# Patient Record
Sex: FEMALE | Race: BLACK OR AFRICAN AMERICAN | Employment: FULL TIME | URBAN - METROPOLITAN AREA
[De-identification: names, ages, dates, MRNs, and addresses within clinical notes are randomized per-mention and may not be internally consistent; named-entity substitution may affect disease eponyms.]

---

## 2017-02-08 ENCOUNTER — OFFICE VISIT (OUTPATIENT)
Dept: SURGERY | Age: 57
End: 2017-02-08

## 2017-02-08 VITALS
DIASTOLIC BLOOD PRESSURE: 89 MMHG | BODY MASS INDEX: 25.46 KG/M2 | HEIGHT: 68 IN | WEIGHT: 168 LBS | SYSTOLIC BLOOD PRESSURE: 138 MMHG | HEART RATE: 67 BPM

## 2017-02-08 DIAGNOSIS — Z90.13 S/P BILATERAL MASTECTOMY: ICD-10-CM

## 2017-02-08 DIAGNOSIS — C50.211 BREAST CANCER OF UPPER-INNER QUADRANT OF RIGHT FEMALE BREAST (HCC): Primary | ICD-10-CM

## 2017-02-08 NOTE — PROGRESS NOTES
HISTORY OF PRESENT ILLNESS  Romy Reed is a 49 y.o. female. HPI   ESTABLISHED patient here for follow up RIGHT breast cancer. Denies any breast problems at this time. patient here for follow-up visit s/p BILATERAL mastectomies and PAC removal for RIGHT breast cancer T2(originally 3.5 cm now 1.8 cm post alejandrina) N0 triple negative. Surgery was on 2/23/16. Tissue expanders/implants placed by Dr. Juanpablo Knight  History of breast cancer-  RIGHT breast IDC T2N0 triple negative  Completed neoadjuvant chemotherapy. Followed by Dr. Jay Jay Almodovar. 2/23/16- BILATERAL mastectomy with reconstruction, RIGHT SLNB and port removal  .   No radiation. 5/2016- BILATERAL implants placed by Dr. Juanpablo Knight. 12/2016- BILATERAL revision breast reconstruction.        Review of Systems   All other systems reviewed and are negative. Physical Exam   Pulmonary/Chest:       Bilateral breasts surgically absent. Implants intact. No masses or adenopathy   Nursing note and vitals reviewed. ASSESSMENT and PLAN    ICD-10-CM ICD-9-CM    1. Breast cancer of upper-inner quadrant of right female breast ([facility] [address] C50.211 174.2    2. S/P bilateral mastectomy Z90.13 V45.71    patient here for follow-up visit s/p BILATERAL mastectomies and PAC removal for RIGHT breast cancer T2(originally 3.5 cm now 1.8 cm post alejandrina) N0 triple negative. Surgery was on 2/23/16. Tissue expanders/implants placed by Dr. Juanpablo Knight  - no evidence local recurrence  - f/u in 6 months.

## 2017-02-08 NOTE — PATIENT INSTRUCTIONS
Breast Self-Exam: Care Instructions  Your Care Instructions  A breast self-exam is when you check your breasts for lumps or changes. This regular exam helps you learn how your breasts normally look and feel. Most breast problems or changes are not because of cancer. Breast self-exam is not a substitute for a mammogram. Having regular breast exams by your doctor and regular mammograms improve your chances of finding any problems with your breasts. Some women set a time each month to do a step-by-step breast self-exam. Other women like a less formal system. They might look at their breasts as they brush their teeth, or feel their breasts once in a while in the shower. If you notice a change in your breast, tell your doctor. Follow-up care is a key part of your treatment and safety. Be sure to make and go to all appointments, and call your doctor if you are having problems. Its also a good idea to know your test results and keep a list of the medicines you take. How do you do a breast self-exam?  · The best time to examine your breasts is usually one week after your menstrual period begins. Your breasts should not be tender then. If you do not have periods, you might do your exam on a day of the month that is easy to remember. · To examine your breasts:  ¨ Remove all your clothes above the waist and lie down. When you are lying down, your breast tissue spreads evenly over your chest wall, which makes it easier to feel all your breast tissue. ¨ Use the pads--not the fingertips--of the 3 middle fingers of your left hand to check your right breast. Move your fingers slowly in small coin-sized circles that overlap. ¨ Use three levels of pressure to feel of all your breast tissue. Use light pressure to feel the tissue close to the skin surface. Use medium pressure to feel a little deeper. Use firm pressure to feel your tissue close to your breastbone and ribs.  Use each pressure level to feel your breast tissue before moving on to the next spot. ¨ Check your entire breast, moving up and down as if following a strip from the collarbone to the bra line, and from the armpit to the ribs. Repeat until you have covered the entire breast.  ¨ Repeat this procedure for your left breast, using the pads of the 3 middle fingers of your right hand. · To examine your breasts while in the shower:  ¨ Place one arm over your head and lightly soap your breast on that side. ¨ Using the pads of your fingers, gently move your hand over your breast (in the strip pattern described above), feeling carefully for any lumps or changes. ¨ Repeat for the other breast.  · Have your doctor inspect anything you notice to see if you need further testing. Where can you learn more? Go to http://blanka-nilson.info/. Enter P148 in the search box to learn more about \"Breast Self-Exam: Care Instructions. \"  Current as of: July 26, 2016  Content Version: 11.1  © 5339-3046 [employer]. Care instructions adapted under license by [employer] (which disclaims liability or warranty for this information). If you have questions about a medical condition or this instruction, always ask your healthcare professional. [place] [address] any warranty or liability for your use of this information.

## 2017-05-24 ENCOUNTER — OFFICE VISIT (OUTPATIENT)
Dept: ONCOLOGY | Age: 57
End: 2017-05-24

## 2017-05-24 VITALS — BODY MASS INDEX: 25.46 KG/M2 | WEIGHT: 168 LBS | HEIGHT: 68 IN | RESPIRATION RATE: 18 BRPM

## 2017-05-24 DIAGNOSIS — C50.911 MALIGNANT NEOPLASM OF RIGHT FEMALE BREAST, UNSPECIFIED SITE OF BREAST: Primary | ICD-10-CM

## 2017-05-24 NOTE — PROGRESS NOTES
Jluis Rojas is a 56 y.o. female had concerns including Follow-up and Breast Cancer. HIPAA verified by two patient identifiers. C/o moderate hot flashes,minimal itching  To abdomen,mild numbness /tingling to fingers and toes. Ms. Martha Platt has a reminder for a \"due or due soon\" health maintenance. I have asked that she contact her primary care provider for follow-up on this health maintenance.

## 2017-05-24 NOTE — PROGRESS NOTES
Follow up Note        Patient: Dayton Umana MRN: [id number]  SSN: [id number]    YOB: 1960  Age: 56 y.o. Sex: female        Diagnosis:     1. Right breast carcinoma:  T2 N0 Mx (Stage IIA) infiltrating ductal carcinoma, Tumor size 3.5 cm, LN -ve, grade 3, ER -ve, TX -ve, Her 2 -ve      Treatment:     1. Neoadjuvant chemotherapy   S/p Taxol, Carboplatin - completed 9 weeks on     Therapy stopped due to significant cytopenias (11/25/16)   Adriamycin, Cytoxan -  Cycle 4 completed 1/27/16    2. Bilateral mastectomy with reconstruction by tissue expanders 2/23/16            Subjective:      Dayton Umana is a 56 y.o. female with right sided breast cancer. She felt a lump in her breast, underwent a mammogram, was noted to have a 3.5 cm mass in the medial upper part of the breast. A biopsy of the mass confirmed a nuclear grade III, ER -ve, TX -ve, Her 2 -ve. [place] LN biopsy shows no evidence of disease in the LNs. She completed neoadjuvant chemotherapy and then underwent bilateral mastectomies. She had reconstructive surgery by Dr. Samia Deleon 12/19/2017. Ms. Gomez Fitzpatrick returns today feeling well except for intermittent vertigo.  She said she has a history of this and saw an ENT in the past. She also has intermittent neuropathy in the fingers and hot flashes      Review of Systems:    Constitutional: hot flashes  Eyes: negative  Ears, Nose, Mouth, Throat, and Face: negative  Respiratory: negative  Cardiovascular: negative  Gastrointestinal: nausea  Hematologic/Lymphatic: negative  Skin: s/p mastectomies and reconstruction  Musculoskeletal:negative  Neurological: intermittent neuropathy of the fingers      Past Medical History:   Diagnosis Date    ARF (acute renal failure) ([facility] [address] 3/12/11    as of 8/11/15 pt states has resolved and is not currently being folllowed by specialist    Autoimmune disease ([facility] [address] 2016    SJOURN SYNDROM NEW DX & RA    Cancer ([facility] [address] 7/2015    triple (-) breast ca    Chest pain Per Pt on Rt side and arm pit only, resolves with Tylenol and states she believes it is related to her tumor    Colon polyp     Dizziness     pt states it is vertigo    Echocardiogram normal 2/19/16    Family hx of colon cancer     History of chemotherapy     AUG 2015- JAN 2016    Ill-defined condition     HAD CHEMO LAST JAN 2016    Ill-defined condition     rhematoid arthritis,sjogren syndrome    Nephrotic syndrome 3/12/2011    biopsy inconclusive but steroid responsive    TMJ (temporomandibular joint disorder)     Toothache     being treated for tooth ache x 1 week.  pain better with treatment     Past Surgical History:   Procedure Laterality Date    BREAST SURGERY PROCEDURE UNLISTED Right 8/13/15    RIGHT axillary SLNB and port a cath insertion    ENDOSCOPY, COLON, DIAGNOSTIC  12/12/11    Dr. Sharan Dickerson HX BREAST BIOPSY Right 7/2015    HX BREAST RECONSTRUCTION Bilateral 2/23/2016    BREAST RECONSTRUCTION performed by Mehran Pandya MD at [address] HX BREAST RECONSTRUCTION Bilateral 5/23/2016    REMOVAL TISSUE EXPANDERS BILATERAL BREAST / RECONSTRUCTION BILATERAL BREASTS / GEL IMPLANTS  performed by Mehran Pandya MD at [address] [place] [address] HX BREAST RECONSTRUCTION Bilateral 12/19/2016    REVISION BILATERAL BREAST RECONSTRUCTION WITH EXCISION OF EXCESS TISSUE / LATERAL BREAST BILATERAL NIPPLE RECONSTRUCTION WITH FTSG  performed by Mehran Pandya MD at [facility] HX GI      COLONOSCOPY    HX GI      ENDOSCOPY    HX GYN      uterine ablation    HX MASTECTOMY Bilateral 2/23/2016    BILATERAL BREAST MASTECTOMY, BILATERAL BREAST RECONSTRUCTION WITH TISSUE EXPANDERS AFTER MASTECTOMIES REMOVE PORT LEFT CHEST performed by Jonas Ray MD at [place] [address]    HX TONSILLECTOMY      HX TUBAL LIGATION      HX VASCULAR ACCESS      PORT-A-CATH    HX VASCULAR ACCESS      PORT-A-CATH REMOVAL      Family History   Problem Relation Age of Onset    Cancer Mother colon cancer    Hypertension Mother     Kidney Disease Mother      dialysis    Alcohol abuse Father     Stroke Sister     Cancer Brother      bone cancer    Anemia Daughter     Diabetes Brother     Heart Surgery Sister      CABG 3 WAY    Heart Attack Sister     No Known Problems Brother     Anesth Problems Son     Delayed Awakening Son      Social History   Substance Use Topics    Smoking status: Never Smoker    Smokeless tobacco: Never Used    Alcohol use No      Prior to Admission medications    Medication Sig Start Date End Date Taking? Authorizing Provider   omega-3 fatty acids-vitamin e (FISH OIL) 1,000 mg cap Take 1 Cap by mouth daily. Historical Provider   Ferrous Fumarate 325 mg (106 mg iron) tab Take  by mouth. Historical Provider   MULTIVITAMINS (MULTIVITAMIN PO) Take 1 Tab by mouth daily. 3/30/10   Historical Provider          Allergies   Allergen Reactions    Lisinopril Rash and Angioedema    Pcn [Penicillins] Other (comments)     Hallucinations, confusion    Septra [Sulfamethoprim Ds] Rash         Objective:     Vitals:    05/24/17 1458   Resp: 18   Weight: 168 lb (76.2 kg)   Height: 5' 8\" (1.727 m)          Physical Exam:    GENERAL: alert, cooperative  EYE: negative  LYMPHATIC: Cervical, supraclavicular, and axillary nodes normal.   THROAT & NECK: normal and no erythema or exudates noted. LUNG: clear to auscultation bilaterally  HEART: regular rate and rhythm  ABDOMEN: soft, non-tender  EXTREMITIES: no cyanosis or edema  SKIN: healing bilateral mastectomy incisions with tissue expanders  NEUROLOGIC: negative          Assessment:     1.  Right breast carcinoma:  T2 N0 Mx (Stage IIA) infiltrating ductal carcinoma, Tumor size 3.5 cm, LN -ve, grade 3, ER -ve, IN -ve, Her 2 -ve    ypT1c N0    ECOG PS 0  Intent of therapy - curative    LVEF 60-65% - repeated on 2/19/16 after completion of chemotherapy    Adriamycin / cytoxan - s/p 4 cycles  Taxol/Carboplatin, completed 9 weeks    Therapy stopped because of recurrent cytopenia     S/p bilateral mastectomy - (2/23/16) 1.8 cm of residual tumor   Asymptomatic  In remission  Met with Survivorship NP        Plan:       1. Follow up in 6 months        Signed by: Luz Collado MD                     June 13, 2017        CC.  MD Kathy Lovelace MD

## 2017-05-26 ENCOUNTER — TELEPHONE (OUTPATIENT)
Dept: INTERNAL MEDICINE CLINIC | Age: 57
End: 2017-05-26

## 2017-06-05 ENCOUNTER — OFFICE VISIT (OUTPATIENT)
Dept: INTERNAL MEDICINE CLINIC | Age: 57
End: 2017-06-05

## 2017-06-05 VITALS
WEIGHT: 171 LBS | OXYGEN SATURATION: 98 % | HEART RATE: 62 BPM | BODY MASS INDEX: 25.91 KG/M2 | TEMPERATURE: 98.1 F | HEIGHT: 68 IN | DIASTOLIC BLOOD PRESSURE: 85 MMHG | SYSTOLIC BLOOD PRESSURE: 136 MMHG | RESPIRATION RATE: 18 BRPM

## 2017-06-05 DIAGNOSIS — Z23 ENCOUNTER FOR IMMUNIZATION: ICD-10-CM

## 2017-06-05 DIAGNOSIS — H81.90 VESTIBULAR DIZZINESS, UNSPECIFIED LATERALITY: ICD-10-CM

## 2017-06-05 DIAGNOSIS — Z13.220 SCREENING FOR HYPERLIPIDEMIA: ICD-10-CM

## 2017-06-05 DIAGNOSIS — R53.83 FATIGUE, UNSPECIFIED TYPE: Primary | ICD-10-CM

## 2017-06-05 RX ORDER — MECLIZINE HYDROCHLORIDE 25 MG/1
TABLET ORAL
Qty: 30 TAB | Refills: 0 | Status: SHIPPED | OUTPATIENT
Start: 2017-06-05 | End: 2017-08-07

## 2017-06-05 NOTE — PROGRESS NOTES
Maye Rosenthal is a 56 y.o. female who presents with concerns of a rash on right forearm for the past 5 days. It is better today. No new medications or supplements. No new topicals. No fever. Reports dizziness that is constant. She has a history of vertigo. At times, feels like may pass out. She reports malaise, fatigued and nauseated. Eating more frequent small meals. Increased protein in diet. Followed by Dr. Karsten Ace and Dr. Chelo Griffith for prior BRCA. No sign of recurrence. Saw rheumatologist, Dr. Alesha Dumont. Was told that vitamin D was high, stopped all supplements and seen for followup in march, vitamin D and calcium were normal.        Past Medical History:   Diagnosis Date    ARF (acute renal failure) ([facility] [address] 3/12/11    as of 8/11/15 pt states has resolved and is not currently being folllowed by specialist    Autoimmune disease ([facility] [address] 2016    SJOURN SYNDROM NEW DX & RA    Cancer ([facility] [address] 7/2015    triple (-) breast ca    Chest pain     Per Pt on Rt side and arm pit only, resolves with Tylenol and states she believes it is related to her tumor    Colon polyp     Dizziness     pt states it is vertigo    Echocardiogram normal 2/19/16    Family hx of colon cancer     History of chemotherapy     AUG 2015- JAN 2016    Ill-defined condition     HAD CHEMO LAST JAN 2016    Ill-defined condition     rhematoid arthritis,sjogren syndrome    Nephrotic syndrome 3/12/2011    biopsy inconclusive but steroid responsive    TMJ (temporomandibular joint disorder)     Toothache     being treated for tooth ache x 1 week.  pain better with treatment       Family History   Problem Relation Age of Onset    Cancer Mother      colon cancer    Hypertension Mother     Kidney Disease Mother      dialysis    Alcohol abuse Father     Stroke Sister     Cancer Brother      bone cancer    Anemia Daughter     Diabetes Brother     Heart Surgery Sister      CABG 3 WAY    Heart Attack Sister     No Known Problems Brother     Anesth Problems Son     Delayed Awakening Son        Social History     Social History    Marital status:      Spouse name: N/A    Number of children: N/A    Years of education: N/A     Occupational History    Not on file. Social History Main Topics    Smoking status: Never Smoker    Smokeless tobacco: Never Used    Alcohol use No    Drug use: No    Sexual activity: Yes     Partners: Male     Birth control/ protection: None     Other Topics Concern    Not on file     Social History Narrative       No current outpatient prescriptions on file prior to visit. No current facility-administered medications on file prior to visit. Review of Systems  Pertinent items are noted in HPI. Objective:     Visit Vitals    /85 (BP 1 Location: Left arm, BP Patient Position: Sitting)    Pulse 62    Temp 98.1 °F (36.7 °C) (Oral)    Resp 18    Ht 5' 8\" (1.727 m)    Wt 171 lb (77.6 kg)    LMP 04/01/2015    SpO2 98%    BMI 26 kg/m2     Gen: well appearing female  Resp:  No wheezing, no rhonchi, no rales. CV:  RRR, normal S1S2  Skin: punctate lesion on right forearm. Neuro- alert, normal gait, 5/5 motor in lower extremities, normal sensory, normal coordination. Assessment/Plan:     1. Fatigue, unspecified type- given information on fatigue. Checking baseline labs. Recommend regular exercise and adequate rest    - THYROID PANEL W/TSH; Future  - CBC W/O DIFF; Future    2. Screening for hyperlipidemia    - LIPID PANEL; Future    3. Vestibular dizziness, unspecified laterality- given vertigo exercises. Antihistamines prn. Follow-up Disposition:  Return for followup pending labs.   Pilar Michel MD

## 2017-06-05 NOTE — MR AVS SNAPSHOT
Visit Information Date & Time Provider Department Dept. Phone Encounter #  
 6/5/2017 11:45 AM Viviana Dasilva, [address]  [id number] Follow-up Instructions Return for followup pending labs. .  
  
Your Appointments 8/7/2017  9:00 AM  
Follow Up with BINTA Salas [address] ([address]) Appt Note: 6 month follow up / cp $40 ct 2-8-17  
 [address] [facility] [address]  
  
    
 11/27/2017  8:30 AM  
Any with Henry Cleveland MD  
[address] [facility]) Appt Note: onc 6 mth f/u  
 [address] [address]  
[address] [facility] [address] Upcoming Health Maintenance Date Due Pneumococcal 19-64 Highest Risk (1 of 3 - PCV13) 3/3/1981 DTaP/Tdap/Td series (1 - Tdap) 3/3/1983 PAP AKA CERVICAL CYTOLOGY 9/1/2014 BREAST CANCER SCRN MAMMOGRAM 7/17/2017 INFLUENZA AGE 9 TO ADULT 8/1/2017 COLONOSCOPY 11/7/2021 Allergies as of 6/5/2017  Review Complete On: 6/5/2017 By: Zbigniew Chu LPN Severity Noted Reaction Type Reactions Lisinopril  06/11/2012   Side Effect Rash, Angioedema Pcn [Penicillins]  03/30/2010    Other (comments) Hallucinations, confusion Septra [Sulfamethoprim Ds]  06/29/2014    Rash Current Immunizations  Reviewed on 5/24/2017 Name Date Influenza Vaccine (Quad) PF 11/3/2016 Influenza Vaccine PF 11/21/2014 Not reviewed this visit You Were Diagnosed With   
  
 Codes Comments Fatigue, unspecified type    -  Primary ICD-10-CM: R53.83 ICD-9-CM: 780.79 Screening for hyperlipidemia     ICD-10-CM: Z13.220 ICD-9-CM: V77.91   
 Vestibular dizziness, unspecified laterality     ICD-10-CM: H81.90 ICD-9-CM: 386. 9 Vitals BP Pulse Temp Resp Height(growth percentile) Weight(growth percentile) 136/85 (BP 1 Location: Left arm, BP Patient Position: Sitting) 62 98.1 °F (36.7 °C) (Oral) 18 5' 8\" (1.727 m) 171 lb (77.6 kg) LMP SpO2 BMI OB Status Smoking Status 04/01/2015 98% 26 kg/m2 Postmenopausal Never Smoker Vitals History BMI and BSA Data Body Mass Index Body Surface Area  
 26 kg/m 2 1.93 m 2 Preferred Pharmacy [facility] Phone [facility] [address] [place] [address] [phone] Your Updated Medication List  
  
   
This list is accurate as of: 6/5/17 12:40 PM.  Always use your most recent med list.  
  
  
  
  
 meclizine 25 mg tablet Commonly known as:  ANTIVERT Take 1/2-1 tablet three times a day as needed for vertigo. Prescriptions Sent to Pharmacy Refills  
 meclizine (ANTIVERT) 25 mg tablet 0 Sig: Take 1/2-1 tablet three times a day as needed for vertigo. Class: Normal  
 Pharmacy: [id number] [address] [place] [address] #: [phone] Follow-up Instructions Return for followup pending labs. Dajuan Pozo To-Do List   
 06/05/2017 Lab:  CBC W/O DIFF   
  
 06/05/2017 Lab:  LIPID PANEL   
  
 06/05/2017 Lab:  THYROID PANEL W/TSH Patient Instructions [place] Exercises for Vertigo: Care Instructions Your Care Instructions Simple exercises can help you regain your balance when you have vertigo. If you have Ménière's disease, benign paroxysmal positional vertigo (BPPV), or another inner ear problem, you may have vertigo off and on. Do these exercises first thing in the morning and before you go to bed. You might get dizzy when you first start them.  If this happens, try to do them for at least 5 minutes. Do a group of exercises at a time, starting at the top of the list. It may take several weeks before you can do all the exercises without feeling dizzy. Follow-up care is a key part of your treatment and safety. Be sure to make and go to all appointments, and call your doctor if you are having problems. It's also a good idea to know your test results and keep a list of the medicines you take. How can you care for yourself at home? Exercise 1 While sitting on the side of the bed and holding your head still: 
· Look up as far as you can. · Look down as far as you can. · Look from side to side as far as you can. · Stretch your arm straight out in front of you. Focus on your index finger. Continue to focus on your finger while you bring it to your nose. Exercise 2 While sitting on the side of the bed: · Bring your head as far back as you can. · Bring your head forward to touch your chin to your chest. 
· Turn your head from side to side. · Do these exercises first with your eyes open. Then try with your eyes closed. Exercise 3 While sitting on the side of the bed: · Shrug your shoulders straight upward, then relax them. · Bend over and try to touch the ground with your fingers. Then go back to a sitting position. · Toss a small ball from one hand to the other. Throw the ball higher than your eyes so you have to look up. Exercise 4 While standing (with someone close by if you feel uncomfortable): 
· Repeat Exercise 1. 
· Repeat Exercise 2. 
· Pass a ball between your legs and above your head. · Sit down and then stand up. Repeat. Turn around in a [place] a different way each time you stand. · With someone close by to help you, try the above exercises with your eyes closed. Exercise 5 In a room that is cleared of obstacles: 
· Walk to a corner of the room, turn to your right, and walk back to the starting point. Now, repeat and turn left. · Walk up and down a slope. Now try stairs. · While holding on to someone's arm, try these exercises with your eyes closed. When should you call for help? Watch closely for changes in your health, and be sure to contact your doctor if: 
· Your vertigo gets worse. · You want more information about vertigo. · You want more information about exercises for vertigo. Where can you learn more? Go to http://blanka-nilson.info/. Enter K625 in the search box to learn more about \"[place] Exercises for Vertigo: Care Instructions. \" Current as of: July 29, 2016 Content Version: 11.2 © 8678-1634 [employer]. Care instructions adapted under license by [employer] (which disclaims liability or warranty for this information). If you have questions about a medical condition or this instruction, always ask your healthcare professional. [address] any warranty or liability for your use of this information. Introducing [facility]! Dear Chris Mensah: Thank you for requesting a MPSTOR account. Our records indicate that you already have an active MPSTOR account. You can access your account anytime at [email or web address] Did you know that you can access your hospital and ER discharge instructions at any time in MPSTOR? You can also review all of your test results from your hospital stay or ER visit. Additional Information If you have questions, please visit the Frequently Asked Questions section of the MPSTOR website at [email or web address] Remember, MPSTOR is NOT to be used for urgent needs. For medical emergencies, dial 911. Now available from your iPhone and Android! Please provide this summary of care documentation to your next provider. Your primary care clinician is listed as Reyes Bracken. If you have any questions after today's visit, please call [phone].

## 2017-06-05 NOTE — PATIENT INSTRUCTIONS
[place]orne Exercises for Vertigo: Care Instructions  Your Care Instructions  Simple exercises can help you regain your balance when you have vertigo. If you have Ménière's disease, benign paroxysmal positional vertigo (BPPV), or another inner ear problem, you may have vertigo off and on. Do these exercises first thing in the morning and before you go to bed. You might get dizzy when you first start them. If this happens, try to do them for at least 5 minutes. Do a group of exercises at a time, starting at the top of the list. It may take several weeks before you can do all the exercises without feeling dizzy. Follow-up care is a key part of your treatment and safety. Be sure to make and go to all appointments, and call your doctor if you are having problems. It's also a good idea to know your test results and keep a list of the medicines you take. How can you care for yourself at home? Exercise 1  While sitting on the side of the bed and holding your head still:  · Look up as far as you can. · Look down as far as you can. · Look from side to side as far as you can. · Stretch your arm straight out in front of you. Focus on your index finger. Continue to focus on your finger while you bring it to your nose. Exercise 2  While sitting on the side of the bed:  · Bring your head as far back as you can. · Bring your head forward to touch your chin to your chest.  · Turn your head from side to side. · Do these exercises first with your eyes open. Then try with your eyes closed. Exercise 3  While sitting on the side of the bed:  · Shrug your shoulders straight upward, then relax them. · Bend over and try to touch the ground with your fingers. Then go back to a sitting position. · Toss a small ball from one hand to the other. Throw the ball higher than your eyes so you have to look up.   Exercise 4  While standing (with someone close by if you feel uncomfortable):  · Repeat Exercise 1.  · Repeat Exercise 2.  · Pass a ball between your legs and above your head. · Sit down and then stand up. Repeat. Turn around in a [place] a different way each time you stand. · With someone close by to help you, try the above exercises with your eyes closed. Exercise 5  In a room that is cleared of obstacles:  · Walk to a corner of the room, turn to your right, and walk back to the starting point. Now, repeat and turn left. · Walk up and down a slope. Now try stairs. · While holding on to someone's arm, try these exercises with your eyes closed. When should you call for help? Watch closely for changes in your health, and be sure to contact your doctor if:  · Your vertigo gets worse. · You want more information about vertigo. · You want more information about exercises for vertigo. Where can you learn more? Go to http://blanka-nilson.info/. Enter B227 in the search box to learn more about \"Cawthorne Exercises for Vertigo: Care Instructions. \"  Current as of: July 29, 2016  Content Version: 11.2  © 6502-9726 Healthwise, Incorporated. Care instructions adapted under license by [employer] (which disclaims liability or warranty for this information). If you have questions about a medical condition or this instruction, always ask your healthcare professional. [place] [address] any warranty or liability for your use of this information.

## 2017-06-08 ENCOUNTER — LAB ONLY (OUTPATIENT)
Dept: INTERNAL MEDICINE CLINIC | Age: 57
End: 2017-06-08

## 2017-06-08 DIAGNOSIS — Z13.220 SCREENING FOR HYPERLIPIDEMIA: ICD-10-CM

## 2017-06-08 DIAGNOSIS — R53.83 FATIGUE, UNSPECIFIED TYPE: ICD-10-CM

## 2017-06-08 NOTE — LETTER
6/23/2017 3:33 PM 
 
Ms. Fahad Adkins [address] [facility] [address] [address] Dear Fahad Adkins: 
 
Please find your most recent results below. Resulted Orders THYROID PANEL W/TSH Result Value Ref Range TSH 1.680 0.450 - 4.500 uIU/mL T4, Total 7.8 4.5 - 12.0 ug/dL  
 T3 Uptake 27 24 - 39 % Free Thyroxine Index 2.1 1.2 - 4.9 Narrative Performed at:  [address]  [id number] : Paul Parker MD, Phone:  [phone] CBC W/O DIFF Result Value Ref Range WBC 3.8 3.4 - 10.8 x10E3/uL  
 RBC 4.16 3.77 - 5.28 x10E6/uL HGB 12.9 11.1 - 15.9 g/dL HCT 38.5 34.0 - 46.6 % MCV 93 79 - 97 fL  
 MCH 31.0 26.6 - 33.0 pg  
 MCHC 33.5 31.5 - 35.7 g/dL  
 RDW 14.7 12.3 - 15.4 % PLATELET 090 667 - 607 x10E3/uL Narrative Performed at:  [address]  [id number] : Paul Parker MD, Phone:  [phone] LIPID PANEL Result Value Ref Range Cholesterol, total 193 100 - 199 mg/dL Triglyceride 87 0 - 149 mg/dL HDL Cholesterol 58 >39 mg/dL VLDL, calculated 17 5 - 40 mg/dL LDL, calculated 118 (H) 0 - 99 mg/dL Narrative Performed at:  [address]  [id number] : Paul Parker MD, Phone:  [phone] RECOMMENDATIONS: 
Labs are normal including thyroid and blood counts.  Mildly elevated LDL cholesterol. Nothing in lab to explain fatigue. Please call me if you have any questions: [phone] Sincerely, [facility]

## 2017-06-09 LAB
CHOLEST SERPL-MCNC: 193 MG/DL (ref 100–199)
ERYTHROCYTE [DISTWIDTH] IN BLOOD BY AUTOMATED COUNT: 14.7 % (ref 12.3–15.4)
FT4I SERPL CALC-MCNC: 2.1 (ref 1.2–4.9)
HCT VFR BLD AUTO: 38.5 % (ref 34–46.6)
HDLC SERPL-MCNC: 58 MG/DL
HGB BLD-MCNC: 12.9 G/DL (ref 11.1–15.9)
LDLC SERPL CALC-MCNC: 118 MG/DL (ref 0–99)
MCH RBC QN AUTO: 31 PG (ref 26.6–33)
MCHC RBC AUTO-ENTMCNC: 33.5 G/DL (ref 31.5–35.7)
MCV RBC AUTO: 93 FL (ref 79–97)
PLATELET # BLD AUTO: 211 X10E3/UL (ref 150–379)
RBC # BLD AUTO: 4.16 X10E6/UL (ref 3.77–5.28)
T3RU NFR SERPL: 27 % (ref 24–39)
T4 SERPL-MCNC: 7.8 UG/DL (ref 4.5–12)
TRIGL SERPL-MCNC: 87 MG/DL (ref 0–149)
TSH SERPL DL<=0.005 MIU/L-ACNC: 1.68 UIU/ML (ref 0.45–4.5)
VLDLC SERPL CALC-MCNC: 17 MG/DL (ref 5–40)
WBC # BLD AUTO: 3.8 X10E3/UL (ref 3.4–10.8)

## 2017-06-21 NOTE — PROGRESS NOTES
Notify patient labs normal including thyroid and blood counts. Mildly elevated LDL cholesterol. Nothing in lab to explain fatigue.

## 2017-06-30 ENCOUNTER — TELEPHONE (OUTPATIENT)
Dept: INTERNAL MEDICINE CLINIC | Age: 57
End: 2017-06-30

## 2017-08-07 ENCOUNTER — OFFICE VISIT (OUTPATIENT)
Dept: SURGERY | Age: 57
End: 2017-08-07

## 2017-08-07 VITALS
DIASTOLIC BLOOD PRESSURE: 91 MMHG | BODY MASS INDEX: 25.91 KG/M2 | SYSTOLIC BLOOD PRESSURE: 135 MMHG | HEART RATE: 67 BPM | HEIGHT: 68 IN | WEIGHT: 171 LBS

## 2017-08-07 DIAGNOSIS — C50.911 MALIGNANT NEOPLASM OF RIGHT FEMALE BREAST, UNSPECIFIED SITE OF BREAST: Primary | ICD-10-CM

## 2017-08-07 DIAGNOSIS — Z90.13 S/P BILATERAL MASTECTOMY: ICD-10-CM

## 2017-08-07 NOTE — PROGRESS NOTES
HISTORY OF PRESENT ILLNESS  Adrienne Jackson is a 56 y.o. female. HPI  ESTABLISHED patient here today for follow up RIGHT breast cancer. Has occasional bilateral breast discomfort. Denies any other breast problems at this time. History of breast cancer-  RIGHT breast IDC T2N0 triple negative  Completed neoadjuvant chemotherapy. Followed by Dr. Leonor Parr (originally 3.5 cm, 1.8 cm post neoadjuvant chemo)  2/23/16- BILATERAL mastectomy with reconstruction, RIGHT SLNB and port removal  .   No radiation. 5/2016- BILATERAL implants placed by Dr. Bradley Arzola. 12/2016- BILATERAL revision breast reconstruction. OB History     Obstetric Comments    Menarche:  8. LMP: 4/8/15. # of Children:  3. Age at Delivery of First Child:  14.   Hysterectomy/oophorectomy:  NO/NO. Breast Bx:  yes. Hx of Breast Feeding:  no. BCP:  yes.  Hormone therapy:  no.               Past Surgical History:   Procedure Laterality Date    BREAST SURGERY PROCEDURE UNLISTED Right 8/13/15    RIGHT axillary SLNB and port a cath insertion    ENDOSCOPY, COLON, DIAGNOSTIC  12/12/11    Dr. Yusef Ordonez HX BREAST BIOPSY Right 7/2015    HX BREAST RECONSTRUCTION Bilateral 2/23/2016    BREAST RECONSTRUCTION performed by Fany Stephen MD at [address] [place] [address] Bilateral 5/23/2016    REMOVAL TISSUE EXPANDERS BILATERAL BREAST / RECONSTRUCTION BILATERAL BREASTS / GEL IMPLANTS  performed by Fany Stephen MD at [address] HX BREAST RECONSTRUCTION Bilateral 12/19/2016    REVISION BILATERAL BREAST RECONSTRUCTION WITH EXCISION OF EXCESS TISSUE / LATERAL BREAST BILATERAL NIPPLE RECONSTRUCTION WITH FTSG  performed by Fany Stephen MD at [address] HX GI      COLONOSCOPY    HX GI      ENDOSCOPY    HX GYN      uterine ablation    HX MASTECTOMY Bilateral 2/23/2016    BILATERAL BREAST MASTECTOMY, BILATERAL BREAST RECONSTRUCTION WITH TISSUE EXPANDERS AFTER MASTECTOMIES REMOVE PORT LEFT CHEST performed by Mai Ann MD at [facility]    HX TONSILLECTOMY      HX TUBAL LIGATION      HX VASCULAR ACCESS      PORT-A-CATH    HX VASCULAR ACCESS      PORT-A-CATH REMOVAL     No FH of breast or ovarian cancer. ROS    Physical Exam   Constitutional: She appears well-developed and well-nourished. Pulmonary/Chest: Right breast exhibits no mass, no skin change and no tenderness. Left breast exhibits no mass, no skin change and no tenderness. Musculoskeletal: Normal range of motion. UE x 2   Lymphadenopathy:     She has no cervical adenopathy. She has no axillary adenopathy. Right: No supraclavicular adenopathy present. Left: No supraclavicular adenopathy present. Skin: Skin is warm, dry and intact. Chest and breasts examined   Psychiatric: She has a normal mood and affect. Her speech is normal and behavior is normal.     Visit Vitals    BP (!) 135/91    Pulse 67    Ht 5' 8\" (1.727 m)    Wt 171 lb (77.6 kg)    LMP 04/01/2015    BMI 26 kg/m2     ASSESSMENT and PLAN  Encounter Diagnoses   Name Primary?  Malignant neoplasm of right female breast, unspecified site of breast ([facility] [address] Yes    S/P bilateral mastectomy      Normal exam with no evidence of local recurrence. We discussed s/sx of local and distant recurrence. Patient has follow-up with Dr. Isiah Delarosa in 11/2017 and will rTC here in 6 months. She is comfortable with this plan. All questions answered and she stated understanding.

## 2017-08-07 NOTE — PATIENT INSTRUCTIONS
Breast Self-Exam: Care Instructions  Your Care Instructions  A breast self-exam is when you check your breasts for lumps or changes. This regular exam helps you learn how your breasts normally look and feel. Most breast problems or changes are not because of cancer. Breast self-exam is not a substitute for a mammogram. Having regular breast exams by your doctor and regular mammograms improve your chances of finding any problems with your breasts. Some women set a time each month to do a step-by-step breast self-exam. Other women like a less formal system. They might look at their breasts as they brush their teeth, or feel their breasts once in a while in the shower. If you notice a change in your breast, tell your doctor. Follow-up care is a key part of your treatment and safety. Be sure to make and go to all appointments, and call your doctor if you are having problems. Its also a good idea to know your test results and keep a list of the medicines you take. How do you do a breast self-exam?  · The best time to examine your breasts is usually one week after your menstrual period begins. Your breasts should not be tender then. If you do not have periods, you might do your exam on a day of the month that is easy to remember. · To examine your breasts:  ¨ Remove all your clothes above the waist and lie down. When you are lying down, your breast tissue spreads evenly over your chest wall, which makes it easier to feel all your breast tissue. ¨ Use the pads--not the fingertips--of the 3 middle fingers of your left hand to check your right breast. Move your fingers slowly in small coin-sized circles that overlap. ¨ Use three levels of pressure to feel of all your breast tissue. Use light pressure to feel the tissue close to the skin surface. Use medium pressure to feel a little deeper. Use firm pressure to feel your tissue close to your breastbone and ribs.  Use each pressure level to feel your breast tissue before moving on to the next spot. ¨ Check your entire breast, moving up and down as if following a strip from the collarbone to the bra line, and from the armpit to the ribs. Repeat until you have covered the entire breast.  ¨ Repeat this procedure for your left breast, using the pads of the 3 middle fingers of your right hand. · To examine your breasts while in the shower:  ¨ Place one arm over your head and lightly soap your breast on that side. ¨ Using the pads of your fingers, gently move your hand over your breast (in the strip pattern described above), feeling carefully for any lumps or changes. ¨ Repeat for the other breast.  · Have your doctor inspect anything you notice to see if you need further testing. Where can you learn more? Go to http://blanka-nilson.info/. Enter P148 in the search box to learn more about \"Breast Self-Exam: Care Instructions. \"  Current as of: July 26, 2016  Content Version: 11.3  © 4001-5318 [employer] Incorporated. Care instructions adapted under license by [employer] (which disclaims liability or warranty for this information). If you have questions about a medical condition or this instruction, always ask your healthcare professional. [place] [address] any warranty or liability for your use of this information.

## 2017-08-07 NOTE — PROGRESS NOTES
HISTORY OF PRESENT ILLNESS  Chitra Underwood is a 56 y.o. female.   HPI       ROS    Physical Exam    ASSESSMENT and PLAN  {ASSESSMENT/PLAN:84276}

## 2017-11-27 ENCOUNTER — OFFICE VISIT (OUTPATIENT)
Dept: ONCOLOGY | Age: 57
End: 2017-11-27

## 2017-11-27 VITALS
OXYGEN SATURATION: 99 % | BODY MASS INDEX: 25.4 KG/M2 | DIASTOLIC BLOOD PRESSURE: 88 MMHG | TEMPERATURE: 97.6 F | WEIGHT: 167.6 LBS | SYSTOLIC BLOOD PRESSURE: 139 MMHG | HEIGHT: 68 IN | RESPIRATION RATE: 16 BRPM | HEART RATE: 54 BPM

## 2017-11-27 DIAGNOSIS — C50.211 MALIGNANT NEOPLASM OF UPPER-INNER QUADRANT OF RIGHT BREAST IN FEMALE, ESTROGEN RECEPTOR NEGATIVE (HCC): Primary | ICD-10-CM

## 2017-11-27 DIAGNOSIS — Z17.1 MALIGNANT NEOPLASM OF UPPER-INNER QUADRANT OF RIGHT BREAST IN FEMALE, ESTROGEN RECEPTOR NEGATIVE (HCC): Primary | ICD-10-CM

## 2017-11-27 RX ORDER — VALACYCLOVIR HYDROCHLORIDE 500 MG/1
TABLET, FILM COATED ORAL
COMMUNITY
Start: 2017-09-18 | End: 2017-12-29

## 2017-11-27 RX ORDER — PENCICLOVIR 10 MG/G
CREAM TOPICAL
COMMUNITY
Start: 2017-10-30 | End: 2021-10-29

## 2017-11-27 RX ORDER — METRONIDAZOLE 500 MG/1
TABLET ORAL
COMMUNITY
Start: 2017-09-08 | End: 2017-11-27 | Stop reason: ALTCHOICE

## 2017-11-27 RX ORDER — TERCONAZOLE 4 MG/G
CREAM VAGINAL
COMMUNITY
Start: 2017-09-08 | End: 2017-12-29

## 2017-11-27 NOTE — PROGRESS NOTES
Tylenol and states she believes it is related to her tumor    Colon polyp     Dizziness     pt states it is vertigo    Echocardiogram normal 2/19/16    Family hx of colon cancer     History of chemotherapy     AUG 2015- JAN 2016    Ill-defined condition     HAD CHEMO LAST JAN 2016    Ill-defined condition     rhematoid arthritis,sjogren syndrome    Nephrotic syndrome 3/12/2011    biopsy inconclusive but steroid responsive    TMJ (temporomandibular joint disorder)     Toothache     being treated for tooth ache x 1 week.  pain better with treatment     Past Surgical History:   Procedure Laterality Date    BREAST SURGERY PROCEDURE UNLISTED Right 8/13/15    RIGHT axillary SLNB and port a cath insertion    ENDOSCOPY, COLON, DIAGNOSTIC  12/12/11    Dr. Ryan Simmons    HX BREAST BIOPSY Right 7/2015    HX BREAST RECONSTRUCTION Bilateral 2/23/2016    BREAST RECONSTRUCTION performed by Fredis Zaman MD at [address] HX BREAST RECONSTRUCTION Bilateral 5/23/2016    REMOVAL TISSUE EXPANDERS BILATERAL BREAST / RECONSTRUCTION BILATERAL BREASTS / GEL IMPLANTS  performed by Fredis Zaman MD at [facility] [address] HX BREAST RECONSTRUCTION Bilateral 12/19/2016    REVISION BILATERAL BREAST RECONSTRUCTION WITH EXCISION OF EXCESS TISSUE / LATERAL BREAST BILATERAL NIPPLE RECONSTRUCTION WITH FTSG  performed by Fredis Zaman MD at [address] HX GI      COLONOSCOPY    HX GI      ENDOSCOPY    HX GYN      uterine ablation    HX MASTECTOMY Bilateral 2/23/2016    BILATERAL BREAST MASTECTOMY, BILATERAL BREAST RECONSTRUCTION WITH TISSUE EXPANDERS AFTER MASTECTOMIES REMOVE PORT LEFT CHEST performed by Mag Deshpande MD at [place] [address]    HX TONSILLECTOMY      HX TUBAL LIGATION      HX VASCULAR ACCESS      PORT-A-CATH    HX VASCULAR ACCESS      PORT-A-CATH REMOVAL      Family History   Problem Relation Age of Onset    Cancer Mother      colon cancer    Hypertension Mother     Kidney Disease Mother      dialysis    Alcohol abuse Father     Stroke Sister     Cancer Brother      bone cancer    Anemia Daughter     Diabetes Brother     Heart Surgery Sister      CABG [address] WAY    Heart Attack Sister     No Known Problems Brother     Anesth Problems Son     Delayed Awakening Son      Social History   Substance Use Topics    Smoking status: Never Smoker    Smokeless tobacco: Never Used    Alcohol use No      Prior to Admission medications    Medication Sig Start Date End Date Taking? Authorizing Provider   DENAVIR 1 % topical cream  10/30/17   Historical Provider   valACYclovir (VALTREX) 500 mg tablet  9/18/17   Historical Provider   terconazole (TERAZOL 7) 0.4 % vaginal cream  9/8/17   Historical Provider          Allergies   Allergen Reactions    Lisinopril Rash and Angioedema    Pcn [Penicillins] Other (comments)     Hallucinations, confusion    Septra [Sulfamethoprim Ds] Rash         Objective:     Vitals:    11/27/17 0857   BP: 139/88   Pulse: (!) 54   Resp: 16   Temp: 97.6 °F (36.4 °C)   TempSrc: Oral   SpO2: 99%   Weight: 167 lb 9.6 oz (76 kg)   Height: 5' 8\" (1.727 m)          Physical Exam:    GENERAL: alert, cooperative  EYE: negative  LYMPHATIC: Cervical, supraclavicular, and axillary nodes normal.   THROAT & NECK: normal and no erythema or exudates noted. LUNG: clear to auscultation bilaterally  HEART: regular rate and rhythm  ABDOMEN: soft, non-tender  EXTREMITIES: no cyanosis or edema  SKIN: negative  NEUROLOGIC: negative        Assessment:     1.  Right breast carcinoma:  T2 N0 Mx (Stage IIA) infiltrating ductal carcinoma, Tumor size 3.5 cm, LN -ve, grade 3, ER -ve, TN -ve, Her 2 -ve    ypT1c N0    ECOG PS 0  Intent of therapy - curative    LVEF 60-65% - repeated on 2/19/16 after completion of chemotherapy    Adriamycin / cytoxan - s/p 4 cycles  Taxol/Carboplatin, completed 9 weeks    Therapy stopped because of recurrent cytopenia     S/p bilateral mastectomy - (2/23/16) 1.8 cm of residual tumor   Asymptomatic  In remission  Survivorship visit complete        Plan:       > Follow up in 6 months      Signed by: Marie Mann MD                     November 27, 2017        CC.  MD Casey Mccullough MD

## 2017-11-27 NOTE — PROGRESS NOTES
Dong Lee is a 56 y.o. female here today for right breast cancer; triple negative f/u. S/P AK + 9 cycles of Carbo/Taxol. Bilat mastectomy. Low pulse noted. Patient denies pain at this time. Patient states sometimes she has arthritic pain; pt states she takes Tylenol or Aleve to resolve pain.

## 2017-12-27 ENCOUNTER — TELEPHONE (OUTPATIENT)
Dept: INTERNAL MEDICINE CLINIC | Age: 57
End: 2017-12-27

## 2017-12-27 NOTE — TELEPHONE ENCOUNTER
Pt is calling for a sooner appt for pain in her neck on left side. The best contact is [phone].        Message received & copied from [facility]

## 2017-12-27 NOTE — TELEPHONE ENCOUNTER
Called patient. Two patient identifiers verified. Complaint of left sided neck pain. Taking tylenol and aleve. Some relief but pain returns once medication wears off. Recommended applying warm compresses and ice, stretching, and continuing NSAIDs. Appointment scheduled on 12/29/17 at 1330 with Dr. Devan Adams.

## 2017-12-29 ENCOUNTER — OFFICE VISIT (OUTPATIENT)
Dept: INTERNAL MEDICINE CLINIC | Age: 57
End: 2017-12-29

## 2017-12-29 VITALS
HEART RATE: 65 BPM | WEIGHT: 166.8 LBS | BODY MASS INDEX: 25.28 KG/M2 | HEIGHT: 68 IN | OXYGEN SATURATION: 100 % | TEMPERATURE: 98.1 F | DIASTOLIC BLOOD PRESSURE: 78 MMHG | RESPIRATION RATE: 18 BRPM | SYSTOLIC BLOOD PRESSURE: 127 MMHG

## 2017-12-29 DIAGNOSIS — M54.2 NECK PAIN: Primary | ICD-10-CM

## 2017-12-29 DIAGNOSIS — M54.2 CERVICALGIA: Primary | ICD-10-CM

## 2017-12-29 DIAGNOSIS — Z23 ENCOUNTER FOR IMMUNIZATION: ICD-10-CM

## 2017-12-29 NOTE — MR AVS SNAPSHOT
Visit Information Date & Time Provider Department Dept. Phone Encounter #  
 12/29/2017  1:30 PM Teddy Marinelli, [address] [place] [address],[address] [phone] [id number] Follow-up Instructions Return if symptoms worsen or fail to improve. Your Appointments 1/26/2018  1:30 PM  
New Patient with Deborah Franks MD  
[facility] [address] [facility]) Appt Note: np; chestpains, sob, mld forms $40 cp cc -lj [address] [facility]  
[phone] [address] [facility]  
  
    
 2/8/2018  9:00 AM  
Follow Up with BINTA Castro [address] ([address]) Appt Note: 6 month follow up / cp $40 ct 8-7-17  
 [facility] [address] [place] [address] [place] [facility]  
  
    
 6/1/2018  8:30 AM  
Any with Og Barker MD  
[address] [place] [address] Oncology at [facility]) Appt Note: onc 6 mth f/u  
 [address] [facility] [address] [phone]  
[address] [facility] [address] [facility] [address] Dates Dr Kal Kat Upcoming Health Maintenance Date Due DTaP/Tdap/Td series (1 - Tdap) 3/3/1983 PAP AKA CERVICAL CYTOLOGY 9/1/2014 Influenza Age 5 to Adult 8/1/2017 Pneumococcal 19-64 Highest Risk (2 of 3 - PPSV23) 8/23/2017 COLONOSCOPY 11/7/2021 Allergies as of 12/29/2017  Review Complete On: 12/29/2017 By: Teddy Marinelli MD  
  
 Severity Noted Reaction Type Reactions Lisinopril  06/11/2012   Side Effect Rash, Angioedema Pcn [Penicillins]  03/30/2010    Other (comments) Hallucinations, confusion Septra [Sulfamethoprim Ds]  06/29/2014    Rash Current Immunizations  Reviewed on 6/30/2017 Name Date Influenza Vaccine (Quad) PF  Incomplete, 11/3/2016 Influenza Vaccine PF 11/21/2014 Pneumococcal Conjugate (PCV-13) 6/28/2017 Not reviewed this visit You Were Diagnosed With   
  
 Codes Comments Neck pain    -  Primary ICD-10-CM: M54.2 ICD-9-CM: 723.1 Encounter for immunization     ICD-10-CM: Z72 ICD-9-CM: V03.89 Vitals BP Pulse Temp Resp Height(growth percentile) Weight(growth percentile) 127/78 (BP 1 Location: Left arm, BP Patient Position: Sitting) 65 98.1 °F (36.7 °C) (Oral) 18 5' 8\" (1.727 m) 166 lb 12.8 oz (75.7 kg) LMP SpO2 BMI OB Status Smoking Status 04/01/2015 100% 25.36 kg/m2 Postmenopausal Never Smoker Vitals History BMI and BSA Data Body Mass Index Body Surface Area  
 25.36 kg/m 2 1.91 m 2 Preferred Pharmacy Pharmacy Name Phone [id number] [address] [place], [place] [phone] Your Updated Medication List  
  
   
This list is accurate as of: 12/29/17  2:29 PM.  Always use your most recent med list.  
  
  
  
  
 Rosia Areas 1 % topical cream  
Generic drug:  penciclovir We Performed the Following INFLUENZA VIRUS VAC QUAD,SPLIT,PRESV FREE SYRINGE IM [id number] CPT(R)] Follow-up Instructions Return if symptoms worsen or fail to improve. To-Do List   
 12/29/2017 Imaging:  XR SPINE CERV 4 OR 5 V Patient Instructions Use heat, aleve 1-2 twice a day as needed. Neck Spasm: Exercises Your Care Instructions Here are some examples of typical rehabilitation exercises for your condition. Start each exercise slowly. Ease off the exercise if you start to have pain. Your doctor or physical therapist will tell you when you can start these exercises and which ones will work best for you. How to do the exercises Levator scapula stretch 1. Sit in a firm chair, or stand up straight. 2. Gently tilt your head toward your left shoulder.  
3. Turn your head to look down into your armpit, bending your head slightly forward. Let the weight of your head stretch your neck muscles. 4. Hold for 15 to 30 seconds. 5. Return to your starting position. 6. Follow the same instructions above, but tilt your head toward your right shoulder. 7. Repeat 2 to 4 times toward each shoulder. Upper trapezius stretch 1. Sit in a firm chair, or stand up straight. 2. This stretch works best if you keep your shoulder down as you lean away from it. To help you remember to do this, start by relaxing your shoulders and lightly holding on to your thighs or your chair. 3. Tilt your head toward your shoulder and hold for 15 to 30 seconds. Let the weight of your head stretch your muscles. 4. If you would like a little added stretch, place your arm behind your back. Use the arm opposite of the direction you are tilting your head. For example, if you are tilting your head to the left, place your right arm behind your back. 5. Repeat 2 to 4 times toward each shoulder. Neck rotation 1. Sit in a firm chair, or stand up straight. 2. Keeping your chin level, turn your head to the right, and hold for 15 to 30 seconds. 3. Turn your head to the left, and hold for 15 to 30 seconds. 4. Repeat 2 to 4 times to each side. Chin tuck 1. Lie on the floor with a rolled-up towel under your neck. Your head should be touching the floor. 2. Slowly bring your chin toward the front of your neck. 3. Hold for a count of 6, and then relax for up to 10 seconds. 4. Repeat 8 to 12 times. Forward neck flexion 1. Sit in a firm chair, or stand up straight. 2. Bend your head forward. 3. Hold for 15 to 30 seconds, then return to your starting position. 4. Repeat 2 to 4 times. Follow-up care is a key part of your treatment and safety. Be sure to make and go to all appointments, and call your doctor if you are having problems. It's also a good idea to know your test results and keep a list of the medicines you take. Where can you learn more? Go to http://blanka-nilson.info/. Enter P962 in the search box to learn more about \"Neck Spasm: Exercises. \" Current as of: March 21, 2017 Content Version: 11.4 © 0803-0830 [employer]. Care instructions adapted under license by [employer] (which disclaims liability or warranty for this information). If you have questions about a medical condition or this instruction, always ask your healthcare professional. [address] any warranty or liability for your use of this information. Introducing [facility]! Dear Diamante Gray: Thank you for requesting a Mustbin account. Our records indicate that you already have an active Mustbin account. You can access your account anytime at [email or web address] Did you know that you can access your hospital and ER discharge instructions at any time in Mustbin? You can also review all of your test results from your hospital stay or ER visit. Additional Information If you have questions, please visit the Frequently Asked Questions section of the Mustbin website at [email or web address] Remember, Mustbin is NOT to be used for urgent needs. For medical emergencies, dial 911. Now available from your iPhone and Android! Please provide this summary of care documentation to your next provider. Your primary care clinician is listed as Eladio Fernandez. If you have any questions after today's visit, please call [phone].

## 2017-12-29 NOTE — PATIENT INSTRUCTIONS
Use heat, aleve 1-2 twice a day as needed. Neck Spasm: Exercises  Your Care Instructions  Here are some examples of typical rehabilitation exercises for your condition. Start each exercise slowly. Ease off the exercise if you start to have pain. Your doctor or physical therapist will tell you when you can start these exercises and which ones will work best for you. How to do the exercises  Levator scapula stretch    1. Sit in a firm chair, or stand up straight. 2. Gently tilt your head toward your left shoulder. 3. Turn your head to look down into your armpit, bending your head slightly forward. Let the weight of your head stretch your neck muscles. 4. Hold for 15 to 30 seconds. 5. Return to your starting position. 6. Follow the same instructions above, but tilt your head toward your right shoulder. 7. Repeat 2 to 4 times toward each shoulder. Upper trapezius stretch    1. Sit in a firm chair, or stand up straight. 2. This stretch works best if you keep your shoulder down as you lean away from it. To help you remember to do this, start by relaxing your shoulders and lightly holding on to your thighs or your chair. 3. Tilt your head toward your shoulder and hold for 15 to 30 seconds. Let the weight of your head stretch your muscles. 4. If you would like a little added stretch, place your arm behind your back. Use the arm opposite of the direction you are tilting your head. For example, if you are tilting your head to the left, place your right arm behind your back. 5. Repeat 2 to 4 times toward each shoulder. Neck rotation    1. Sit in a firm chair, or stand up straight. 2. Keeping your chin level, turn your head to the right, and hold for 15 to 30 seconds. 3. Turn your head to the left, and hold for 15 to 30 seconds. 4. Repeat 2 to 4 times to each side. Chin tuck    1. Lie on the floor with a rolled-up towel under your neck. Your head should be touching the floor.   2. Slowly bring your chin toward the front of your neck. 3. Hold for a count of 6, and then relax for up to 10 seconds. 4. Repeat 8 to 12 times. Forward neck flexion    1. Sit in a firm chair, or stand up straight. 2. Bend your head forward. 3. Hold for 15 to 30 seconds, then return to your starting position. 4. Repeat 2 to 4 times. Follow-up care is a key part of your treatment and safety. Be sure to make and go to all appointments, and call your doctor if you are having problems. It's also a good idea to know your test results and keep a list of the medicines you take. Where can you learn more? Go to http://blanka-nilson.info/. Enter P962 in the search box to learn more about \"Neck Spasm: Exercises. \"  Current as of: March 21, 2017  Content Version: 11.4  © 6805-5688 Healthwise, Incorporated. Care instructions adapted under license by [employer] (which disclaims liability or warranty for this information). If you have questions about a medical condition or this instruction, always ask your healthcare professional. [address] any warranty or liability for your use of this information.

## 2017-12-29 NOTE — PROGRESS NOTES
Chief Complaint   Patient presents with    Neck Pain     states achy pain x 3 weeks     Visit Vitals    /78 (BP 1 Location: Left arm, BP Patient Position: Sitting)    Pulse 65    Temp 98.1 °F (36.7 °C) (Oral)    Resp 18    Ht 5' 8\" (1.727 m)    Wt 166 lb 12.8 oz (75.7 kg)    LMP 04/01/2015    SpO2 100%    BMI 25.36 kg/m2     Reviewed record In preparation for visit and have obtained necessary documentation    1. Have you been to the ER, urgent care clinic since your last visit? Hospitalized since your last visit? NO    2. Have you seen or consulted any other health care providers outside of the [address] [place] [address] since your last visit? Include any pap smears or colon screening. NO    Patient does not have advance directive on file.

## 2017-12-29 NOTE — PROGRESS NOTES
Edgar Loza is a 56 y.o. female who presents with complaints of neck pain. Onset 3 weeks. Worse at night. Pain on left side of neck, worse with movement. Some left upper arm and shoulder pain, that was present before. Has had numbness in both hands and feet, on and off, since chemo. No radicular sx. Has take aleve one tablet with some relief. Some tylenol, with a little relief. Past Medical History:   Diagnosis Date    ARF (acute renal failure) ([facility] [address] 3/12/11    as of 8/11/15 pt states has resolved and is not currently being folllowed by specialist    Autoimmune disease ([facility] [address] 2016    SJOURN SYNDROM NEW DX & RA    Cancer ([facility] [address] 7/2015    triple (-) breast ca    Chest pain     Per Pt on Rt side and arm pit only, resolves with Tylenol and states she believes it is related to her tumor    Colon polyp     Dizziness     pt states it is vertigo    Echocardiogram normal 2/19/16    Family hx of colon cancer     History of chemotherapy     AUG 2015- JAN 2016    Ill-defined condition     HAD CHEMO LAST JAN 2016    Ill-defined condition     rhematoid arthritis,sjogren syndrome    Nephrotic syndrome 3/12/2011    biopsy inconclusive but steroid responsive    TMJ (temporomandibular joint disorder)     Toothache     being treated for tooth ache x 1 week. pain better with treatment       Family History   Problem Relation Age of Onset   [facility] [address] Mother      colon cancer    Hypertension Mother     Kidney Disease Mother      dialysis    Alcohol abuse Father     Stroke Sister     Cancer Brother      bone cancer    Anemia Daughter     Diabetes Brother     Heart Surgery Sister      CABG 3 WAY    Heart Attack Sister     No Known Problems Brother     Anesth Problems Son     Delayed Awakening Son        Social History     Social History    Marital status:      Spouse name: N/A    Number of children: N/A    Years of education: N/A     Occupational History    Not on file.      Social History Main Topics    Smoking status: Never Smoker    Smokeless tobacco: Never Used    Alcohol use No    Drug use: No    Sexual activity: Yes     Partners: Male     Birth control/ protection: None     Other Topics Concern    Not on file     Social History Narrative       Current Outpatient Prescriptions on File Prior to Visit   Medication Sig Dispense Refill    DENAVIR 1 % topical cream        No current facility-administered medications on file prior to visit. Review of Systems  Pertinent items are noted in HPI. Objective:     Visit Vitals    /78 (BP 1 Location: Left arm, BP Patient Position: Sitting)    Pulse 65    Temp 98.1 °F (36.7 °C) (Oral)    Resp 18    Ht 5' 8\" (1.727 m)    Wt 166 lb 12.8 oz (75.7 kg)    LMP 04/01/2015    SpO2 100%    BMI 25.36 kg/m2     Gen: well appearing female  HEENT:   PERRL,normal conjunctiva. External ear and canals normal, TMs no opacification or erythema,  OP no erythema, no exudates, MMM  Neck:   No masses or LAD, tender left neck musculature. Resp:  No wheezing, no rhonchi, no rales. CV:  RRR, normal S1S2, no murmur. Assessment/Plan:       ICD-10-CM ICD-9-CM    1. Neck pain M54.2 723.1 XR SPINE CERV 4 OR 5 V   2. Encounter for immunization Z23 V03.89 INFLUENZA VIRUS VAC QUAD,SPLIT,PRESV FREE SYRINGE IM       Follow-up Disposition:  Return if symptoms worsen or fail to improve.     Britta Kumar MD

## 2018-01-03 ENCOUNTER — HOSPITAL ENCOUNTER (EMERGENCY)
Age: 58
Discharge: HOME | End: 2018-01-03
Attending: FAMILY MEDICINE

## 2018-01-03 VITALS
TEMPERATURE: 98.5 F | SYSTOLIC BLOOD PRESSURE: 145 MMHG | WEIGHT: 165 LBS | OXYGEN SATURATION: 97 % | BODY MASS INDEX: 25.01 KG/M2 | HEART RATE: 58 BPM | HEIGHT: 68 IN | DIASTOLIC BLOOD PRESSURE: 80 MMHG | RESPIRATION RATE: 18 BRPM

## 2018-01-03 DIAGNOSIS — H10.33 ACUTE CONJUNCTIVITIS OF BOTH EYES, UNSPECIFIED ACUTE CONJUNCTIVITIS TYPE: Primary | ICD-10-CM

## 2018-01-03 RX ORDER — CIPROFLOXACIN HYDROCHLORIDE 3.5 MG/ML
1 SOLUTION/ DROPS TOPICAL 4 TIMES DAILY
Qty: 2.5 ML | Refills: 0 | Status: SHIPPED | OUTPATIENT
Start: 2018-01-03 | End: 2018-01-10

## 2018-01-04 ENCOUNTER — TELEPHONE (OUTPATIENT)
Dept: INTERNAL MEDICINE CLINIC | Age: 58
End: 2018-01-04

## 2018-01-04 NOTE — DISCHARGE INSTRUCTIONS
Pinkeye: Care Instructions  Your Care Instructions    Pinkeye is redness and swelling of the eye surface and the conjunctiva (the lining of the eyelid and the covering of the white part of the eye). Pinkeye is also called conjunctivitis. Pinkeye is often caused by infection with bacteria or a virus. Dry air, allergies, smoke, and chemicals are other common causes. Pinkeye often clears on its own in 7 to 10 days. Antibiotics only help if the pinkeye is caused by bacteria. Pinkeye caused by infection spreads easily. If an allergy or chemical is causing pinkeye, it will not go away unless you can avoid whatever is causing it. Follow-up care is a key part of your treatment and safety. Be sure to make and go to all appointments, and call your doctor if you are having problems. It's also a good idea to know your test results and keep a list of the medicines you take. How can you care for yourself at home? · Wash your hands often. Always wash them before and after you treat pinkeye or touch your eyes or face. · Use moist cotton or a clean, wet cloth to remove crust. Wipe from the inside corner of the eye to the outside. Use a clean part of the cloth for each wipe. · Put cold or warm wet cloths on your eye a few times a day if the eye hurts. · Do not wear contact lenses or eye makeup until the pinkeye is gone. Throw away any eye makeup you were using when you got pinkeye. Clean your contacts and storage case. If you wear disposable contacts, use a new pair when your eye has cleared and it is safe to wear contacts again. · If the doctor gave you antibiotic ointment or eyedrops, use them as directed. Use the medicine for as long as instructed, even if your eye starts looking better soon. Keep the bottle tip clean, and do not let it touch the eye area. · To put in eyedrops or ointment:  ¨ Tilt your head back, and pull your lower eyelid down with one finger.   ¨ Drop or squirt the medicine inside the lower lid.  ¨ Close your eye for 30 to 60 seconds to let the drops or ointment move around. ¨ Do not touch the ointment or dropper tip to your eyelashes or any other surface. · Do not share towels, pillows, or washcloths while you have pinkeye. When should you call for help? Call your doctor now or seek immediate medical care if:  ? · You have pain in your eye, not just irritation on the surface. ? · You have a change in vision or loss of vision. ? · You have an increase in discharge from the eye.   ? · Your eye has not started to improve or begins to get worse within 48 hours after you start using antibiotics. ? · Pinkeye lasts longer than 7 days. ? Watch closely for changes in your health, and be sure to contact your doctor if you have any problems. Where can you learn more? Go to http://blanka-nilson.info/. Enter Y392 in the search box to learn more about \"Pinkeye: Care Instructions. \"  Current as of: March 20, 2017  Content Version: 11.4  © 4017-8620 Healthwise, Incorporated. Care instructions adapted under license by [employer] (which disclaims liability or warranty for this information). If you have questions about a medical condition or this instruction, always ask your healthcare professional. [address] any warranty or liability for your use of this information.

## 2018-01-04 NOTE — TELEPHONE ENCOUNTER
----- Message from Daniel Hill sent at 1/3/2018  4:49 PM EST -----  Regarding: Dr. Gavi Santos: [phone]  Pt. Requesting to speak with Dr. Patricia Hartman in regards to status of prescription for pink eye.        Message copied/pasted from [facility]

## 2018-01-04 NOTE — UC PROVIDER NOTE
HPI Comments:   Here for possible pink eye. Woke up today with irritated itchy eyes with some discharge and crusting. Discharge has not continued. Hx of Sjogren's has been using natural tears drops without resolution. Ovearll not improved. Denies eye pain or vision change. No headaches or sinus pain. No other URI symptoms. Patient is a 56 y.o. female presenting with conjunctivitis. Pink Eye    Pertinent negatives include no weakness and no fever. Past Medical History:   Diagnosis Date    ARF (acute renal failure) ([facility] [address] 3/12/11    as of 8/11/15 pt states has resolved and is not currently being folllowed by specialist    Autoimmune disease ([facility] [address] 2016    SJOURN SYNDROM NEW DX & RA    Cancer ([facility] [address] 7/2015    triple (-) breast ca    Chest pain     Per Pt on Rt side and arm pit only, resolves with Tylenol and states she believes it is related to her tumor    Colon polyp     Dizziness     pt states it is vertigo    Echocardiogram normal 2/19/16    Family hx of colon cancer     History of chemotherapy     AUG 2015- JAN 2016    Ill-defined condition     HAD CHEMO LAST JAN 2016    Ill-defined condition     rhematoid arthritis,sjogren syndrome    Nephrotic syndrome 3/12/2011    biopsy inconclusive but steroid responsive    TMJ (temporomandibular joint disorder)     Toothache     being treated for tooth ache x 1 week.  pain better with treatment        Past Surgical History:   Procedure Laterality Date    BREAST SURGERY PROCEDURE UNLISTED Right 8/13/15    RIGHT axillary SLNB and port a cath insertion    ENDOSCOPY, COLON, DIAGNOSTIC  12/12/11    Dr. Kirk Wolf Right 7/2015    HX BREAST RECONSTRUCTION Bilateral 2/23/2016    BREAST RECONSTRUCTION performed by Andrews Ashby MD at [address] [place] [address] Bilateral 5/23/2016    REMOVAL TISSUE EXPANDERS BILATERAL BREAST / RECONSTRUCTION BILATERAL BREASTS / GEL IMPLANTS  performed by Andrews Ashby MD at [facility] AMBULATORY OR    HX BREAST RECONSTRUCTION Bilateral 12/19/2016    REVISION BILATERAL BREAST RECONSTRUCTION WITH EXCISION OF EXCESS TISSUE / LATERAL BREAST BILATERAL NIPPLE RECONSTRUCTION WITH FTSG  performed by Juan Day MD at [address] [facility]X GI      COLONOSCOPY    HX GI      ENDOSCOPY    HX GYN      uterine ablation    HX MASTECTOMY Bilateral 2/23/2016    BILATERAL BREAST MASTECTOMY, BILATERAL BREAST RECONSTRUCTION WITH TISSUE EXPANDERS AFTER MASTECTOMIES REMOVE PORT LEFT CHEST performed by Magy Lester MD at [address] HX TONSILLECTOMY      HX TUBAL LIGATION      HX VASCULAR ACCESS      PORT-A-CATH    HX VASCULAR ACCESS      PORT-A-CATH REMOVAL         Family History   Problem Relation Age of Onset    Cancer Mother      colon cancer    Hypertension Mother     Kidney Disease Mother      dialysis    Alcohol abuse Father     Stroke Sister     Cancer Brother      bone cancer    Anemia Daughter     Diabetes Brother     Heart Surgery Sister      CABG [address]    Heart Attack Sister     No Known Problems Brother     Anesth Problems Son     Delayed Awakening Son         Social History     Social History    Marital status:      Spouse name: N/A    Number of children: N/A    Years of education: N/A     Occupational History    Not on file. Social History Main Topics    Smoking status: Never Smoker    Smokeless tobacco: Never Used    Alcohol use No    Drug use: No    Sexual activity: Yes     Partners: Male     Birth control/ protection: None     Other Topics Concern    Not on file     Social History Narrative                ALLERGIES: Lisinopril; Pcn [penicillins]; and Septra [sulfamethoprim ds]    Review of Systems   Constitutional: Negative for fatigue and fever. Neurological: Negative for weakness. All other systems reviewed and are negative.       Vitals:    01/03/18 1909   BP: 145/80   Pulse: (!) 58   Resp: 18   Temp: 98.5 °F (36.9 °C) SpO2: 97%   Weight: 74.8 kg (165 lb)   Height: 5' 8\" (1.727 m)       Physical Exam   Constitutional: She is oriented to person, place, and time. No distress. HENT:   Nose: Nose normal.   Mouth/Throat: Oropharynx is clear and moist. No oropharyngeal exudate. Eyes: Conjunctivae and EOM are normal. Pupils are equal, round, and reactive to light. Right eye exhibits no discharge. Left eye exhibits no discharge. No scleral icterus. Neck: Normal range of motion. Neck supple. Cardiovascular: Normal rate, regular rhythm and normal heart sounds. Pulmonary/Chest: Effort normal and breath sounds normal.   Lymphadenopathy:     She has no cervical adenopathy. Neurological: She is alert and oriented to person, place, and time. Skin: Skin is warm and dry. No rash noted. She is not diaphoretic. Psychiatric: She has a normal mood and affect. Her behavior is normal. Thought content normal.       MDM     Differential Diagnosis; Clinical Impression; Plan:       CLINICAL IMPRESSION:  (H10.33) Acute conjunctivitis of both eyes, unspecified acute conjunctivitis type  (primary encounter diagnosis)    Orders Placed This Encounter      ciprofloxacin HCl (CILOXAN) 0.3 % ophthalmic solution    Watch and wait for 1-2 days. DDX dry eye 2/2 Sjogren's or viral    Plan:  1. Moist compresses  2. See above orders. Continue dry eye drops. 3. Follow up with PCP without some improvement in next 4-5 days.     Risk of Significant Complications, Morbidity, and/or Mortality:   Presenting problems:  Low  Diagnostic procedures:  Low  Management options:  Low  Progress:   Patient progress:  Stable      Procedures

## 2018-01-04 NOTE — TELEPHONE ENCOUNTER
Regarding: RE: Non-Urgent Medical Question  Contact: [phone]  ----- Message from Zechariah Jones LPN sent at 3/4/8123  4:09 PM EST -----       ----- Message from Josh Coelho to Alfie Waller MD sent at 1/3/2018  1:31 PM -----   Hi,  Thank you for responding. Yes I do have some drainage. I had more drainage this morning when I woke up.  ----- Message -----  From: Zechariah Jones LPN  Sent: 7/8/7764 74:29 AM EST  To: Joby Saldana  Subject: RE: Non-Urgent Medical Question    Good morning Ms. Saldana,     Do you have any drainage coming from your eyes? I am also forwarding your request to Dr. Shweta Light. Thanks,     Zechariah Jones, [address] [place]   Please note- My Chart is for non-urgent health concerns. You can expect a reply from our office within 2 business days. You should not email your provider about emergent health issues. If you have an emergency, please call 911. If you have an urgent concern, please call our office at [phone].      ----- Message -----     From: Joby Saldana     Sent: 1/3/2018  9:03 AM EST       To: Alfie Waller MD  Subject: Non-Urgent Medical Question    Good morning,  My grandson has conjunctivitis and is often at my house. This morning when I woke up both of my eyes were extremely red and itchy. The left eye is more irritated, but both eyes are red. Is it possible for me to get a prescription or do I need to see a doctor? Please let me know how to proceed. Thank you for your help and have a wonderful day!

## 2018-01-26 ENCOUNTER — OFFICE VISIT (OUTPATIENT)
Dept: CARDIOLOGY CLINIC | Age: 58
End: 2018-01-26

## 2018-01-26 VITALS
OXYGEN SATURATION: 98 % | HEART RATE: 63 BPM | SYSTOLIC BLOOD PRESSURE: 124 MMHG | HEIGHT: 68 IN | WEIGHT: 164.5 LBS | DIASTOLIC BLOOD PRESSURE: 92 MMHG | BODY MASS INDEX: 24.93 KG/M2

## 2018-01-26 DIAGNOSIS — R07.89 OTHER CHEST PAIN: ICD-10-CM

## 2018-01-26 DIAGNOSIS — Z82.49 FAM HX-ISCHEM HEART DISEASE: ICD-10-CM

## 2018-01-26 DIAGNOSIS — C50.911 MALIGNANT NEOPLASM OF RIGHT FEMALE BREAST, UNSPECIFIED ESTROGEN RECEPTOR STATUS, UNSPECIFIED SITE OF BREAST (HCC): ICD-10-CM

## 2018-01-26 DIAGNOSIS — R06.02 SOB (SHORTNESS OF BREATH): Primary | ICD-10-CM

## 2018-01-26 DIAGNOSIS — Z90.13 S/P BILATERAL MASTECTOMY: ICD-10-CM

## 2018-01-26 NOTE — PROGRESS NOTES
Chief Complaint   Patient presents with    Chest Pain    Shortness of Breath     1. Have you been to the ER, urgent care clinic since your last visit? Hospitalized since your last visit? NO    2. Have you seen or consulted any other health care providers outside of the [address] [place] [address] since your last visit? Include any pap smears or colon screening. NO

## 2018-01-26 NOTE — PROGRESS NOTES
ROCIO Garcia        NAME:  Jaime Salmeron   :   1960   MRN:   [id number]   PCP:  Kamilah Villa MD           Subjective: The patient is a 56y.o. year old female who complains of chest pain. The chest pain began few mo ago and the pattern has been recurrent. The chest pain is described as a mild pain. The chest pain is described as being located in the sternal area. The chest pain radiates to the left sternal border. There are no precipitating factors. The symptoms have no aggravating factors. The symptoms have no relieving factors. No associated symptoms. Each episode lasts  +/- 10 min. In addition, she reports HARRIS with stairs. Fam hx include sister with MI at 50, paternal uncle MI, paternal cousin with MI.      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    Fibroid uterus D25.9    Breast cancer of upper-inner quadrant of right female breast ([address] C50.211    Cancer of right breast ([address] C50.911    S/P bilateral mastectomy Z90.13    Rheumatoid factor positive R76.8    Positive ALEXIS (antinuclear antibody) R76.8    Sjogren's syndrome ([address] M35.00       Past Medical History:   Diagnosis Date    ARF (acute renal failure) ([address] 3/12/11    as of 8/11/15 pt states has resolved and is not currently being folllowed by specialist    Autoimmune disease ([address] 2016    SJOURN SYNDROM NEW DX & RA    Cancer ([address] 2015    triple (-) breast ca    Chest pain     Per Pt on Rt side and arm pit only, resolves with Tylenol and states she believes it is related to her tumor    Colon polyp     Dizziness     pt states it is vertigo    Echocardiogram normal 16    Family hx of colon cancer     History of chemotherapy     AUG 2015- 2016    Ill-defined condition     HAD CHEMO LAST 2016    Ill-defined condition     rhematoid arthritis,sjogren syndrome    Nephrotic syndrome 3/12/2011    biopsy inconclusive but steroid responsive    TMJ (temporomandibular joint disorder)     Toothache     being treated for tooth ache x 1 week. pain better with treatment     Social History     Social History    Marital status:      Spouse name: N/A    Number of children: N/A    Years of education: N/A     Occupational History    Not on file. Social History Main Topics    Smoking status: Never Smoker    Smokeless tobacco: Never Used    Alcohol use No    Drug use: No    Sexual activity: Yes     Partners: Male     Birth control/ protection: None     Other Topics Concern    Not on file     Social History Narrative     Family History   Problem Relation Age of Onset   [facility] Cancer Mother      colon cancer    Hypertension Mother     Kidney Disease Mother      dialysis    Alcohol abuse Father     Stroke Sister     Cancer Brother      bone cancer    Anemia Daughter     Diabetes Brother     Heart Surgery Sister      CABG 3 WAY    Heart Attack Sister     No Known Problems Brother     Anesth Problems Son     Delayed Awakening Son        Review of Systems  General: Pt denies excessive weight gain or loss. Pt is able to conduct ADL's  HEENT: Denies blurred vision, headaches, epistaxis and difficulty swallowing. Respiratory: Denies shortness of breath, HARRIS, wheezing or stridor. Cardiovascular: Reports precordial pain,Denies palpitations, edema or PND  Gastrointestinal: Denies poor appetite, indigestion, abdominal pain or blood in stool  Musculoskeletal: Denies pain or swelling from muscles or joints  Neurologic: Denies tremor, paresthesias, or sensory motor disturbance  Skin: Denies rash, itching or texture change.       Objective:       Vitals:    01/26/18 1327   BP: (!) 124/92   Pulse: 63   SpO2: 98%   Weight: 164 lb 8 oz (74.6 kg)   Height: 5' 8\" (1.727 m)    Body mass index is 25.01 kg/(m^2). General   Mental Status - Alert. General Appearance - Not in acute distress. Chest and Lung Exam   Inspection: Accessory muscles - No use of accessory muscles in breathing. Auscultation:   Breath sounds: - Normal.    Cardiovascular   Inspection: Jugular vein - Bilateral - Inspection Normal.  Palpation/Percussion:   Apical Impulse: - Normal.  Auscultation: Rhythm - Regular. Heart Sounds - S1 WNL and S2 WNL. No S3 or S4. Murmurs & Other Heart Sounds: Auscultation of the heart reveals - No Murmurs. Peripheral Vascular   Upper Extremity: Inspection - Bilateral - No Cyanotic nailbeds or Digital clubbing. Lower Extremity:   Palpation: Edema - Bilateral - No edema. Data Review:     EKG -Sinus  Rhythm   -Right bundle branch block. LABS-   Lab Results   Component Value Date/Time    Cholesterol, total 193 06/08/2017 08:10 AM    HDL Cholesterol 58 06/08/2017 08:10 AM    LDL, calculated 118 06/08/2017 08:10 AM    VLDL, calculated 17 06/08/2017 08:10 AM    Triglyceride 87 06/08/2017 08:10 AM           Medications reviewed  Current Outpatient Prescriptions   Medication Sig    NAPROXEN SODIUM (ALEVE PO) Take  by mouth as needed.  ACETAMINOPHEN (TYLENOL PO) Take  by mouth as needed.  DENAVIR 1 % topical cream      No current facility-administered medications for this visit. 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 C50.211    Cancer of right breast ([facility] C50.911    S/P bilateral mastectomy Z90.13    Rheumatoid factor positive R76.8    Positive ALEXIS (antinuclear antibody) R76.8    Sjogren's syndrome ([facility] M35.00         ICD-10-CM ICD-9-CM    1.  SOB (shortness of breath) R06.02 786.05 AMB POC EKG ROUTINE W/ 12 LEADS, INTER & REP      2D ECHO COMPLETE ADULT (TTE) W OR WO CONTR      STRESS TEST LEXISCAN/CARDIOLITE   2. S/P bilateral mastectomy Z90.13 V45.71 2D ECHO COMPLETE ADULT (TTE) W OR WO CONTR      STRESS TEST LEXISCAN/CARDIOLITE   3. Malignant neoplasm of right female breast, unspecified estrogen receptor status, unspecified site of breast ([facility] C50.911 174.9 2D ECHO COMPLETE ADULT (TTE) W OR WO CONTR      STRESS TEST LEXISCAN/CARDIOLITE   4. Fam hx-ischem heart disease Z82.49 V17.3 2D ECHO COMPLETE ADULT (TTE) W OR WO CONTR      STRESS TEST LEXISCAN/CARDIOLITE   5. Other chest pain R07.89 786.59 2D ECHO COMPLETE ADULT (TTE) W OR WO CONTR      STRESS TEST LEXISCAN/CARDIOLITE       Plan:     Patient presents with complaints of chest pain. Risk factors for coronary artery disease include: post menopausal, chemo hx, RA, and fam hx. Will evaluate for ischemia with nuclear stress test and structural heart disease with echo. On daily ASA. If her stress test is normal, coronary calcium scoring would be reassuring if totally normal and threshold for further testing/treatment would be decreased if high- the patient wishes to proceed (she will call [address] [place] [address] to schedule)  and will call for my input on results after testing is completed. Follow up in 6 months if testing normal and no progressive symptoms after gradual increase exercise and improved diet.       Saadia Martin MD

## 2018-01-26 NOTE — MR AVS SNAPSHOT
[address] [place] 
[phone] Patient: Richard Franco MRN:  [id number] Visit Information Date & Time Provider Department Dept. Phone Encounter #  
 1/26/2018  1:30 PM Cristian Arreola, [address] [facility] [phone] Follow-up Instructions Routing History Your Appointments 2/8/2018  9:00 AM  
Follow Up with BINTA Root 22 ([facility]) Appt Note: 6 month follow up / cp $40 ct 8-7-17  
 [facility] [address] [address]  
  
    
 2/14/2018 10:00 AM  
NUCLEAR MEDICINE with NUCLEAR, [facility]) Appt Note: echo: 2D ECHO COMPLETE ADULT (TTE) W OR WO CONTR [[id number]] (Order [id number])  cardiolite STRESS TEST LEXISCAN/CARDIOLITE [[id number]] (Order [id number]) 5'8\" 164 lbs [address] [facility]  
[phone] [address] [facility]  
  
    
 6/1/2018  8:30 AM  
Any with Werner Diaz MD  
[address] Oncology at [facility]) Appt Note: onc 6 mth f/u  
 [address] [place] [address] [address] [address]  
327 [facility] 600 [facility] [address] Kal Kat Upcoming Health Maintenance Date Due DTaP/Tdap/Td series (1 - Tdap) 3/3/1983 PAP AKA CERVICAL CYTOLOGY 9/1/2014 BREAST CANCER SCRN MAMMOGRAM 7/17/2017 Pneumococcal 19-64 Highest Risk (2 of 3 - PPSV23) 8/23/2017 COLONOSCOPY 11/7/2021 Allergies as of 1/26/2018  Review Complete On: 1/26/2018 By: Cristian Arreola MD  
  
 Severity Noted Reaction Type Reactions Lisinopril  06/11/2012   Side Effect Rash, Angioedema Pcn [Penicillins]  03/30/2010    Other (comments) Hallucinations, confusion Septra [Sulfamethoprim Ds]  06/29/2014    Rash Current Immunizations  Reviewed on 12/29/2017 Name Date Influenza Vaccine (Quad) PF 12/29/2017, 11/3/2016 Influenza Vaccine PF 11/21/2014 Pneumococcal Conjugate (PCV-13) 6/28/2017 Not reviewed this visit You Were Diagnosed With   
  
 Codes Comments SOB (shortness of breath)    -  Primary ICD-10-CM: R06.02 
ICD-9-CM: 786.05 S/P bilateral mastectomy     ICD-10-CM: Z90.13 ICD-9-CM: V45.71 Malignant neoplasm of right female breast, unspecified estrogen receptor status, unspecified site of breast [facility]     ICD-10-CM: C50.911 ICD-9-CM: 174.9 Fam hx-ischem heart disease     ICD-10-CM: Z82.49 
ICD-9-CM: V17.3 Other chest pain     ICD-10-CM: R07.89 ICD-9-CM: 786.59 Vitals BP Pulse Height(growth percentile) Weight(growth percentile) LMP SpO2  
 (!) 124/92 63 5' 8\" (1.727 m) 164 lb 8 oz (74.6 kg) 04/01/2015 98% BMI OB Status Smoking Status 25.01 kg/m2 Postmenopausal Never Smoker Vitals History BMI and BSA Data Body Mass Index Body Surface Area 25.01 kg/m 2 1.89 m 2 Preferred Pharmacy Pharmacy Name Phone [id number] [address] [phone] Your Updated Medication List  
  
   
This list is accurate as of: 1/26/18  2:31 PM.  Always use your most recent med list.  
  
  
  
  
 Chinyere Minion Take  by mouth as needed. DENAVIR 1 % topical cream  
Generic drug:  penciclovir TYLENOL PO Take  by mouth as needed. We Performed the Following AMB POC EKG ROUTINE W/ 12 LEADS, INTER & REP [77706 CPT(R)] To-Do List   
 Around 01/26/2018 ECHO:  2D ECHO COMPLETE ADULT (TTE) W OR WO CONTR Around 01/26/2018 ECG:  STRESS TEST LEXISCAN/CARDIOLITE Introducing \[facility]\"" & HEALTH SERVICES! Dear Debora Bunchr: Thank you for requesting a [employer] account. Our records indicate that you already have an active [employer] account. You can access your account anytime at [email or web address] Did you know that you can access your hospital and ER discharge instructions at any time in [employer]? You can also review all of your test results from your hospital stay or ER visit. Additional Information If you have questions, please visit the Frequently Asked Questions section of the [employer] website at [email or web address] Remember, [employer] is NOT to be used for urgent needs. For medical emergencies, dial 911. Now available from your iPhone and Android! Please provide this summary of care documentation to your next provider. Your primary care clinician is listed as Harlene Fothergill. If you have any questions after today's visit, please call [phone].

## 2018-02-08 ENCOUNTER — OFFICE VISIT (OUTPATIENT)
Dept: SURGERY | Age: 58
End: 2018-02-08

## 2018-02-08 VITALS
DIASTOLIC BLOOD PRESSURE: 82 MMHG | BODY MASS INDEX: 24.55 KG/M2 | SYSTOLIC BLOOD PRESSURE: 144 MMHG | HEIGHT: 68 IN | WEIGHT: 162 LBS | HEART RATE: 78 BPM

## 2018-02-08 DIAGNOSIS — Z90.13 S/P BILATERAL MASTECTOMY: ICD-10-CM

## 2018-02-08 DIAGNOSIS — C50.211 MALIGNANT NEOPLASM OF UPPER-INNER QUADRANT OF RIGHT BREAST IN FEMALE, ESTROGEN RECEPTOR NEGATIVE (HCC): Primary | ICD-10-CM

## 2018-02-08 DIAGNOSIS — Z17.1 MALIGNANT NEOPLASM OF UPPER-INNER QUADRANT OF RIGHT BREAST IN FEMALE, ESTROGEN RECEPTOR NEGATIVE (HCC): Primary | ICD-10-CM

## 2018-02-08 RX ORDER — ASPIRIN 81 MG/1
TABLET ORAL DAILY
COMMUNITY
End: 2018-06-18 | Stop reason: ALTCHOICE

## 2018-02-08 NOTE — PROGRESS NOTES
HISTORY OF PRESENT ILLNESS  Debbie Gaviria is a 56 y.o. female. Breast Cancer     ESTABLISHED patient here today for follow up RIGHT breast cancer. Has occasional bilateral breast discomfort. Had itching to LEFT breast recently, but resolved on its own after 24 hours. Denies any other breast problems at this time.      History of breast cancer-  RIGHT breast IDC T2N0 triple negative  Completed neoadjuvant chemotherapy. Followed by Dr. Theron Toure (originally 3.5 cm, 1.8 cm post neoadjuvant chemo)  2/23/16- BILATERAL mastectomy with reconstruction, RIGHT SLNB and port removal  .   No radiation. 5/2016- BILATERAL implants placed by Dr. Mel Ye. 12/2016- BILATERAL revision breast reconstruction. OB History     Obstetric Comments    Menarche:  8. LMP: 4/8/15. # of Children:  3. Age at Delivery of First Child:  14.   Hysterectomy/oophorectomy:  NO/NO. Breast Bx:  yes. Hx of Breast Feeding:  no. BCP:  yes.  Hormone therapy:  no.               Past Surgical History:   Procedure Laterality Date    BREAST SURGERY PROCEDURE UNLISTED Right 8/13/15    RIGHT axillary SLNB and port a cath insertion    ENDOSCOPY, COLON, DIAGNOSTIC  12/12/11    Dr. Zohaib Dent HX BREAST BIOPSY Right 7/2015    HX BREAST RECONSTRUCTION Bilateral 2/23/2016    BREAST RECONSTRUCTION performed by Mann Cullen MD at [address] [place] [address] Bilateral 5/23/2016    REMOVAL TISSUE EXPANDERS BILATERAL BREAST / RECONSTRUCTION BILATERAL BREASTS / GEL IMPLANTS  performed by Mann Cullen MD at [place] [address] HX BREAST RECONSTRUCTION Bilateral 12/19/2016    REVISION BILATERAL BREAST RECONSTRUCTION WITH EXCISION OF EXCESS TISSUE / LATERAL BREAST BILATERAL NIPPLE RECONSTRUCTION WITH FTSG  performed by Mann Cullen MD at [place] [address] HX GI      COLONOSCOPY    HX GI      ENDOSCOPY    HX GYN      uterine ablation    HX MASTECTOMY Bilateral 2/23/2016    BILATERAL BREAST MASTECTOMY, BILATERAL BREAST RECONSTRUCTION WITH TISSUE EXPANDERS AFTER MASTECTOMIES REMOVE PORT LEFT CHEST performed by Socorro Snyder MD at [address] HX TONSILLECTOMY      HX TUBAL LIGATION      HX VASCULAR ACCESS      PORT-A-CATH    HX VASCULAR ACCESS      PORT-A-CATH REMOVAL     No FH of breast or ovarian cancer. ROS    Physical Exam   Constitutional: She appears well-developed and well-nourished. Pulmonary/Chest: Right breast exhibits no mass, no skin change and no tenderness. Left breast exhibits no mass, no skin change and no tenderness. Musculoskeletal: Normal range of motion. UE x 2   Lymphadenopathy:     She has no cervical adenopathy. She has no axillary adenopathy. Right: No supraclavicular adenopathy present. Left: No supraclavicular adenopathy present. Skin: Skin is warm, dry and intact. Chest and breasts examined  Well healed surgical scars to breasts and chest   Psychiatric: She has a normal mood and affect. Her speech is normal and behavior is normal.     Visit Vitals    /82    Pulse 78    Ht 5' 8\" (1.727 m)    Wt 162 lb (73.5 kg)    LMP 04/01/2015    BMI 24.63 kg/m2     ASSESSMENT and PLAN  Encounter Diagnoses   Name Primary?  Malignant neoplasm of upper-inner quadrant of right breast in female, estrogen receptor negative ([facility] Yes    S/P bilateral mastectomy      Normal exam with no evidence of local recurrence. Discussed decreased risk of recurrence as she becomes farther from the date of her diagnosis. Discussed 6 months folloe-up vs 1 year and she would like to RTC here in 6 months. She will see Dr. Emilia Ortiz in the interim. She is comfortable with this plan. All questions answered and she stated understanding.

## 2018-02-08 NOTE — MR AVS SNAPSHOT
[address] [facility] 
[phone] Patient: Netty Schaumann MRN:  [id number] Visit Information Date & Time Provider Department Dept. Phone Encounter #  
 2/8/2018  9:00 AM Chad Farah  [address] [id number] Your Appointments 2/14/2018  9:00 AM  
ECHO CARDIOGRAMS 2D with [address] [facility] [address] [place] [address]) Appt Note: echo: 2D ECHO COMPLETE ADULT (TTE) W OR WO CONTR [[id number]] (Order [id number])  cardiolite STRESS TEST LEXISCAN/CARDIOLITE [[id number]] (Order [id number]) 5'8\" 164 lbs [address]  
[phone] 9[address] [address]  
  
    
 2/14/2018 10:00 AM  
NUCLEAR MEDICINE with NUCLEAR, [facility] [address]) Appt Note: echo: 2D ECHO COMPLETE ADULT (TTE) W OR WO CONTR [[id number]] (Order [id number])  cardiolite STRESS TEST LEXISCAN/CARDIOLITE [[id number]] (Order [id number]) 5'8\" 164 lbs [address]  
[phone] [address]  
  
    
 6/1/2018  8:30 AM  
Any with Hillard Oppenheim, MD  
[address] [facility] at [facility]) Appt Note: onc 6 mth f/u  
 [address] [facility]  
[phone]  
  
   
 [address]  
  
    
 8/9/2018  8:30 AM  
Follow Up with BINTA Garber 22 ([address]) Appt Note: 6 month follow up cp$40 2/8/18 TT  
 [address] [address]  
[address] [facility] Upcoming Health Maintenance Date Due DTaP/Tdap/Td series (1 - Tdap) 3/3/1983 PAP AKA CERVICAL CYTOLOGY 9/1/2014 BREAST CANCER SCRN MAMMOGRAM 7/17/2017 Pneumococcal 19-64 Highest Risk (2 of 3 - PPSV23) 8/23/2017 COLONOSCOPY 11/7/2021 Allergies as of 2/8/2018  Review Complete On: 2/8/2018 By: Yue Thomas NP Severity Noted Reaction Type Reactions Lisinopril  06/11/2012   Side Effect Rash, Angioedema Pcn [Penicillins]  03/30/2010    Other (comments) Hallucinations, confusion Septra [Sulfamethoprim Ds]  06/29/2014    Rash Current Immunizations  Reviewed on 12/29/2017 Name Date Influenza Vaccine (Quad) PF 12/29/2017, 11/3/2016 Influenza Vaccine PF 11/21/2014 Pneumococcal Conjugate (PCV-13) 6/28/2017 Not reviewed this visit You Were Diagnosed With   
  
 Codes Comments Malignant neoplasm of upper-inner quadrant of right breast in female, estrogen receptor negative ([facility]    -  Primary ICD-10-CM: C50.211, Z17.1 ICD-9-CM: 174.2, V86.1 S/P bilateral mastectomy     ICD-10-CM: Z90.13 ICD-9-CM: V45.71 Vitals BP Pulse Height(growth percentile) Weight(growth percentile) LMP BMI  
 144/82 78 5' 8\" (1.727 m) 162 lb (73.5 kg) 04/01/2015 24.63 kg/m2 OB Status Smoking Status Postmenopausal Never Smoker BMI and BSA Data Body Mass Index Body Surface Area  
 24.63 kg/m 2 1.88 m 2 Preferred Pharmacy Pharmacy Name Phone [facility] [address] [phone] Your Updated Medication List  
  
   
This list is accurate as of: 2/8/18  9:52 AM.  Always use your most recent med list.  
  
  
  
  
 ALEVE PO Take  by mouth as needed. aspirin delayed-release 81 mg tablet Take  by mouth daily. DENAVIR 1 % topical cream  
Generic drug:  penciclovir TYLENOL PO Take  by mouth as needed. Patient Instructions Breast Self-Exam: Care Instructions Your Care Instructions A breast self-exam is when you check your breasts for lumps or changes. This regular exam helps you learn how your breasts normally look and feel. Most breast problems or changes are not because of cancer. Breast self-exam is not a substitute for a mammogram. Having regular breast exams by your doctor and regular mammograms improve your chances of finding any problems with your breasts. Some women set a time each month to do a step-by-step breast self-exam. Other women like a less formal system. They might look at their breasts as they brush their teeth, or feel their breasts once in a while in the shower. If you notice a change in your breast, tell your doctor. Follow-up care is a key part of your treatment and safety. Be sure to make and go to all appointments, and call your doctor if you are having problems. It's also a good idea to know your test results and keep a list of the medicines you take. How do you do a breast self-exam? 
· The best time to examine your breasts is usually one week after your menstrual period begins. Your breasts should not be tender then. If you do not have periods, you might do your exam on a day of the month that is easy to remember. · To examine your breasts: ¨ Remove all your clothes above the waist and lie down. When you are lying down, your breast tissue spreads evenly over your chest wall, which makes it easier to feel all your breast tissue. ¨ Use the pads-not the fingertips-of the 3 middle fingers of your left hand to check your right breast. Move your fingers slowly in small coin-sized circles that overlap. ¨ Use three levels of pressure to feel of all your breast tissue. Use light pressure to feel the tissue close to the skin surface. Use medium pressure to feel a little deeper. Use firm pressure to feel your tissue close to your breastbone and ribs. Use each pressure level to feel your breast tissue before moving on to the next spot. ¨ Check your entire breast, moving up and down as if following a strip from the collarbone to the bra line, and from the armpit to the ribs. Repeat until you have covered the entire breast. 
¨ Repeat this procedure for your left breast, using the pads of the 3 middle fingers of your right hand. · To examine your breasts while in the shower: 
¨ Place one arm over your head and lightly soap your breast on that side. ¨ Using the pads of your fingers, gently move your hand over your breast (in the strip pattern described above), feeling carefully for any lumps or changes. ¨ Repeat for the other breast. 
· Have your doctor inspect anything you notice to see if you need further testing. Where can you learn more? Go to http://blanka-nilson.info/. Enter P148 in the search box to learn more about \"Breast Self-Exam: Care Instructions. \" Current as of: May 12, 2017 Content Version: 11.4 © 2982-2370 [employer]. Care instructions adapted under license by [employer] (which disclaims liability or warranty for this information). If you have questions about a medical condition or this instruction, always ask your healthcare professional. [place] [address] any warranty or liability for your use of this information. Introducing [facility] & HEALTH SERVICES! Dear Pedro Whipple: Thank you for requesting a [employer] account. Our records indicate that you already have an active [employer] account. You can access your account anytime at [email or web address] Did you know that you can access your hospital and ER discharge instructions at any time in [employer]? You can also review all of your test results from your hospital stay or ER visit. Additional Information If you have questions, please visit the Frequently Asked Questions section of the [employer] website at [email or web address] Remember, MyChart is NOT to be used for urgent needs. For medical emergencies, dial 911. Now available from your iPhone and Android! Please provide this summary of care documentation to your next provider. Your primary care clinician is listed as Jackeline Calvert. If you have any questions after today's visit, please call [phone].

## 2018-02-08 NOTE — PROGRESS NOTES
HISTORY OF PRESENT ILLNESS  Kelvin Kearns is a 56 y.o. female. HPI   ESTABLISHED patient here today for follow up RIGHT breast cancer. Has occasional bilateral breast discomfort. Had itching to LEFT breast recently. Denies any other breast problems at this time.      History of breast cancer-  RIGHT breast IDC T2N0 triple negative  Completed neoadjuvant chemotherapy. Followed by Dr. Gwen Orlando (originally 3.5 cm, 1.8 cm post neoadjuvant chemo)  2/23/16- BILATERAL mastectomy with reconstruction, RIGHT SLNB and port removal  .   No radiation. 5/2016- BILATERAL implants placed by Dr. Yvette Nash. 12/2016- BILATERAL revision breast reconstruction. No FH of breast or ovarian cancer.      ROS    Physical Exam    ASSESSMENT and PLAN  {ASSESSMENT/PLAN:[id number]}

## 2018-02-08 NOTE — PATIENT INSTRUCTIONS
Breast Self-Exam: Care Instructions  Your Care Instructions    A breast self-exam is when you check your breasts for lumps or changes. This regular exam helps you learn how your breasts normally look and feel. Most breast problems or changes are not because of cancer. Breast self-exam is not a substitute for a mammogram. Having regular breast exams by your doctor and regular mammograms improve your chances of finding any problems with your breasts. Some women set a time each month to do a step-by-step breast self-exam. Other women like a less formal system. They might look at their breasts as they brush their teeth, or feel their breasts once in a while in the shower. If you notice a change in your breast, tell your doctor. Follow-up care is a key part of your treatment and safety. Be sure to make and go to all appointments, and call your doctor if you are having problems. It's also a good idea to know your test results and keep a list of the medicines you take. How do you do a breast self-exam?  · The best time to examine your breasts is usually one week after your menstrual period begins. Your breasts should not be tender then. If you do not have periods, you might do your exam on a day of the month that is easy to remember. · To examine your breasts:  ¨ Remove all your clothes above the waist and lie down. When you are lying down, your breast tissue spreads evenly over your chest wall, which makes it easier to feel all your breast tissue. ¨ Use the pads-not the fingertips-of the 3 middle fingers of your left hand to check your right breast. Move your fingers slowly in small coin-sized circles that overlap. ¨ Use three levels of pressure to feel of all your breast tissue. Use light pressure to feel the tissue close to the skin surface. Use medium pressure to feel a little deeper. Use firm pressure to feel your tissue close to your breastbone and ribs.  Use each pressure level to feel your breast tissue before moving on to the next spot. ¨ Check your entire breast, moving up and down as if following a strip from the collarbone to the bra line, and from the armpit to the ribs. Repeat until you have covered the entire breast.  ¨ Repeat this procedure for your left breast, using the pads of the 3 middle fingers of your right hand. · To examine your breasts while in the shower:  ¨ Place one arm over your head and lightly soap your breast on that side. ¨ Using the pads of your fingers, gently move your hand over your breast (in the strip pattern described above), feeling carefully for any lumps or changes. ¨ Repeat for the other breast.  · Have your doctor inspect anything you notice to see if you need further testing. Where can you learn more? Go to http://blanka-nilson.info/. Enter P148 in the search box to learn more about \"Breast Self-Exam: Care Instructions. \"  Current as of: May 12, 2017  Content Version: 11.4  © 1502-3423 Healthwise, Incorporated. Care instructions adapted under license by [employer] (which disclaims liability or warranty for this information). If you have questions about a medical condition or this instruction, always ask your healthcare professional. [place] [address] any warranty or liability for your use of this information.

## 2018-02-14 ENCOUNTER — CLINICAL SUPPORT (OUTPATIENT)
Dept: CARDIOLOGY CLINIC | Age: 58
End: 2018-02-14

## 2018-02-14 DIAGNOSIS — R07.89 OTHER CHEST PAIN: ICD-10-CM

## 2018-02-14 DIAGNOSIS — R06.02 SOB (SHORTNESS OF BREATH): ICD-10-CM

## 2018-02-14 DIAGNOSIS — Z82.49 FAM HX-ISCHEM HEART DISEASE: ICD-10-CM

## 2018-02-14 DIAGNOSIS — C50.911 MALIGNANT NEOPLASM OF RIGHT FEMALE BREAST, UNSPECIFIED ESTROGEN RECEPTOR STATUS, UNSPECIFIED SITE OF BREAST (HCC): ICD-10-CM

## 2018-02-14 DIAGNOSIS — Z90.13 S/P BILATERAL MASTECTOMY: ICD-10-CM

## 2018-02-16 ENCOUNTER — TELEPHONE (OUTPATIENT)
Dept: CARDIOLOGY CLINIC | Age: 58
End: 2018-02-16

## 2018-02-16 NOTE — TELEPHONE ENCOUNTER
----- Message from ROCIO Fox sent at 2/16/2018 11:05 AM EST -----  Nuclear stress test is NORMAL. Thanks.

## 2018-06-01 ENCOUNTER — OFFICE VISIT (OUTPATIENT)
Dept: ONCOLOGY | Age: 58
End: 2018-06-01

## 2018-06-01 VITALS
DIASTOLIC BLOOD PRESSURE: 84 MMHG | HEART RATE: 62 BPM | SYSTOLIC BLOOD PRESSURE: 122 MMHG | RESPIRATION RATE: 16 BRPM | WEIGHT: 162.2 LBS | HEIGHT: 68 IN | OXYGEN SATURATION: 96 % | BODY MASS INDEX: 24.58 KG/M2 | TEMPERATURE: 98.4 F

## 2018-06-01 DIAGNOSIS — C50.211 MALIGNANT NEOPLASM OF UPPER-INNER QUADRANT OF RIGHT BREAST IN FEMALE, ESTROGEN RECEPTOR NEGATIVE (HCC): Primary | ICD-10-CM

## 2018-06-01 DIAGNOSIS — Z17.1 MALIGNANT NEOPLASM OF UPPER-INNER QUADRANT OF RIGHT BREAST IN FEMALE, ESTROGEN RECEPTOR NEGATIVE (HCC): Primary | ICD-10-CM

## 2018-06-01 RX ORDER — ESOMEPRAZOLE MAGNESIUM 40 MG/1
CAPSULE, DELAYED RELEASE ORAL
Refills: 0 | COMMUNITY
Start: 2018-05-09 | End: 2018-06-18 | Stop reason: ALTCHOICE

## 2018-06-01 NOTE — PROGRESS NOTES
Follow up Note        Patient: Tristen Bell MRN: [id number]  SSN: [id number]    YOB: 1960  Age: 66 y.o. Sex: female        Diagnosis:     1. Right breast carcinoma:  T2 N0 Mx (Stage IIA) infiltrating ductal carcinoma, Tumor size 3.5 cm, LN -ve, grade 3, ER -ve, KS -ve, Her 2 -ve      Treatment:     1. Neoadjuvant chemotherapy   S/p Taxol, Carboplatin - completed 9 weeks on     Therapy stopped due to significant cytopenias (11/25/16)   Adriamycin, Cytoxan -  Cycle 4 completed 1/27/16    2. Bilateral mastectomy with reconstruction by tissue expanders 2/23/16            Subjective:      Tristen Bell is a 66 y.o. female with right sided breast cancer. She felt a lump in her breast, underwent a mammogram, was noted to have a 3.5 cm mass in the medial upper part of the breast. A biopsy of the mass confirmed a nuclear grade III, ER -ve, KS -ve, Her 2 -ve. [place] LN biopsy shows no evidence of disease in the LNs. She completed neoadjuvant chemotherapy and then underwent bilateral mastectomies. She had reconstructive surgery by Dr. Renuka Ames 12/19/2017. Ms. Kyleigh Beltran is here today for follow-up. She feels well. She denies headaches, new bone pains, or weight loss. She continues to follow with rheumatology for her Sjogrens syndrome.       Review of Systems:    Constitutional: negative  Eyes: negative  Ears, Nose, Mouth, Throat, and Face: negative  Respiratory: negative  Cardiovascular: negative  Gastrointestinal: nausea  Hematologic/Lymphatic: negative  Skin: negative  Musculoskeletal: arthralgias  Neurological: negative      Past Medical History:   Diagnosis Date    ARF (acute renal failure) ([facility] 3/12/11    as of 8/11/15 pt states has resolved and is not currently being folllowed by specialist    Autoimmune disease ([facility] [address] 2016    SJOURN SYNDROM NEW DX & RA    Cancer ([facility] [address] 7/2015    triple (-) breast ca    Chest pain     Per Pt on Rt side and arm pit only, resolves with Tylenol and states she believes it is related to her tumor    Colon polyp     Dizziness     pt states it is vertigo    Echocardiogram normal 2/19/16    Family hx of colon cancer     History of chemotherapy     AUG 2015- JAN 2016    Ill-defined condition     HAD CHEMO LAST JAN 2016    Ill-defined condition     rhematoid arthritis,sjogren syndrome    Nephrotic syndrome 3/12/2011    biopsy inconclusive but steroid responsive    TMJ (temporomandibular joint disorder)     Toothache     being treated for tooth ache x 1 week.  pain better with treatment     Past Surgical History:   Procedure Laterality Date    BREAST SURGERY PROCEDURE UNLISTED Right 8/13/15    RIGHT axillary SLNB and port a cath insertion    ENDOSCOPY, COLON, DIAGNOSTIC  12/12/11    Dr. Lisa Rangel    HX BREAST BIOPSY Right 7/2015    HX BREAST RECONSTRUCTION Bilateral 2/23/2016    BREAST RECONSTRUCTION performed by Austin Gutierrez MD at [address] HX BREAST RECONSTRUCTION Bilateral 5/23/2016    REMOVAL TISSUE EXPANDERS BILATERAL BREAST / RECONSTRUCTION BILATERAL BREASTS / GEL IMPLANTS  performed by Austin Gutierrez MD at [address] HX BREAST RECONSTRUCTION Bilateral 12/19/2016    REVISION BILATERAL BREAST RECONSTRUCTION WITH EXCISION OF EXCESS TISSUE / LATERAL BREAST BILATERAL NIPPLE RECONSTRUCTION WITH FTSG  performed by Austin Gutierrez MD at [facility] OR    HX GI      COLONOSCOPY    HX GI      ENDOSCOPY    HX GYN      uterine ablation    HX MASTECTOMY Bilateral 2/23/2016    BILATERAL BREAST MASTECTOMY, BILATERAL BREAST RECONSTRUCTION WITH TISSUE EXPANDERS AFTER MASTECTOMIES REMOVE PORT LEFT CHEST performed by Miryam Fernandez MD at [place] [address]    HX TONSILLECTOMY      HX TUBAL LIGATION      HX VASCULAR ACCESS      PORT-A-CATH    HX VASCULAR ACCESS      PORT-A-CATH REMOVAL      Family History   Problem Relation Age of Onset    Cancer Mother      colon cancer    Hypertension Mother     Kidney Disease Mother dialysis    Alcohol abuse Father     Stroke Sister     Cancer Brother      bone cancer    Anemia Daughter     Diabetes Brother     Heart Surgery Sister      CABG 3 WAY    Heart Attack Sister     No Known Problems Brother     Anesth Problems Son     Delayed Awakening Son      Social History   Substance Use Topics    Smoking status: Never Smoker    Smokeless tobacco: Never Used    Alcohol use No      Prior to Admission medications    Medication Sig Start Date End Date Taking? Authorizing Provider   esomeprazole (NEXIUM) 40 mg capsule  5/9/18  Yes Historical Provider   ACETAMINOPHEN (TYLENOL PO) Take  by mouth as needed. Yes Historical Provider   DENAVIR 1 % topical cream  10/30/17  Yes Historical Provider   aspirin delayed-release 81 mg tablet Take  by mouth daily. Historical Provider   NAPROXEN SODIUM (ALEVE PO) Take  by mouth as needed. Historical Provider          Allergies   Allergen Reactions    Lisinopril Rash and Angioedema    Pcn [Penicillins] Other (comments)     Hallucinations, confusion    Septra [Sulfamethoprim Ds] Rash         Objective:     Vitals:    06/01/18 0855   BP: 122/84   Pulse: 62   Resp: 16   Temp: 98.4 °F (36.9 °C)   TempSrc: Oral   SpO2: 96%   Weight: 162 lb 3.2 oz (73.6 kg)   Height: 5' 8\" (1.727 m)          Physical Exam:    GENERAL: alert, cooperative  EYE: negative  LYMPHATIC: Cervical, supraclavicular, and axillary nodes normal.   THROAT & NECK: normal and no erythema or exudates noted. LUNG: clear to auscultation bilaterally  HEART: regular rate and rhythm  ABDOMEN: soft, non-tender  EXTREMITIES: no cyanosis or edema  SKIN: negative  NEUROLOGIC: negative        Assessment:     1.  Right breast carcinoma:  T2 N0 Mx (Stage IIA) infiltrating ductal carcinoma, Tumor size 3.5 cm, LN -ve, grade 3, ER -ve, MA -ve, Her 2 -ve    ypT1c N0    ECOG PS 0  Intent of therapy - curative    LVEF 60-65% - repeated on 2/19/16 after completion of chemotherapy    Adriamycin / cytoxan - s/p 4 cycles  Taxol/Carboplatin, completed 9 weeks    Therapy stopped because of recurrent cytopenia     S/p bilateral mastectomy - (2/23/16) 1.8 cm of residual tumor   Asymptomatic  In remission  Survivorship visit complete        Plan:       > Observation  > Obtain Vit D level results from Rheum  > Follow up in 1 year      Signed by: Kinza Benton NP                     June 1, 2018        CCMD Cherry Scott MD

## 2018-06-01 NOTE — PROGRESS NOTES
Sharyn Carmona is a 66 y.o. female here today for right sided breast cancer f/u. Triple negative breast cancer. S/p chemo + mastectomies; 2016. VS stable. Patient denies pain. Good appetite. Patient denies N/V/D and constipation. Patient denies numbness and tingling. Patient denies mouth ulcers. Patient denies cough. Patient denies SOB. Patient denies falls. Patient state she has itching all over her body when she sweat and get in the shower. Visit Vitals    /84    Pulse 62    Temp 98.4 °F (36.9 °C) (Oral)    Resp 16    Ht 5' 8\" (1.727 m)    Wt 162 lb 3.2 oz (73.6 kg)    LMP 04/01/2015    SpO2 96%    BMI 24.66 kg/m2     Health Maintenance Review: Patient reminded of \"due or due soon\" health maintenance. I have asked the patient to contact his/her primary care provider (PCP) for follow-up on his/her health maintenance.

## 2018-06-18 ENCOUNTER — OFFICE VISIT (OUTPATIENT)
Dept: SURGERY | Age: 58
End: 2018-06-18

## 2018-06-18 ENCOUNTER — TELEPHONE (OUTPATIENT)
Dept: INTERNAL MEDICINE CLINIC | Age: 58
End: 2018-06-18

## 2018-06-18 VITALS
DIASTOLIC BLOOD PRESSURE: 87 MMHG | HEART RATE: 64 BPM | BODY MASS INDEX: 24.25 KG/M2 | SYSTOLIC BLOOD PRESSURE: 144 MMHG | HEIGHT: 68 IN | WEIGHT: 160 LBS

## 2018-06-18 DIAGNOSIS — Z90.13 S/P BILATERAL MASTECTOMY: ICD-10-CM

## 2018-06-18 DIAGNOSIS — Z17.1 MALIGNANT NEOPLASM OF UPPER-INNER QUADRANT OF RIGHT BREAST IN FEMALE, ESTROGEN RECEPTOR NEGATIVE (HCC): Primary | ICD-10-CM

## 2018-06-18 DIAGNOSIS — C50.211 MALIGNANT NEOPLASM OF UPPER-INNER QUADRANT OF RIGHT BREAST IN FEMALE, ESTROGEN RECEPTOR NEGATIVE (HCC): Primary | ICD-10-CM

## 2018-06-18 RX ORDER — RANITIDINE 150 MG/1
TABLET, FILM COATED ORAL
Refills: 0 | COMMUNITY
Start: 2018-04-02 | End: 2018-08-09 | Stop reason: ALTCHOICE

## 2018-06-18 NOTE — TELEPHONE ENCOUNTER
Spoke with patient. Two pt identifiers confirmed. Patient states that she would like to schedule an appointment with Dr. Delores Cardenas for right hip and low back pain. Patient states that she would like to come in on 06/29/18. Patient offered an appointment on 06/29/18 @ 10:30a. Patient accepted. Patient advised to call the office if anything changes or if she is unable to keep this appointment. Pt verbalized understanding of information discussed w/ no further questions at this time.

## 2018-06-18 NOTE — TELEPHONE ENCOUNTER
Patient states she needs a call back to get an kalpana t next week for Right Hip & Back pain. Also to be seen for stumping her toe that she is concerned is broken. Please call to discuss.  Thank you

## 2018-06-18 NOTE — PATIENT INSTRUCTIONS
Breast Self-Exam: Care Instructions  Your Care Instructions    A breast self-exam is when you check your breasts for lumps or changes. This regular exam helps you learn how your breasts normally look and feel. Most breast problems or changes are not because of cancer. Breast self-exam is not a substitute for a mammogram. Having regular breast exams by your doctor and regular mammograms improve your chances of finding any problems with your breasts. Some women set a time each month to do a step-by-step breast self-exam. Other women like a less formal system. They might look at their breasts as they brush their teeth, or feel their breasts once in a while in the shower. If you notice a change in your breast, tell your doctor. Follow-up care is a key part of your treatment and safety. Be sure to make and go to all appointments, and call your doctor if you are having problems. It's also a good idea to know your test results and keep a list of the medicines you take. How do you do a breast self-exam?  · The best time to examine your breasts is usually one week after your menstrual period begins. Your breasts should not be tender then. If you do not have periods, you might do your exam on a day of the month that is easy to remember. · To examine your breasts:  ¨ Remove all your clothes above the waist and lie down. When you are lying down, your breast tissue spreads evenly over your chest wall, which makes it easier to feel all your breast tissue. ¨ Use the pads-not the fingertips-of the 3 middle fingers of your left hand to check your right breast. Move your fingers slowly in small coin-sized circles that overlap. ¨ Use three levels of pressure to feel of all your breast tissue. Use light pressure to feel the tissue close to the skin surface. Use medium pressure to feel a little deeper. Use firm pressure to feel your tissue close to your breastbone and ribs.  Use each pressure level to feel your breast tissue before moving on to the next spot. ¨ Check your entire breast, moving up and down as if following a strip from the collarbone to the bra line, and from the armpit to the ribs. Repeat until you have covered the entire breast.  ¨ Repeat this procedure for your left breast, using the pads of the 3 middle fingers of your right hand. · To examine your breasts while in the shower:  ¨ Place one arm over your head and lightly soap your breast on that side. ¨ Using the pads of your fingers, gently move your hand over your breast (in the strip pattern described above), feeling carefully for any lumps or changes. ¨ Repeat for the other breast.  · Have your doctor inspect anything you notice to see if you need further testing. Where can you learn more? Go to http://blanka-nilson.info/. Enter P148 in the search box to learn more about \"Breast Self-Exam: Care Instructions. \"  Current as of: May 12, 2017  Content Version: 11.4  © 5506-4663 Healthwise, Incorporated. Care instructions adapted under license by [employer] (which disclaims liability or warranty for this information). If you have questions about a medical condition or this instruction, always ask your healthcare professional. [place] [address] any warranty or liability for your use of this information.

## 2018-06-29 ENCOUNTER — TELEPHONE (OUTPATIENT)
Dept: INTERNAL MEDICINE CLINIC | Age: 58
End: 2018-06-29

## 2018-06-29 ENCOUNTER — OFFICE VISIT (OUTPATIENT)
Dept: INTERNAL MEDICINE CLINIC | Age: 58
End: 2018-06-29

## 2018-06-29 VITALS
BODY MASS INDEX: 23.98 KG/M2 | DIASTOLIC BLOOD PRESSURE: 69 MMHG | HEART RATE: 62 BPM | OXYGEN SATURATION: 98 % | WEIGHT: 158.2 LBS | HEIGHT: 68 IN | SYSTOLIC BLOOD PRESSURE: 103 MMHG | RESPIRATION RATE: 18 BRPM | TEMPERATURE: 97.9 F

## 2018-06-29 DIAGNOSIS — M54.31 RIGHT SIDED SCIATICA: Primary | ICD-10-CM

## 2018-06-29 DIAGNOSIS — M54.41 ACUTE BILATERAL LOW BACK PAIN WITH RIGHT-SIDED SCIATICA: ICD-10-CM

## 2018-06-29 DIAGNOSIS — M54.31 BILATERAL SCIATICA: Primary | ICD-10-CM

## 2018-06-29 DIAGNOSIS — S90.122A CONTUSION OF FOURTH TOE OF LEFT FOOT, INITIAL ENCOUNTER: ICD-10-CM

## 2018-06-29 DIAGNOSIS — Z13.220 SCREENING FOR HYPERLIPIDEMIA: ICD-10-CM

## 2018-06-29 DIAGNOSIS — M54.32 BILATERAL SCIATICA: Primary | ICD-10-CM

## 2018-06-29 RX ORDER — DICLOFENAC SODIUM 75 MG/1
75 TABLET, DELAYED RELEASE ORAL 2 TIMES DAILY
Qty: 30 TAB | Refills: 1 | Status: SHIPPED | OUTPATIENT
Start: 2018-06-29 | End: 2019-04-25 | Stop reason: ALTCHOICE

## 2018-06-29 RX ORDER — SODIUM FLUORIDE/POTASSIUM NIT 1.1 %-5 %
PASTE (ML) DENTAL
Refills: 0 | COMMUNITY
Start: 2018-04-27 | End: 2021-10-29

## 2018-06-29 NOTE — PATIENT INSTRUCTIONS
Sacroiliac Pain: Exercises  Your Care Instructions  Here are some examples of typical rehabilitation exercises for your condition. Start each exercise slowly. Ease off the exercise if you start to have pain. Your doctor or physical therapist will tell you when you can start these exercises and which ones will work best for you. How to do the exercises  Knee-to-chest stretch    1. Do not do the knee-to-chest exercise if it causes or increases back or leg pain. 2. Lie on your back with your knees bent and your feet flat on the floor. You can put a small pillow under your head and neck if it is more comfortable. 3. Grasp your hands under one knee and bring the knee to your chest, keeping the other foot flat on the floor. 4. Keep your lower back pressed to the floor. Hold for at least 15 to 30 seconds. 5. Relax and lower the knee to the starting position. Repeat with the other leg. 6. Repeat 2 to 4 times with each leg. 7. To get more stretch, keep your other leg flat on the floor while pulling your knee to your chest.  Bridging    1. Lie on your back with both knees bent. Your knees should be bent about 90 degrees. 2. Tighten your belly muscles by pulling in your belly button toward your spine. Then push your feet into the floor, squeeze your buttocks, and lift your hips off the floor until your shoulders, hips, and knees are all in a straight line. 3. Hold for about 6 seconds as you continue to breathe normally, and then slowly lower your hips back down to the floor and rest for up to 10 seconds. 4. Repeat 8 to 12 times. Hip extension    1. Get down on your hands and knees on the floor. 2. Keeping your back and neck straight, lift one leg straight out behind you. When you lift your leg, keep your hips level. Don't let your back twist, and don't let your hip drop toward the floor. 3. Hold for 6 seconds. Repeat 8 to 12 times with each leg.   4. If you feel steady and strong when you do this exercise, you can make it more difficult. To do this, when you lift your leg, also lift the opposite arm straight out in front of you. For example, lift the left leg and the right arm at the same time. (This is sometimes called the \"bird dog exercise. \") Hold for 6 seconds, and repeat 8 to 12 times on each side. Clamshell    1. Lie on your side with a pillow under your head. Keep your feet and knees together and your knees bent. 2. Raise your top knee, but keep your feet together. Do not let your hips roll back. Your legs should open up like a clamshell. 3. Hold for 6 seconds. 4. Slowly lower your knee back down. Rest for 10 seconds. 5. Repeat 8 to 12 times. 6. Switch to your other side and repeat steps 1 through 5. Hamstring wall stretch    1. Lie on your back in a doorway, with one leg through the open door. 2. Slide your affected leg up the wall to straighten your knee. You should feel a gentle stretch down the back of your leg. 1. Do not arch your back. 2. Do not bend either knee. 3. Keep one heel touching the floor and the other heel touching the wall. Do not point your toes. 3. Hold the stretch for at least 1 minute to begin. Then try to lengthen the time you hold the stretch to as long as 6 minutes. 4. Switch legs, and repeat steps 1 through 3.  5. Repeat 2 to 4 times. 6. If you do not have a place to do this exercise in a doorway, there is another way to do it:  7. Lie on your back, and bend one knee. 8. Loop a towel under the ball and toes of that foot, and hold the ends of the towel in your hands. 9. Straighten your knee, and slowly pull back on the towel. You should feel a gentle stretch down the back of your leg. 10. Switch legs, and repeat steps 1 through 3.  11. Repeat 2 to 4 times. Lower abdominal strengthening    1. Lie on your back with your knees bent and your feet flat on the floor. 2. Tighten your belly muscles by pulling your belly button in toward your spine.   3. Lift one foot off the floor and bring your knee toward your chest, so that your knee is straight above your hip and your leg is bent like the letter \"L. \"  4. Lift the other knee up to the same position. 5. Lower one leg at a time to the starting position. 6. Keep alternating legs until you have lifted each leg 8 to 12 times. 7. Be sure to keep your belly muscles tight and your back still as you are moving your legs. Be sure to breathe normally. Piriformis stretch    1. Lie on your back with your legs straight. 2. Lift your affected leg, and bend your knee. With your opposite hand, reach across your body, and then gently pull your knee toward your opposite shoulder. 3. Hold the stretch for 15 to 30 seconds. 4. Switch legs and repeat steps 1 through 3.  5. Repeat 2 to 4 times. Follow-up care is a key part of your treatment and safety. Be sure to make and go to all appointments, and call your doctor if you are having problems. It's also a good idea to know your test results and keep a list of the medicines you take. Where can you learn more? Go to http://blanka-nilson.info/. Enter V776 in the search box to learn more about \"Sacroiliac Pain: Exercises. \"  Current as of: March 21, 2017  Content Version: 11.4  © 0388-6370 Healthwise, Incorporated. Care instructions adapted under license by [employer] (which disclaims liability or warranty for this information). If you have questions about a medical condition or this instruction, always ask your healthcare professional. [address] any warranty or liability for your use of this information.

## 2018-06-29 NOTE — MR AVS SNAPSHOT
[address] [facility] 
[phone] Patient: Ronda Vincent MRN:  [id number] Visit Information Date & Time Provider Department Dept. Phone Encounter #  
 6/29/2018 10:30 AM Joy Hanna, [address] [facility] [id number] Follow-up Instructions Return if symptoms worsen or fail to improve. Your Appointments 8/9/2018  8:30 AM  
Follow Up with BINTA Rubin 22 ([address]) Appt Note: 6 month follow up cp$40 2/8/18 TT  
 [address] [facility] [address] [place] [address] [place]  
  
    
 6/7/2019  9:00 AM  
ESTABLISHED PATIENT with Mimi Joyner MD  
2002 [facility] at [facility]) Appt Note: onc 1 yr f/u  
 [address] [facility] [address] [place] [address]  
[address] [place] [address] 600 [facility] [address] Kal Kat Upcoming Health Maintenance Date Due DTaP/Tdap/Td series (1 - Tdap) 3/3/1983 PAP AKA CERVICAL CYTOLOGY 9/1/2014 BREAST CANCER SCRN MAMMOGRAM 7/17/2017 Pneumococcal 19-64 Highest Risk (2 of 3 - PPSV23) 8/23/2017 Influenza Age 5 to Adult 8/1/2018 COLONOSCOPY 11/7/2021 Allergies as of 6/29/2018  Review Complete On: 6/29/2018 By: Joy Hanna MD  
  
 Severity Noted Reaction Type Reactions Lisinopril  06/11/2012   Side Effect Rash, Angioedema Pcn [Penicillins]  03/30/2010    Other (comments) Hallucinations, confusion Septra [Sulfamethoprim Ds]  06/29/2014    Rash Current Immunizations  Reviewed on 12/29/2017 Name Date Influenza Vaccine (Quad) PF 12/29/2017, 11/3/2016 Influenza Vaccine PF 11/21/2014 Pneumococcal Conjugate (PCV-13) 6/28/2017 Not reviewed this visit You Were Diagnosed With   
  
 Codes Comments Right sided sciatica    -  Primary ICD-10-CM: M54.31 
ICD-9-CM: 724.3 Contusion of fourth toe of left foot, initial encounter     ICD-10-CM: I68.406Y ICD-9-CM: 924.3 Acute bilateral low back pain with right-sided sciatica     ICD-10-CM: M54.41 
ICD-9-CM: 724.2, 724.3, 338.19 Screening for hyperlipidemia     ICD-10-CM: Z13.220 ICD-9-CM: V77.91 Vitals BP Pulse Temp Resp Height(growth percentile) Weight(growth percentile) 103/69 (BP 1 Location: Left arm, BP Patient Position: Sitting) 62 97.9 °F (36.6 °C) (Oral) 18 5' 8\" (1.727 m) 158 lb 3.2 oz (71.8 kg) LMP SpO2 BMI OB Status Smoking Status 04/01/2015 98% 24.05 kg/m2 Postmenopausal Never Smoker BMI and BSA Data Body Mass Index Body Surface Area 24.05 kg/m 2 1.86 m 2 Preferred Pharmacy [facility] Name Phone [id number] [address] [phone] Your Updated Medication List  
  
   
This list is accurate as of 6/29/18 11:15 AM.  Always use your most recent med list.  
  
  
  
  
 Flavio Blankenship Take  by mouth as needed. DENAVIR 1 % topical cream  
Generic drug:  penciclovir  
  
 diclofenac EC 75 mg EC tablet Commonly known as:  VOLTAREN Take 1 Tab by mouth two (2) times a day. As needed for pain PREVIDENT 5000 SENSITIVE 1.1-5 % Pste Generic drug:  sodium fluoride-pot nitrate  
  
 raNITIdine 150 mg tablet Commonly known as:  ZANTAC  
take 1 tablet by mouth twice a day TYLENOL PO Take  by mouth as needed. Prescriptions Sent to Pharmacy Refills  
 diclofenac EC (VOLTAREN) 75 mg EC tablet 1 Sig: Take 1 Tab by mouth two (2) times a day. As needed for pain  
 Class: Normal  
 Pharm[id number] [address] #: [phone] Route: Oral  
  
We Performed the Following LIPID PANEL [57345 CPT(R)] Follow-up Instructions Return if symptoms worsen or fail to improve. To-Do List   
 06/29/2018 Imaging:  XR 4TH TOE LT MIN 2 V   
  
 06/29/2018 Imaging:  XR SPINE LUMB 2 OR 3 V Patient Instructions Sacroiliac Pain: Exercises Your Care Instructions Here are some examples of typical rehabilitation exercises for your condition. Start each exercise slowly. Ease off the exercise if you start to have pain. Your doctor or physical therapist will tell you when you can start these exercises and which ones will work best for you. How to do the exercises Knee-to-chest stretch 1. Do not do the knee-to-chest exercise if it causes or increases back or leg pain. 2. Lie on your back with your knees bent and your feet flat on the floor. You can put a small pillow under your head and neck if it is more comfortable. 3. Grasp your hands under one knee and bring the knee to your chest, keeping the other foot flat on the floor. 4. Keep your lower back pressed to the floor. Hold for at least 15 to 30 seconds. 5. Relax and lower the knee to the starting position. Repeat with the other leg. 6. Repeat 2 to 4 times with each leg. 7. To get more stretch, keep your other leg flat on the floor while pulling your knee to your chest. 
Bridging 1. Lie on your back with both knees bent. Your knees should be bent about 90 degrees. 2. Tighten your belly muscles by pulling in your belly button toward your spine. Then push your feet into the floor, squeeze your buttocks, and lift your hips off the floor until your shoulders, hips, and knees are all in a straight line. 3. Hold for about 6 seconds as you continue to breathe normally, and then slowly lower your hips back down to the floor and rest for up to 10 seconds. 4. Repeat 8 to 12 times. Hip extension 1. Get down on your hands and knees on the floor.  
2. Keeping your back and neck straight, lift one leg straight out behind you. When you lift your leg, keep your hips level. Don't let your back twist, and don't let your hip drop toward the floor. 3. Hold for 6 seconds. Repeat 8 to 12 times with each leg. 4. If you feel steady and strong when you do this exercise, you can make it more difficult. To do this, when you lift your leg, also lift the opposite arm straight out in front of you. For example, lift the left leg and the right arm at the same time. (This is sometimes called the \"bird dog exercise. \") Hold for 6 seconds, and repeat 8 to 12 times on each side. Clamshell 1. Lie on your side with a pillow under your head. Keep your feet and knees together and your knees bent. 2. Raise your top knee, but keep your feet together. Do not let your hips roll back. Your legs should open up like a clamshell. 3. Hold for 6 seconds. 4. Slowly lower your knee back down. Rest for 10 seconds. 5. Repeat 8 to 12 times. 6. Switch to your other side and repeat steps 1 through 5. Hamstring wall stretch 1. Lie on your back in a doorway, with one leg through the open door. 2. Slide your affected leg up the wall to straighten your knee. You should feel a gentle stretch down the back of your leg. 1. Do not arch your back. 2. Do not bend either knee. 3. Keep one heel touching the floor and the other heel touching the wall. Do not point your toes. 3. Hold the stretch for at least 1 minute to begin. Then try to lengthen the time you hold the stretch to as long as 6 minutes. 4. Switch legs, and repeat steps 1 through 3. 
5. Repeat 2 to 4 times. 6. If you do not have a place to do this exercise in a doorway, there is another way to do it: 
7. Lie on your back, and bend one knee. 8. Loop a towel under the ball and toes of that foot, and hold the ends of the towel in your hands. 9. Straighten your knee, and slowly pull back on the towel. You should feel a gentle stretch down the back of your leg. 10. Switch legs, and repeat steps 1 through 3. 
11. Repeat 2 to 4 times. Lower abdominal strengthening 1. Lie on your back with your knees bent and your feet flat on the floor. 2. Tighten your belly muscles by pulling your belly button in toward your spine. 3. Lift one foot off the floor and bring your knee toward your chest, so that your knee is straight above your hip and your leg is bent like the letter \"L. \" 
4. Lift the other knee up to the same position. 5. Lower one leg at a time to the starting position. 6. Keep alternating legs until you have lifted each leg 8 to 12 times. 7. Be sure to keep your belly muscles tight and your back still as you are moving your legs. Be sure to breathe normally. Piriformis stretch 1. Lie on your back with your legs straight. 2. Lift your affected leg, and bend your knee. With your opposite hand, reach across your body, and then gently pull your knee toward your opposite shoulder. 3. Hold the stretch for 15 to 30 seconds. 4. Switch legs and repeat steps 1 through 3. 
5. Repeat 2 to 4 times. Follow-up care is a key part of your treatment and safety. Be sure to make and go to all appointments, and call your doctor if you are having problems. It's also a good idea to know your test results and keep a list of the medicines you take. Where can you learn more? Go to http://blanka-nilson.info/. Enter G020 in the search box to learn more about \"Sacroiliac Pain: Exercises. \" Current as of: March 21, 2017 Content Version: 11.4 © 1625-5380 Healthwise, Incorporated. Care instructions adapted under license by [employer] (which disclaims liability or warranty for this information). If you have questions about a medical condition or this instruction, always ask your healthcare professional. [address] any warranty or liability for your use of this information. Introducing [facility]! Dear Michaela Smoke: Thank you for requesting a imgix account. Our records indicate that you already have an active imgix account. You can access your account anytime at [email or web address] Did you know that you can access your hospital and ER discharge instructions at any time in imgix? You can also review all of your test results from your hospital stay or ER visit. Additional Information If you have questions, please visit the Frequently Asked Questions section of the imgix website at [email or web address] Remember, imgix is NOT to be used for urgent needs. For medical emergencies, dial 911. Now available from your iPhone and Android! Please provide this summary of care documentation to your next provider. Your primary care clinician is listed as Jimmie Powers. If you have any questions after today's visit, please call [phone].

## 2018-06-29 NOTE — PROGRESS NOTES
Reviewed record in preparation for visit and have obtained necessary documentation. Identified pt with two pt identifiers(name and ). Chief Complaint   Patient presents with    Toe Pain     left foot ring toe x 2 weeks    Numbness     left foot ring toe x 2 weeks    Buttocks pain     states right side 5/10 achy pain x 1 month       Health Maintenance Due   Topic Date Due    DTaP/Tdap/Td  (1 - Tdap) 1983    Cervical Cancer Screening  2014    Breast Cancer Screening  2017    Pneumococcal Vaccine (2 of 3 - PPSV23) 2017       Ms. Yosi Foreman has a reminder for a \"due or due soon\" health maintenance. I have asked that she discuss this further with her primary care provider for follow-up on this health maintenance. Coordination of Care Questionnaire:  :     1) Have you been to an emergency room, urgent care clinic since your last visit? no   Hospitalized since your last visit? no             2) Have you seen or consulted any other health care providers outside of [address] [place] [address] since your last visit? no  (Include any pap smears or colon screenings in this section.)    3) In the event something were to happen to you and you were unable to speak on your behalf, do you have an Advance Directive/ Living Will in place stating your wishes? NO    Do you have an Advance Directive on file? no    4) Are you interested in receiving information on Advance Directives? YES    Patient is accompanied by self I have received verbal consent from Mika Barker to discuss any/all medical information while they are present in the room.

## 2018-06-29 NOTE — TELEPHONE ENCOUNTER
Spoke with patient. Left 4th toe displaced fracture. Spine normal.     We will call on Monday, referral to Dr. Gilberto Fontenot.

## 2018-06-29 NOTE — PROGRESS NOTES
Coby Kinsey is a 66 y.o. female who presents with pain in the right buttocks \"aching pain\". Worse with sitting, better when up walking. The pain will travel to the lateral right thigh, \"numbness\". History of LBP in the past.  No recent back pain. More active in yard, around house, walking more. Onset 1 month. Taking aleve one as needed. Left 4th toe pain and numbness. Hit toe on dresser in the middle of the night, onset 2 weeks ago. Past Medical History:   Diagnosis Date    ARF (acute renal failure) ([facility] 3/12/11    as of 8/11/15 pt states has resolved and is not currently being folllowed by specialist    Autoimmune disease ([facility] 2016    SJOURN SYNDROM NEW DX & RA    Cancer ([facility] 7/2015    triple (-) breast ca    Chest pain     Per Pt on Rt side and arm pit only, resolves with Tylenol and states she believes it is related to her tumor    Colon polyp     Dizziness     pt states it is vertigo    Echocardiogram normal 2/19/16    Family hx of colon cancer     History of chemotherapy     AUG 2015- JAN 2016    Ill-defined condition     HAD CHEMO LAST JAN 2016    Ill-defined condition     rhematoid arthritis,sjogren syndrome    Nephrotic syndrome 3/12/2011    biopsy inconclusive but steroid responsive    TMJ (temporomandibular joint disorder)     Toothache     being treated for tooth ache x 1 week.  pain better with treatment       Family History   Problem Relation Age of Onset   [facility] Mother      colon cancer    Hypertension Mother     Kidney Disease Mother      dialysis    Alcohol abuse Father     Stroke Sister     Cancer Brother      bone cancer    Anemia Daughter     Diabetes Brother     Heart Surgery Sister      CABG 3 WAY    Heart Attack Sister     No Known Problems Brother     Anesth Problems Son     Delayed Awakening Son        Social History     Social History    Marital status:      Spouse name: N/A    Number of children: N/A    Years of education: N/A Occupational History    Not on file. Social History Main Topics    Smoking status: Never Smoker    Smokeless tobacco: Never Used    Alcohol use No    Drug use: No    Sexual activity: Yes     Partners: Male     Birth control/ protection: None     Other Topics Concern    Not on file     Social History Narrative       Current Outpatient Prescriptions on File Prior to Visit   Medication Sig Dispense Refill    raNITIdine (ZANTAC) 150 mg tablet take 1 tablet by mouth twice a day  0    NAPROXEN SODIUM (ALEVE PO) Take  by mouth as needed.  ACETAMINOPHEN (TYLENOL PO) Take  by mouth as needed.  DENAVIR 1 % topical cream        No current facility-administered medications on file prior to visit. Review of Systems  Pertinent items are noted in HPI. Objective:     Visit Vitals    /69 (BP 1 Location: Left arm, BP Patient Position: Sitting)    Pulse 62    Temp 97.9 °F (36.6 °C) (Oral)    Resp 18    Ht 5' 8\" (1.727 m)    Wt 158 lb 3.2 oz (71.8 kg)    LMP 04/01/2015    SpO2 98%    BMI 24.05 kg/m2     Gen: well appearing female  Resp:  No wheezing, no rhonchi, no rales. CV:  RRR, normal S1S2, no murmur. GI: soft, nontender, without masses. No hepatosplenomegaly. Extrem:  +2 pulses, no edema, warm distally, swelling and pain left 4th toe. Back- paraspinous muscle pain on palpation, no vertebral pain, able to move on exam table without difficulty  Neuro- alert, normal gait, negative SLR, 5/5 motor in lower extremities, normal sensory      Assessment/Plan:       ICD-10-CM ICD-9-CM    1. Right sided sciatica M54.31 724.3 XR SPINE LUMB 2 OR 3 V      diclofenac EC (VOLTAREN) 75 mg EC tablet   2. Contusion of fourth toe of left foot, initial encounter S90.122A 924.3 XR 4TH TOE LT MIN 2 V      diclofenac EC (VOLTAREN) 75 mg EC tablet   3.  Acute bilateral low back pain with right-sided sciatica M54.41 724.2 XR SPINE LUMB 2 OR 3 V     724.3 diclofenac EC (VOLTAREN) 75 mg EC tablet 338. 19    4. Screening for hyperlipidemia Z13.220 V77.91 LIPID PANEL   given stretches. Follow-up Disposition:  Return if symptoms worsen or fail to improve.     Chad Raygoza MD

## 2018-06-30 LAB
CHOLEST SERPL-MCNC: 176 MG/DL (ref 100–199)
HDLC SERPL-MCNC: 43 MG/DL
LDLC SERPL CALC-MCNC: 120 MG/DL (ref 0–99)
TRIGL SERPL-MCNC: 63 MG/DL (ref 0–149)
VLDLC SERPL CALC-MCNC: 13 MG/DL (ref 5–40)

## 2018-07-02 NOTE — TELEPHONE ENCOUNTER
Please contact Dr. Nisha Leyva office to see if they can get her in soon. The patient has a fractured 4th toe. The fracture is displaced. Patient is aware. Thanks.

## 2018-07-02 NOTE — TELEPHONE ENCOUNTER
Patient scheduled to see Dr. Casey Dickinson 07/03/18 @ 10:50am. Patient to arrive @ 10:30am.  Patient notified of her appointment date and time. Pt verbalized understanding of information discussed w/ no further questions at this time.

## 2018-07-04 NOTE — PROGRESS NOTES
Notify patient her cholesterol is very good. , perfect is less than 100. Follow low fat diet and exercise. Annual recheck.

## 2018-08-09 ENCOUNTER — OFFICE VISIT (OUTPATIENT)
Dept: SURGERY | Age: 58
End: 2018-08-09

## 2018-08-09 VITALS
HEART RATE: 67 BPM | WEIGHT: 160 LBS | DIASTOLIC BLOOD PRESSURE: 67 MMHG | SYSTOLIC BLOOD PRESSURE: 107 MMHG | BODY MASS INDEX: 24.25 KG/M2 | HEIGHT: 68 IN

## 2018-08-09 DIAGNOSIS — C50.211 MALIGNANT NEOPLASM OF UPPER-INNER QUADRANT OF RIGHT BREAST IN FEMALE, ESTROGEN RECEPTOR NEGATIVE (HCC): Primary | ICD-10-CM

## 2018-08-09 DIAGNOSIS — Z17.1 MALIGNANT NEOPLASM OF UPPER-INNER QUADRANT OF RIGHT BREAST IN FEMALE, ESTROGEN RECEPTOR NEGATIVE (HCC): Primary | ICD-10-CM

## 2018-08-09 DIAGNOSIS — Z90.13 S/P BILATERAL MASTECTOMY: ICD-10-CM

## 2018-08-09 RX ORDER — ESOMEPRAZOLE MAGNESIUM 40 MG/1
CAPSULE, DELAYED RELEASE ORAL
Refills: 1 | COMMUNITY
Start: 2018-07-09 | End: 2019-10-24 | Stop reason: ALTCHOICE

## 2018-08-09 NOTE — PATIENT INSTRUCTIONS
Breast Self-Exam: Care Instructions  Your Care Instructions    A breast self-exam is when you check your breasts for lumps or changes. This regular exam helps you learn how your breasts normally look and feel. Most breast problems or changes are not because of cancer. Breast self-exam is not a substitute for a mammogram. Having regular breast exams by your doctor and regular mammograms improve your chances of finding any problems with your breasts. Some women set a time each month to do a step-by-step breast self-exam. Other women like a less formal system. They might look at their breasts as they brush their teeth, or feel their breasts once in a while in the shower. If you notice a change in your breast, tell your doctor. Follow-up care is a key part of your treatment and safety. Be sure to make and go to all appointments, and call your doctor if you are having problems. It's also a good idea to know your test results and keep a list of the medicines you take. How do you do a breast self-exam?  · The best time to examine your breasts is usually one week after your menstrual period begins. Your breasts should not be tender then. If you do not have periods, you might do your exam on a day of the month that is easy to remember. · To examine your breasts:  ¨ Remove all your clothes above the waist and lie down. When you are lying down, your breast tissue spreads evenly over your chest wall, which makes it easier to feel all your breast tissue. ¨ Use the pads-not the fingertips-of the 3 middle fingers of your left hand to check your right breast. Move your fingers slowly in small coin-sized circles that overlap. ¨ Use three levels of pressure to feel of all your breast tissue. Use light pressure to feel the tissue close to the skin surface. Use medium pressure to feel a little deeper. Use firm pressure to feel your tissue close to your breastbone and ribs.  Use each pressure level to feel your breast tissue before moving on to the next spot. ¨ Check your entire breast, moving up and down as if following a strip from the collarbone to the bra line, and from the armpit to the ribs. Repeat until you have covered the entire breast.  ¨ Repeat this procedure for your left breast, using the pads of the 3 middle fingers of your right hand. · To examine your breasts while in the shower:  ¨ Place one arm over your head and lightly soap your breast on that side. ¨ Using the pads of your fingers, gently move your hand over your breast (in the strip pattern described above), feeling carefully for any lumps or changes. ¨ Repeat for the other breast.  · Have your doctor inspect anything you notice to see if you need further testing. Where can you learn more? Go to http://blanka-nilson.info/. Enter P148 in the search box to learn more about \"Breast Self-Exam: Care Instructions. \"  Current as of: May 12, 2017  Content Version: 11.7  © 0139-3521 [employer]. Care instructions adapted under license by [employer] (which disclaims liability or warranty for this information). If you have questions about a medical condition or this instruction, always ask your healthcare professional. [place] [address] any warranty or liability for your use of this information.

## 2018-08-09 NOTE — PROGRESS NOTES
HISTORY OF PRESENT ILLNESS  Susan Bowden is a 66 y.o. female. HPI ESTABLISHED patient here for  Follow up RIGHT breast cancer. Not having any problems at this time. She saw Dr. Catalino Herrera in June for LEFT breast pain which has resolved.      History of breast cancer-  RIGHT breast IDC T2N0 triple negative  Completed neoadjuvant chemotherapy. Followed by Dr. Sendy Sher (originally 3.5 cm, 1.8 cm post neoadjuvant chemo)  2/23/16- BILATERAL mastectomy with reconstruction, RIGHT SLNB and port removal  .   No radiation. 5/2016- BILATERAL implants placed by Dr. Javon Hollingsworth. 12/2016- BILATERAL revision breast reconstruction.       No family history of breast or ovarian cancer.    Patient has VUS BRCA 1 ([place]).     ROS    Physical Exam    ASSESSMENT and PLAN  {ASSESSMENT/PLAN:49491}

## 2018-08-09 NOTE — PROGRESS NOTES
HISTORY OF PRESENT ILLNESS  Jesu Lin is a 66 y.o. female. Breast Cancer     ESTABLISHED patient here for  Follow up RIGHT breast cancer. Not having any problems at this time. She saw Dr. Susannah Peterson in June for LEFT breast pain which has resolved.      History of breast cancer-  RIGHT breast IDC T2N0 triple negative  Completed neoadjuvant chemotherapy. Followed by Dr. Dottie Frankel (originally 3.5 cm, 1.8 cm post neoadjuvant chemo)  2/23/16- BILATERAL mastectomy with reconstruction, RIGHT SLNB and port removal  .   No radiation. 5/2016- BILATERAL implants placed by Dr. Suad Stephens. 12/2016- BILATERAL revision breast reconstruction. OB History     Obstetric Comments    Menarche:  8. LMP: 4/8/15. # of Children:  3. Age at Delivery of First Child:  14.   Hysterectomy/oophorectomy:  NO/NO. Breast Bx:  yes. Hx of Breast Feeding:  no. BCP:  yes.  Hormone therapy:  no.               Past Surgical History:   Procedure Laterality Date    BREAST SURGERY PROCEDURE UNLISTED Right 8/13/15    RIGHT axillary SLNB and port a cath insertion    ENDOSCOPY, COLON, DIAGNOSTIC  12/12/11    Dr. Matthew Lux HX BREAST BIOPSY Right 7/2015    HX BREAST RECONSTRUCTION Bilateral 2/23/2016    BREAST RECONSTRUCTION performed by Melissa Malagon MD at [address] [place], [place] [address] Bilateral 5/23/2016    REMOVAL TISSUE EXPANDERS BILATERAL BREAST / RECONSTRUCTION BILATERAL BREASTS / GEL IMPLANTS  performed by Melissa Malagon MD at [facility] HX BREAST RECONSTRUCTION Bilateral 12/19/2016    REVISION BILATERAL BREAST RECONSTRUCTION WITH EXCISION OF EXCESS TISSUE / LATERAL BREAST BILATERAL NIPPLE RECONSTRUCTION WITH FTSG  performed by Melissa Malagon MD at [address] [place] [address] HX GI      COLONOSCOPY    HX GI      ENDOSCOPY    HX GYN      uterine ablation    HX MASTECTOMY Bilateral 2/23/2016    BILATERAL BREAST MASTECTOMY, BILATERAL BREAST RECONSTRUCTION WITH TISSUE EXPANDERS AFTER MASTECTOMIES REMOVE PORT LEFT CHEST performed by Alanna Tomas MD at [facility]    HX TONSILLECTOMY      HX TUBAL LIGATION      HX VASCULAR ACCESS      PORT-A-CATH    HX VASCULAR ACCESS      PORT-A-CATH REMOVAL       No family history of breast or ovarian cancer. Patient has VUS BRCA 1 ([place]).     ROS    Physical Exam   Constitutional: She appears well-developed and well-nourished. Pulmonary/Chest:       Musculoskeletal: Normal range of motion. UE x 2   Lymphadenopathy:     She has no cervical adenopathy. She has no axillary adenopathy. Right: No supraclavicular adenopathy present. Left: No supraclavicular adenopathy present. Skin: Skin is warm, dry and intact. Chest and breasts examined   Psychiatric: She has a normal mood and affect. Her speech is normal and behavior is normal.     Visit Vitals    /67    Pulse 67    Ht 5' 8\" (1.727 m)    Wt 160 lb (72.6 kg)    LMP 04/01/2015    BMI 24.33 kg/m2     ASSESSMENT and PLAN  Encounter Diagnoses   Name Primary?  Malignant neoplasm of upper-inner quadrant of right breast in female, estrogen receptor negative ([facility] [address] Yes    S/P bilateral mastectomy      Normal exam with no evidence of local recurrence. She would like to continue to be seen approximately every 6 months. Will RTC in 4/2019. She is comfortable with this plan. All questions answered and she stated understanding.

## 2019-04-01 ENCOUNTER — HOSPITAL ENCOUNTER (OUTPATIENT)
Dept: ULTRASOUND IMAGING | Age: 59
Discharge: HOME | End: 2019-04-01
Attending: SURGERY
Payer: COMMERCIAL

## 2019-04-01 DIAGNOSIS — C50.911 BREAST CANCER, RIGHT BREAST (HCC): ICD-10-CM

## 2019-04-01 DIAGNOSIS — N64.4 BREAST PAIN: ICD-10-CM

## 2019-04-01 DIAGNOSIS — Z90.13 HX OF BILATERAL MASTECTOMY: ICD-10-CM

## 2019-04-01 PROCEDURE — 76642 ULTRASOUND BREAST LIMITED: CPT

## 2019-04-24 NOTE — PROGRESS NOTES
HISTORY OF PRESENT ILLNESS  Mika Mj is a 64 y.o. female. HPI ESTABLISHED patient here for follow up RIGHT breast cancer. No RIGHT breast complaints. Continues to have occasional pain in LEFT reconstructed breast. This pain is on the lateral part of the breast and is intermittent. She describes it as aching or \"feeling heavy. \"  It resolves spontaneously and she cannot recall any specific precipitating factors. Dr. Tarun Zhang suggested a breast MRI, however the patient's insurance was not going to cover it. Instead, the patient had a LEFT breast US performed on 4/1/19. See result below. Follows up with Dr. Tarun Zhang again in May. The patient just started Prolia injections this week. Will receive every 6 weeks.      History of breast cancer-  RIGHT breast IDC T2N0 triple negative  Completed neoadjuvant chemotherapy. Followed by Dr. Germain Conroy (originally 3.5 cm, 1.8 cm post neoadjuvant chemo)  2/23/16- BILATERAL mastectomy with reconstruction, RIGHT SLNB and port removal  .   No radiation. 5/2016- BILATERAL implants placed by Dr. Tarun Zhang. 12/2016- BILATERAL revision breast reconstruction. OB History    None      Obstetric Comments   Menarche:  8. LMP: 4/8/15. # of Children:  3. Age at Delivery of First Child:  14.   Hysterectomy/oophorectomy:  NO/NO. Breast Bx:  yes. Hx of Breast Feeding:  no. BCP:  yes.  Hormone therapy:  no.                  Past Surgical History:   Procedure Laterality Date    BREAST SURGERY PROCEDURE UNLISTED Right 8/13/15    RIGHT axillary SLNB and port a cath insertion    ENDOSCOPY, COLON, DIAGNOSTIC  12/12/11    Dr. Marcus Rodriguez Right 7/2015    HX BREAST RECONSTRUCTION Bilateral 2/23/2016    BREAST RECONSTRUCTION performed by Edwige Jackson MD at [address] [place] [address] Bilateral 5/23/2016    REMOVAL TISSUE EXPANDERS BILATERAL BREAST / RECONSTRUCTION BILATERAL BREASTS / GEL IMPLANTS  performed by Edwige Jackson MD at [facility]    HX BREAST RECONSTRUCTION Bilateral 12/19/2016    REVISION BILATERAL BREAST RECONSTRUCTION WITH EXCISION OF EXCESS TISSUE / LATERAL BREAST BILATERAL NIPPLE RECONSTRUCTION WITH FTSG  performed by Alina Moran MD at [address] [place] [address] HX GI      COLONOSCOPY    HX GI      ENDOSCOPY    HX GYN      uterine ablation    HX MASTECTOMY Bilateral 2/23/2016    BILATERAL BREAST MASTECTOMY, BILATERAL BREAST RECONSTRUCTION WITH TISSUE EXPANDERS AFTER MASTECTOMIES REMOVE PORT LEFT CHEST performed by Azul Gibson MD at [place] [address]    HX TONSILLECTOMY      HX TUBAL LIGATION      HX VASCULAR ACCESS      PORT-A-CATH    HX VASCULAR ACCESS      PORT-A-CATH REMOVAL     No family history of breast or ovarian cancer. Patient has VUS BRCA 1 ([place]). Breast imaging-  US Results (most recent):  Results from Hospital Encounter encounter on 04/01/19   US BREAST LT LIMITED=<3 QUAD    Narrative EXAM: US BREAST LT LIMITED=<3 QUAD    INDICATION:   Hx of bilateral mastectomy with focal left-sided pain, breast pain  breast cancer    COMPARISON:  None. TECHNIQUE: High-resolution hand-held grayscale and selected color flow  evaluation of the area of clinical concern in the lateral left breast is  performed. Real-time evaluation performed by the radiologist.    FINDINGS:  The exam demonstrates an expected appearance to the visualized  margins of the left breast implant, subcutaneous fat tissues, and underlying  ribs and muscular tissue. No identified mass or other abnormality. Impression IMPRESSION:  No discrete mass is identified. RECOMMENDATION: Clinical evaluation of left breast pain. ROS    Physical Exam   Constitutional: She appears well-developed and well-nourished. Pulmonary/Chest:       Musculoskeletal: Normal range of motion. UE x 2   Lymphadenopathy:     She has no axillary adenopathy. Right: No supraclavicular adenopathy present.         Left: No supraclavicular adenopathy present. Skin: Skin is warm, dry and intact. Chest and breasts examined   Psychiatric: She has a normal mood and affect. Her speech is normal and behavior is normal.     Visit Vitals  /74   Pulse 62   Ht 5' 8\" (1.727 m)   Wt 160 lb (72.6 kg)   LMP 04/01/2015   BMI 24.33 kg/m²     ASSESSMENT and PLAN  Encounter Diagnoses   Name Primary?  Malignant neoplasm of upper-inner quadrant of right breast in female, estrogen receptor negative ([facility] [address] Yes    S/P bilateral mastectomy      Normal exam and left breast US in this patient s/p bilateral mastectomy for right breast cancer. No signs of right breast cancer recurrence or left breast malignancy. Discussed s/sx of recurrence and possible causes of left breast discomfort including scar tissue, nerve regeneration and mild lymphedema to the breast.  Her insurance would not cover a breast MRI. Discussed additional imaging if exam becomes abnormal or for evaluation of implant integrity PRN. She will plan to RTC here in 6 months or sooner PRN. Hoping to go on a cruise to the [facility] with a friend this summer. She is comfortable with this plan. All questions answered and she stated understanding.

## 2019-04-25 ENCOUNTER — OFFICE VISIT (OUTPATIENT)
Dept: SURGERY | Age: 59
End: 2019-04-25

## 2019-04-25 VITALS
HEIGHT: 68 IN | WEIGHT: 160 LBS | SYSTOLIC BLOOD PRESSURE: 131 MMHG | DIASTOLIC BLOOD PRESSURE: 74 MMHG | HEART RATE: 62 BPM | BODY MASS INDEX: 24.25 KG/M2

## 2019-04-25 DIAGNOSIS — Z90.13 S/P BILATERAL MASTECTOMY: ICD-10-CM

## 2019-04-25 DIAGNOSIS — Z17.1 MALIGNANT NEOPLASM OF UPPER-INNER QUADRANT OF RIGHT BREAST IN FEMALE, ESTROGEN RECEPTOR NEGATIVE (HCC): Primary | ICD-10-CM

## 2019-04-25 DIAGNOSIS — C50.211 MALIGNANT NEOPLASM OF UPPER-INNER QUADRANT OF RIGHT BREAST IN FEMALE, ESTROGEN RECEPTOR NEGATIVE (HCC): Primary | ICD-10-CM

## 2019-04-25 RX ORDER — LORATADINE 10 MG/1
10 TABLET ORAL
COMMUNITY
End: 2022-03-18

## 2019-04-25 NOTE — PATIENT INSTRUCTIONS

## 2019-05-28 ENCOUNTER — TELEPHONE (OUTPATIENT)
Dept: INTERNAL MEDICINE CLINIC | Age: 59
End: 2019-05-28

## 2019-05-28 NOTE — TELEPHONE ENCOUNTER
Spoke with patient. Two pt identifiers confirmed. Patient offered an appointment with Dr. Rehana Mccoy on 05/31/19. Appointment accepted. Pt verbalized understanding of information discussed w/ no further questions at this time.    Patient advised if anything changes or if unable to keep this appointment to call the office

## 2019-05-28 NOTE — TELEPHONE ENCOUNTER
----- Message from Santino Benitez sent at 5/28/2019  9:40 AM EDT -----  Regarding: / Telephone   Pt is returning call to Barbara Carver nurse regarding a appointment with Dr. Devan Adams. Best contact number is [phone].          Copy/paste envera

## 2019-05-31 ENCOUNTER — OFFICE VISIT (OUTPATIENT)
Dept: INTERNAL MEDICINE CLINIC | Age: 59
End: 2019-05-31

## 2019-05-31 VITALS
BODY MASS INDEX: 24.58 KG/M2 | SYSTOLIC BLOOD PRESSURE: 117 MMHG | RESPIRATION RATE: 16 BRPM | TEMPERATURE: 98.1 F | HEIGHT: 68 IN | DIASTOLIC BLOOD PRESSURE: 73 MMHG | WEIGHT: 162.2 LBS | OXYGEN SATURATION: 97 % | HEART RATE: 53 BPM

## 2019-05-31 DIAGNOSIS — Z13.220 SCREENING FOR HYPERLIPIDEMIA: ICD-10-CM

## 2019-05-31 DIAGNOSIS — M54.2 NECK PAIN: Primary | ICD-10-CM

## 2019-05-31 DIAGNOSIS — M35.00 SJOGREN'S SYNDROME, WITH UNSPECIFIED ORGAN INVOLVEMENT (HCC): ICD-10-CM

## 2019-05-31 NOTE — PATIENT INSTRUCTIONS
Office Policies    Phone calls/patient messages:            Please allow up to 24 hours for someone in the office to contact you about your call or message. Be mindful your provider may be out of the office or your message may require further review. We encourage you to use [employer] for your messages as this is a faster, more efficient way to communicate with our office                         Medication Refills:            Prescription medications require 48-72 business hours to process. We encourage you to use [employer] for your refills. For controlled medications: Please allow 72 business hours to process. Certain medications may require you to  a written prescription at our office. NO narcotic/controlled medications will be prescribed after 4pm Monday through Friday or on weekends              Form/Paperwork Completion:            Please note a $25 fee may incur for all paperwork for completed by our providers. We ask that you allow 7-10 business days. Pre-payment is due prior to picking up/faxing the completed form. You may also download your forms to [employer] to have your doctor print off. Neck Spasm: Exercises  Your Care Instructions  Here are some examples of typical rehabilitation exercises for your condition. Start each exercise slowly. Ease off the exercise if you start to have pain. Your doctor or physical therapist will tell you when you can start these exercises and which ones will work best for you. How to do the exercises  Levator scapula stretch    1. Sit in a firm chair, or stand up straight. 2. Gently tilt your head toward your left shoulder. 3. Turn your head to look down into your armpit, bending your head slightly forward. Let the weight of your head stretch your neck muscles. 4. Hold for 15 to 30 seconds. 5. Return to your starting position. 6. Follow the same instructions above, but tilt your head toward your right shoulder.   7. Repeat 2 to 4 times toward each shoulder. Upper trapezius stretch    1. Sit in a firm chair, or stand up straight. 2. This stretch works best if you keep your shoulder down as you lean away from it. To help you remember to do this, start by relaxing your shoulders and lightly holding on to your thighs or your chair. 3. Tilt your head toward your shoulder and hold for 15 to 30 seconds. Let the weight of your head stretch your muscles. 4. If you would like a little added stretch, place your arm behind your back. Use the arm opposite of the direction you are tilting your head. For example, if you are tilting your head to the left, place your right arm behind your back. 5. Repeat 2 to 4 times toward each shoulder. Neck rotation    1. Sit in a firm chair, or stand up straight. 2. Keeping your chin level, turn your head to the right, and hold for 15 to 30 seconds. 3. Turn your head to the left, and hold for 15 to 30 seconds. 4. Repeat 2 to 4 times to each side. Chin tuck    1. Lie on the floor with a rolled-up towel under your neck. Your head should be touching the floor. 2. Slowly bring your chin toward the front of your neck. 3. Hold for a count of 6, and then relax for up to 10 seconds. 4. Repeat 8 to 12 times. Forward neck flexion    1. Sit in a firm chair, or stand up straight. 2. Bend your head forward. 3. Hold for 15 to 30 seconds, then return to your starting position. 4. Repeat 2 to 4 times. Follow-up care is a key part of your treatment and safety. Be sure to make and go to all appointments, and call your doctor if you are having problems. It's also a good idea to know your test results and keep a list of the medicines you take. Where can you learn more? Go to http://blanka-nilson.info/. Enter P962 in the search box to learn more about \"Neck Spasm: Exercises. \"  Current as of: September 20, 2018  Content Version: 11.9  © 4916-0054 [employer].  Care instructions adapted under license by [employer] (which disclaims liability or warranty for this information). If you have questions about a medical condition or this instruction, always ask your healthcare professional. [address] any warranty or liability for your use of this information. USE HEAT, STRETCH, INCREASE ADVIL TO 2 TABLETS (400MG) AS NEEDED.

## 2019-06-07 ENCOUNTER — OFFICE VISIT (OUTPATIENT)
Dept: ONCOLOGY | Age: 59
End: 2019-06-07

## 2019-06-07 VITALS
WEIGHT: 161 LBS | DIASTOLIC BLOOD PRESSURE: 69 MMHG | RESPIRATION RATE: 18 BRPM | HEIGHT: 68 IN | TEMPERATURE: 97.7 F | BODY MASS INDEX: 24.4 KG/M2 | SYSTOLIC BLOOD PRESSURE: 110 MMHG | OXYGEN SATURATION: 97 % | HEART RATE: 61 BPM

## 2019-06-07 DIAGNOSIS — Z85.3 HISTORY OF BREAST CANCER: Primary | ICD-10-CM

## 2019-06-07 NOTE — PROGRESS NOTES
Follow up Note        Patient: Jesu Lin MRN: [id number]  SSN: [id number]    YOB: 1960  Age: 64 y.o. Sex: female        Diagnosis:     1. Right breast carcinoma:  T2 N0 Mx (Stage IIA) infiltrating ductal carcinoma, Tumor size 3.5 cm, LN -ve, grade 3, ER -ve, PA -ve, Her 2 -ve      Treatment:     1. Neoadjuvant chemotherapy   S/p Taxol, Carboplatin - completed 9 weeks on     Therapy stopped due to significant cytopenias (11/25/16)   Adriamycin, Cytoxan -  Cycle 4 completed 1/27/16    2. Bilateral mastectomy with reconstruction by tissue expanders 2/23/16          Subjective:      Jesu Lin is a 64 y.o. female with right sided breast cancer. She felt a lump in her breast, underwent a mammogram, was noted to have a 3.5 cm mass in the medial upper part of the breast. A biopsy of the mass confirmed a nuclear grade III, ER -ve, PA -ve, Her 2 -ve. [place] LN biopsy shows no evidence of disease in the LNs. She completed neoadjuvant chemotherapy and then underwent bilateral mastectomies. She had reconstructive surgery by Dr. Gonzalez Homes 12/19/2017. Ms. Bong Salazar feels well with no complaints. She denies headaches, new bone pains, or weight loss. She continues to follow with rheumatology for her Sjogrens syndrome.       Review of Systems:    Constitutional: negative  Eyes: negative  Ears, Nose, Mouth, Throat, and Face: negative  Respiratory: negative  Cardiovascular: negative  Gastrointestinal: nausea  Hematologic/Lymphatic: negative  Skin: negative  Musculoskeletal: arthralgias  Neurological: negative      Past Medical History:   Diagnosis Date    ARF (acute renal failure) ([facility] [address] 3/12/11    as of 8/11/15 pt states has resolved and is not currently being folllowed by specialist    Autoimmune disease ([facility] [address] 2016    SJOURN SYNDROM NEW DX & RA    Cancer ([facility] [address] 7/2015    triple (-) breast ca    Chest pain     Per Pt on Rt side and arm pit only, resolves with Tylenol and states she believes it is related to her tumor    Colon polyp     Dizziness     pt states it is vertigo    Echocardiogram normal 2/19/16    Family hx of colon cancer     History of chemotherapy     AUG 2015- JAN 2016    Ill-defined condition     HAD CHEMO LAST JAN 2016    Ill-defined condition     rhematoid arthritis,sjogren syndrome    Nephrotic syndrome 3/12/2011    biopsy inconclusive but steroid responsive    TMJ (temporomandibular joint disorder)     Toothache     being treated for tooth ache x 1 week.  pain better with treatment     Past Surgical History:   Procedure Laterality Date    BREAST SURGERY PROCEDURE UNLISTED Right 8/13/15    RIGHT axillary SLNB and port a cath insertion    ENDOSCOPY, COLON, DIAGNOSTIC  12/12/11    Dr. Jocelyn Garcia    HX BREAST BIOPSY Right 7/2015    HX BREAST RECONSTRUCTION Bilateral 2/23/2016    BREAST RECONSTRUCTION performed by Jaquan Nicolas MD at [address] [place] [address] HX BREAST RECONSTRUCTION Bilateral 5/23/2016    REMOVAL TISSUE EXPANDERS BILATERAL BREAST / RECONSTRUCTION BILATERAL BREASTS / GEL IMPLANTS  performed by Jaquan Nicolas MD at [address] [place] [address] HX BREAST RECONSTRUCTION Bilateral 12/19/2016    REVISION BILATERAL BREAST RECONSTRUCTION WITH EXCISION OF EXCESS TISSUE / LATERAL BREAST BILATERAL NIPPLE RECONSTRUCTION WITH FTSG  performed by Jaquan Nicolas MD at [address] [place] [address] HX GI      COLONOSCOPY    HX GI      ENDOSCOPY    HX GYN      uterine ablation    HX MASTECTOMY Bilateral 2/23/2016    BILATERAL BREAST MASTECTOMY, BILATERAL BREAST RECONSTRUCTION WITH TISSUE EXPANDERS AFTER MASTECTOMIES REMOVE PORT LEFT CHEST performed by Jacey Morfin MD at [place] [address]    HX TONSILLECTOMY      HX TUBAL LIGATION      HX VASCULAR ACCESS      PORT-A-CATH    HX VASCULAR ACCESS      PORT-A-CATH REMOVAL      Family History   Problem Relation Age of Onset    Cancer Mother         colon cancer    Hypertension Mother     Kidney Disease Mother         dialysis   [address] [place] [address] Alcohol abuse Father     Stroke Sister     Cancer Brother         bone cancer    Anemia Daughter     Diabetes Brother     Heart Surgery Sister         CABG [address]    Heart Attack Sister     No Known Problems Brother     Anesth Problems Son     Delayed Awakening Son      Social History     Tobacco Use    Smoking status: Never Smoker    Smokeless tobacco: Never Used   Substance Use Topics    Alcohol use: No      Prior to Admission medications    Medication Sig Start Date End Date Taking? Authorizing Provider   denosumab (PROLIA) 60 mg/mL injection 60 mg by SubCUTAneous route. Yes Provider, Historical   loratadine (CLARITIN) 10 mg tablet Take 10 mg by mouth. Yes Provider, Historical   esomeprazole (NEXIUM) 40 mg capsule take 1 capsule by mouth once daily 7/9/18  Yes Provider, Historical   PREVIDENT 5000 SENSITIVE 1.1-5 % pste  4/27/18  Yes Provider, Historical   DENAVIR 1 % topical cream  10/30/17  Yes Provider, Historical          Allergies   Allergen Reactions    Lisinopril Rash and Angioedema    Pcn [Penicillins] Other (comments)     Hallucinations, confusion    Septra [Sulfamethoprim Ds] Rash         Objective:     Visit Vitals  /69 (BP 1 Location: Left arm, BP Patient Position: Sitting)   Pulse 61   Temp 97.7 °F (36.5 °C) (Oral)   Resp 18   Ht 5' 8\" (1.727 m)   Wt 161 lb (73 kg)   LMP 04/01/2015   SpO2 97%   BMI 24.48 kg/m²       Pain Scale: 0 - No pain/10  Pain Location:       Physical Exam:    GENERAL: alert, cooperative  EYE: negative  LYMPHATIC: Cervical, supraclavicular, and axillary nodes normal.   THROAT & NECK: normal and no erythema or exudates noted. LUNG: clear to auscultation bilaterally  HEART: regular rate and rhythm  ABDOMEN: soft, non-tender  EXTREMITIES: no cyanosis or edema  SKIN: negative  NEUROLOGIC: negative        Assessment:     1.  Right breast carcinoma:  T2 N0 Mx (Stage IIA) infiltrating ductal carcinoma, Tumor size 3.5 cm, LN -ve, grade 3, ER -ve, IN -ve, Her 2 -ve    ypT1c N0    ECOG PS 0  Intent of therapy - curative    LVEF 60-65% - repeated on 2/19/16 after completion of chemotherapy    Adriamycin / cytoxan - s/p 4 cycles  Taxol/Carboplatin, completed 9 weeks    Therapy stopped because of recurrent cytopenia     S/p bilateral mastectomy - (2/23/2016) 1.8 cm of residual tumor   Asymptomatic  In remission    Symptom management form reviewed with patient. Plan:       > Observation  > Follow-up in 1 year      Signed by: Sebastián Bravo MD                     June 7, 2019        CC.  MD Basia Interiano MD

## 2019-06-07 NOTE — PROGRESS NOTES
Identified pt with two pt identifiers(name and ). Reviewed record in preparation for visit and have obtained necessary documentation. Chief Complaint   Patient presents with    Breast Cancer     One year follow up      Visit Vitals  /69 (BP 1 Location: Left arm, BP Patient Position: Sitting)   Pulse 61   Temp 97.7 °F (36.5 °C) (Oral)   Resp 18   Ht 5' 8\" (1.727 m)   Wt 161 lb (73 kg)   LMP 2015   SpO2 97%   BMI 24.48 kg/m²       Health Maintenance Due   Topic    Shingrix Vaccine Age 50> (1 of 2)       Coordination of Care Questionnaire:  :   1) Have you been to an emergency room, urgent care, or hospitalized since your last visit? If yes, where when, and reason for visit? no       2. Have seen or consulted any other health care provider since your last visit? If yes, where when, and reason for visit? NO      3) Do you have an Advanced Directive/ Living Will in place? NO  If yes, do we have a copy on file NO  If no, would you like information NO    Patient is accompanied by self I have received verbal consent from Jesu Lin to discuss any/all medical information while they are present in the room.

## 2019-10-24 ENCOUNTER — OFFICE VISIT (OUTPATIENT)
Dept: SURGERY | Age: 59
End: 2019-10-24

## 2019-10-24 VITALS
HEIGHT: 68 IN | WEIGHT: 163 LBS | DIASTOLIC BLOOD PRESSURE: 69 MMHG | BODY MASS INDEX: 24.71 KG/M2 | HEART RATE: 61 BPM | SYSTOLIC BLOOD PRESSURE: 116 MMHG

## 2019-10-24 DIAGNOSIS — Z90.13 S/P BILATERAL MASTECTOMY: ICD-10-CM

## 2019-10-24 DIAGNOSIS — Z17.1 MALIGNANT NEOPLASM OF UPPER-INNER QUADRANT OF RIGHT BREAST IN FEMALE, ESTROGEN RECEPTOR NEGATIVE (HCC): Primary | ICD-10-CM

## 2019-10-24 DIAGNOSIS — C50.211 MALIGNANT NEOPLASM OF UPPER-INNER QUADRANT OF RIGHT BREAST IN FEMALE, ESTROGEN RECEPTOR NEGATIVE (HCC): Primary | ICD-10-CM

## 2019-10-24 NOTE — PATIENT INSTRUCTIONS

## 2019-10-24 NOTE — PROGRESS NOTES
HISTORY OF PRESENT ILLNESS  Katie Stout is a 64 y.o. female. HPI ESTABLISHED patient here for follow up RIGHT breast cancer. No new breast complaints at this time. Continues to have occasional pain to LEFT breast.       History of breast cancer-  RIGHT breast IDC T2N0 triple negative  Completed neoadjuvant chemotherapy. Followed by Dr. Daniel Wayne (originally 3.5 cm, 1.8 cm post neoadjuvant chemo)  2/23/16- BILATERAL mastectomy with reconstruction, RIGHT SLNB and port removal  .   No radiation. 5/2016- BILATERAL implants placed by Dr. Terri Sandhu. 12/2016- BILATERAL revision breast reconstruction.     No family history of breast or ovarian cancer. Patient has VUS BRCA 1 Marigene Pu).     Breast imaging-  4/1/19 - LEFT breast US performed    OB History    None      Obstetric Comments   Menarche:  13. LMP: 4/8/15. # of Children:  3. Age at Delivery of First Child:  14.   Hysterectomy/oophorectomy:  NO/NO. Breast Bx:  yes. Hx of Breast Feeding:  no. BCP:  yes.  Hormone therapy:  no.                  Past Surgical History:   Procedure Laterality Date    BREAST SURGERY PROCEDURE UNLISTED Right 8/13/15    RIGHT axillary SLNB and port a cath insertion    ENDOSCOPY, COLON, DIAGNOSTIC  12/12/11    Dr. John Anderson Right 7/2015    HX BREAST RECONSTRUCTION Bilateral 2/23/2016    BREAST RECONSTRUCTION performed by Kush Felder MD at [address] [place] [address] Bilateral 5/23/2016    REMOVAL TISSUE EXPANDERS BILATERAL BREAST / RECONSTRUCTION BILATERAL BREASTS / GEL IMPLANTS  performed by Kush Felder MD at [address] HX BREAST RECONSTRUCTION Bilateral 12/19/2016    REVISION BILATERAL BREAST RECONSTRUCTION WITH EXCISION OF EXCESS TISSUE / LATERAL BREAST BILATERAL NIPPLE RECONSTRUCTION WITH FTSG  performed by Kush Felder MD at [address] HX GI      COLONOSCOPY    HX GI      ENDOSCOPY    HX GYN      uterine ablation    HX MASTECTOMY Bilateral 2/23/2016    BILATERAL BREAST MASTECTOMY, BILATERAL BREAST RECONSTRUCTION WITH TISSUE EXPANDERS AFTER MASTECTOMIES REMOVE PORT LEFT CHEST performed by Darci Garcia MD at [facility]    HX TONSILLECTOMY      HX TUBAL LIGATION      HX VASCULAR ACCESS      PORT-A-CATH    HX VASCULAR ACCESS      PORT-A-CATH REMOVAL     ROS    Physical Exam   Constitutional: She appears well-developed and well-nourished. Pulmonary/Chest:       Musculoskeletal: Normal range of motion. UE x 2   Lymphadenopathy:     She has no axillary adenopathy. Right: No supraclavicular adenopathy present. Left: No supraclavicular adenopathy present. Skin: Skin is warm, dry and intact. Chest and breasts examined   Psychiatric: She has a normal mood and affect. Her speech is normal and behavior is normal.     Visit Vitals  /69   Pulse 61   Ht 5' 8\" (1.727 m)   Wt 163 lb (73.9 kg)   LMP 04/01/2015   BMI 24.78 kg/m²     ASSESSMENT and PLAN  Encounter Diagnoses   Name Primary?  Malignant neoplasm of upper-inner quadrant of right breast in female, estrogen receptor negative ([facility] [address] Yes    S/P bilateral mastectomy      Normal exam with no evidence of local recurrence. RTC in 1 year or sooner PRN. She is comfortable with this plan. All questions answered and she stated understanding.

## 2019-12-12 ENCOUNTER — PATIENT MESSAGE (OUTPATIENT)
Dept: INTERNAL MEDICINE CLINIC | Age: 59
End: 2019-12-12

## 2019-12-12 DIAGNOSIS — Z13.220 SCREENING FOR HYPERLIPIDEMIA: Primary | ICD-10-CM

## 2019-12-13 NOTE — TELEPHONE ENCOUNTER
----- Message from [address] Sudha Woodruff sent at 12/12/2019 12:43 PM EST -----  Regarding: FW: Non-Urgent Medical Question  Contact: [phone]    ----- Message -----  From: Chilango Rosario  Sent: 12/12/2019  12:41 PM EST  To: [facility] Nurse Pool  Subject: Non-Urgent Medical Question                      Good afternoon,  I would like to know if Dr. Arthur Woods can order a cholesterol test for me. My insurance company requires a cholesterol check for my health assessment. I need it before this month is over. Dr. Margie Uribe (Rheumatology) has done blood work , but she did not check my cholesterol and I totally forgot to ask. Please let me know. Also let me know if I need to schedule an appointment soon. Thank you for your help.

## 2019-12-23 DIAGNOSIS — Z13.220 SCREENING FOR HYPERLIPIDEMIA: ICD-10-CM

## 2019-12-24 LAB
CHOLEST SERPL-MCNC: 200 MG/DL (ref 100–199)
HDLC SERPL-MCNC: 55 MG/DL
LDLC SERPL CALC-MCNC: 134 MG/DL (ref 0–99)
TRIGL SERPL-MCNC: 56 MG/DL (ref 0–149)
VLDLC SERPL CALC-MCNC: 11 MG/DL (ref 5–40)

## 2020-06-08 ENCOUNTER — VIRTUAL VISIT (OUTPATIENT)
Dept: ONCOLOGY | Age: 60
End: 2020-06-08

## 2020-06-08 DIAGNOSIS — Z85.3 HISTORY OF BREAST CANCER: Primary | ICD-10-CM

## 2020-06-08 NOTE — PROGRESS NOTES
Follow up Note        Patient: Sima Fletcher MRN: [id number]  SSN: [id number]    YOB: 1960  Age: 64 y.o. Sex: female        Reason for Visit:   Sima Fletcher is a 64 y.o. female who is seen by synchronous (real-time) audio-video technology for follow up of right sided breast carcinoma      Diagnosis:     1. Right breast carcinoma:  T2 N0 Mx (Stage IIA) infiltrating ductal carcinoma, Tumor size 3.5 cm, LN -ve, grade 3, ER -ve, LA -ve, Her 2 -ve      Treatment:     1. Neoadjuvant chemotherapy   S/p Taxol, Carboplatin - completed 9 weeks on     Therapy stopped due to significant cytopenias (11/25/16)   Adriamycin, Cytoxan -  Cycle 4 completed 1/27/16    2. Bilateral mastectomy with reconstruction by tissue expanders 2/23/16          Subjective:      Sima Fletcher is a 64 y.o. female with right sided breast cancer. She felt a lump in her breast, underwent a mammogram, was noted to have a 3.5 cm mass in the medial upper part of the breast. A biopsy of the mass confirmed a nuclear grade III, ER -ve, LA -ve, Her 2 -ve. [place] LN biopsy shows no evidence of disease in the LNs. She completed neoadjuvant chemotherapy and then underwent bilateral mastectomies. She had reconstructive surgery by Dr. Ruth Leal 12/19/2017. Ms. Alan Muñoz feels well with no complaints. She denies headaches, new bone pains, or weight loss. She continues to follow with rheumatology for her Sjogrens syndrome.       Review of Systems:    Constitutional: negative  Eyes: negative  Ears, Nose, Mouth, Throat, and Face: negative  Respiratory: negative  Cardiovascular: negative  Gastrointestinal: nausea  Hematologic/Lymphatic: negative  Skin: negative  Musculoskeletal: arthralgias  Neurological: negative      Past Medical History:   Diagnosis Date    ARF (acute renal failure) ([facility] [address] 3/12/11    as of 8/11/15 pt states has resolved and is not currently being folllowed by specialist    Autoimmune disease ([facility] [address] 2016    Alice Pal SYNDROM NEW DX & RA    Cancer ([facility] [address] 7/2015    triple (-) breast ca    Chest pain     Per Pt on Rt side and arm pit only, resolves with Tylenol and states she believes it is related to her tumor    Colon polyp     Dizziness     pt states it is vertigo    Echocardiogram normal 2/19/16    Family hx of colon cancer     History of chemotherapy     AUG 2015- JAN 2016    Ill-defined condition     HAD CHEMO LAST JAN 2016    Ill-defined condition     rhematoid arthritis,sjogren syndrome    Nephrotic syndrome 3/12/2011    biopsy inconclusive but steroid responsive    TMJ (temporomandibular joint disorder)     Toothache     being treated for tooth ache x 1 week.  pain better with treatment     Past Surgical History:   Procedure Laterality Date    BREAST SURGERY PROCEDURE UNLISTED Right 8/13/15    RIGHT axillary SLNB and port a cath insertion    ENDOSCOPY, COLON, DIAGNOSTIC  12/12/11    Dr. Junior Pressley HX BREAST BIOPSY Right 7/2015    HX BREAST RECONSTRUCTION Bilateral 2/23/2016    BREAST RECONSTRUCTION performed by Dano Lazaro MD at [address] HX BREAST RECONSTRUCTION Bilateral 5/23/2016    REMOVAL TISSUE EXPANDERS BILATERAL BREAST / RECONSTRUCTION BILATERAL BREASTS / GEL IMPLANTS  performed by Dano Lazaro MD at [address] HX BREAST RECONSTRUCTION Bilateral 12/19/2016    REVISION BILATERAL BREAST RECONSTRUCTION WITH EXCISION OF EXCESS TISSUE / LATERAL BREAST BILATERAL NIPPLE RECONSTRUCTION WITH FTSG  performed by Dano Lazaro MD at [address] HX GI      COLONOSCOPY    HX GI      ENDOSCOPY    HX GYN      uterine ablation    HX MASTECTOMY Bilateral 2/23/2016    BILATERAL BREAST MASTECTOMY, BILATERAL BREAST RECONSTRUCTION WITH TISSUE EXPANDERS AFTER MASTECTOMIES REMOVE PORT LEFT CHEST performed by Tushar Moore MD at [address] HX TONSILLECTOMY      HX TUBAL LIGATION      HX VASCULAR ACCESS      PORT-A-CATH    HX VASCULAR ACCESS      [facility] REMOVAL      Family History   Problem Relation Age of Onset   [facility] Cancer Mother         colon cancer    Hypertension Mother     Kidney Disease Mother         dialysis    Alcohol abuse Father     Stroke Sister     Cancer Brother         bone cancer    Anemia Daughter     Diabetes Brother     Heart Surgery Sister         CABG [address]    Heart Attack Sister     No Known Problems Brother     Anesth Problems Son     Delayed Awakening Son      Social History     Tobacco Use    Smoking status: Never Smoker    Smokeless tobacco: Never Used   Substance Use Topics    Alcohol use: No      Prior to Admission medications    Medication Sig Start Date End Date Taking? Authorizing Provider   denosumab (PROLIA) 60 mg/mL injection 60 mg by SubCUTAneous route. Yes Provider, Historical   loratadine (CLARITIN) 10 mg tablet Take 10 mg by mouth. Yes Provider, Historical   PREVIDENT 5000 SENSITIVE 1.1-5 % pste  4/27/18  Yes Provider, Historical   DENAVIR 1 % topical cream  10/30/17   Provider, Historical          Allergies   Allergen Reactions    Lisinopril Rash and Angioedema    Pcn [Penicillins] Other (comments)     Hallucinations, confusion    Septra [Sulfamethoprim Ds] Rash         Objective:     General: alert, cooperative, no distress   Mental  status: normal mood, behavior, speech, dress, motor activity, and thought processes, able to follow commands   HENT: NCAT   Neck: no visualized mass   Resp: no respiratory distress   Neuro: no gross deficits   Skin: no discoloration or lesions of concern on visible areas   Psychiatric: normal affect, consistent with stated mood, no evidence of hallucinations       Due to this being a TeleHealth evaluation (During XZTA-40 public health emergency), many elements of the physical examination are unable to be assessed. Evaluation of the following organ systems was limited: Vitals/Constitutional/EENT/Resp/CV/GI//MS/Neuro/Skin/Heme-Lymph-Imm. Assessment:     1.  Right breast carcinoma:  T2 N0 Mx (Stage IIA) infiltrating ductal carcinoma, Tumor size 3.5 cm, LN -ve, grade 3, ER -ve, TX -ve, Her 2 -ve    ypT1c N0    ECOG PS 0  Intent of therapy - curative    LVEF 60-65% - repeated on 2/19/16 after completion of chemotherapy    Adriamycin / cytoxan - s/p 4 cycles  Taxol/Carboplatin, completed 9 weeks    Therapy stopped because of recurrent cytopenia     S/p bilateral mastectomy - (2/23/2016) 1.8 cm of residual tumor   Asymptomatic  In remission  At this point she has crossed the 5 fat[address] cleopatra from diagnosis. Given the triple negative nature of the initial breast cancer, the likelihood of recurrence is very small. Thus I recommended no follow up with medical oncology. Plan:       > Observation  > Return as needed      Signed by: Tierra Stark MD                     June 8, 2020        CC. MD Sea Jimenez MD      I was in the office while conducting this encounter. The patient was at her home. Consent:  She and/or her healthcare decision maker is aware that this patient-initiated Telehealth encounter is a billable service, with coverage as determined by her insurance carrier. She is aware that she may receive a bill and has provided verbal consent to proceed: Yes    Pursuant to the emergency declaration under the [address] and the [facility], [address]5 waiver authority and the [employer] Act, this Virtual  Visit was conducted, with patient's (and/or legal guardian's) consent, to reduce the patient's risk of exposure to COVID-19 and provide necessary medical care. Services were provided through a video synchronous discussion virtually to substitute for in-person visit.

## 2020-06-08 NOTE — PROGRESS NOTES
63 y/o AAF here for a visit meeting via virtual via [employer]. me. Hx (R) sided breast ca, triple negative. Had bilateral mastectomies 2/23/16 and port removal.    Prolia due on 6/12/2020.    1. Have you been to the ER, urgent care clinic since your last visit? Hospitalized since your last visit? No    2. Have you seen or consulted any other health care providers outside of the [address] [place] [address] since your last visit? Include any pap smears or colon screening.  No

## 2020-07-22 ENCOUNTER — TELEPHONE (OUTPATIENT)
Dept: INTERNAL MEDICINE CLINIC | Age: 60
End: 2020-07-22

## 2020-07-22 NOTE — TELEPHONE ENCOUNTER
Called and left a detailed message for patient . Patient needs a visit with Dr Joyce Ayers.  Gave patient appt for Thursday, July 23, 2020 12:00 PM

## 2020-07-22 NOTE — TELEPHONE ENCOUNTER
Patient states she has Carmine [address]. 16. needs a call back to discuss getting something called in or if appt required. Please call.  Thank you

## 2020-07-23 ENCOUNTER — TELEPHONE (OUTPATIENT)
Dept: INTERNAL MEDICINE CLINIC | Age: 60
End: 2020-07-23

## 2020-07-23 NOTE — TELEPHONE ENCOUNTER
----- Message from Kyung Bernal sent at 7/23/2020  8:11 AM EDT -----  Regarding: Dr. Blu Willett / telephone  Contact: [phone]  Caller's first and last name and relationship (if not the patient): n/a  Best contact number(s): [phone]  Whose call is being returned: the practice - [facility]  Details to clarify the request: n/a

## 2020-07-23 NOTE — TELEPHONE ENCOUNTER
Spoke with patient. Two pt identifiers confirmed. Patient states that she thinks that yesterday her eyes were red due to allergies. Patient states that she is much better today and would like to cancel today's appointment. Patient advised that her appointment has been cancelled and to give the office a call if anything changes. Pt verbalized understanding of information discussed w/ no further questions at this time.

## 2020-07-29 NOTE — PROGRESS NOTES
Have you been tested for COVID-19 in the past 7 days? No    Do you have a personal history of COVID-19 within the past 28 days? No  If Yes, What was the method of testing: clinical assumption or test result? Have you had close contact with a known to be positive COVID-19 patient within the past 14 days? No    Are you a healthcare worker or ? No  If Yes, have you been exposed to COVID-19 without proper PPE? Do you live in a SNF, adult home or other institutional setting? No  If Yes, have they experienced a flood of COVID-19 positive patients?     In the past 2-14 days have you had any of the following symptoms No   Cough   New onset Shortness of breath or difficulty breathing    Or at least two of these symptoms:None   Fever greater than 100 F   Chills   Repeated shaking with chills   Muscle pain   Headache   Sore throat   New loss of taste or smell   New onset diarrhea

## 2020-07-30 ENCOUNTER — HOSPITAL ENCOUNTER (OUTPATIENT)
Dept: CT IMAGING | Age: 60
Setting detail: OBSERVATION
Discharge: HOME | End: 2020-07-31
Attending: INTERNAL MEDICINE | Admitting: INTERNAL MEDICINE
Payer: COMMERCIAL

## 2020-07-30 DIAGNOSIS — R80.9 PROTEINURIA: ICD-10-CM

## 2020-07-30 LAB
ABO + RH BLD: NORMAL
BLOOD GROUP ANTIBODIES SERPL: NORMAL
HCT VFR BLD AUTO: 32.4 % (ref 35–47)
HCT VFR BLD AUTO: 35.5 % (ref 35–47)
INR PPP: 1.1 (ref 0.9–1.1)
PROTHROMBIN TIME: 11.2 SEC (ref 9–11.1)
SPECIMEN EXP DATE BLD: NORMAL

## 2020-07-30 PROCEDURE — 85610 PROTHROMBIN TIME: CPT

## 2020-07-30 PROCEDURE — 88341 IMHCHEM/IMCYTCHM EA ADD ANTB: CPT

## 2020-07-30 PROCEDURE — 88305 TISSUE EXAM BY PATHOLOGIST: CPT

## 2020-07-30 PROCEDURE — 99218 HC RM OBSERVATION: CPT

## 2020-07-30 PROCEDURE — 88313 SPECIAL STAINS GROUP 2: CPT

## 2020-07-30 PROCEDURE — 77030018781

## 2020-07-30 PROCEDURE — 36415 COLL VENOUS BLD VENIPUNCTURE: CPT

## 2020-07-30 PROCEDURE — 88346 IMFLUOR 1ST 1ANTB STAIN PX: CPT

## 2020-07-30 PROCEDURE — 86900 BLOOD TYPING SEROLOGIC ABO: CPT

## 2020-07-30 PROCEDURE — 50200 RENAL BIOPSY PERQ: CPT

## 2020-07-30 PROCEDURE — 88348 ELECTRON MICROSCOPY DX: CPT

## 2020-07-30 PROCEDURE — 77030040395 HC NDL BIOP COAX INTRO MRTM -B

## 2020-07-30 PROCEDURE — 74011250636 HC RX REV CODE- 250/636: Performed by: RADIOLOGY

## 2020-07-30 PROCEDURE — 88342 IMHCHEM/IMCYTCHM 1ST ANTB: CPT

## 2020-07-30 PROCEDURE — 74011250636 HC RX REV CODE- 250/636: Performed by: INTERNAL MEDICINE

## 2020-07-30 PROCEDURE — 77030003666 HC NDL SPINAL BD -A

## 2020-07-30 PROCEDURE — 94760 N-INVAS EAR/PLS OXIMETRY 1: CPT

## 2020-07-30 PROCEDURE — 85014 HEMATOCRIT: CPT

## 2020-07-30 PROCEDURE — 88350 IMFLUOR EA ADDL 1ANTB STN PX: CPT

## 2020-07-30 PROCEDURE — 77030014115

## 2020-07-30 RX ORDER — FENTANYL CITRATE 50 UG/ML
100 INJECTION, SOLUTION INTRAMUSCULAR; INTRAVENOUS
Status: DISCONTINUED | OUTPATIENT
Start: 2020-07-30 | End: 2020-07-30

## 2020-07-30 RX ORDER — MIDAZOLAM HYDROCHLORIDE 1 MG/ML
5 INJECTION INTRAMUSCULAR; INTRAVENOUS
Status: DISCONTINUED | OUTPATIENT
Start: 2020-07-30 | End: 2020-07-30

## 2020-07-30 RX ORDER — SODIUM CHLORIDE 9 MG/ML
25 INJECTION, SOLUTION INTRAVENOUS CONTINUOUS
Status: DISCONTINUED | OUTPATIENT
Start: 2020-07-30 | End: 2020-07-30

## 2020-07-30 RX ORDER — CLONIDINE HYDROCHLORIDE 0.1 MG/1
0.1 TABLET ORAL AS NEEDED
Status: DISCONTINUED | OUTPATIENT
Start: 2020-07-30 | End: 2020-07-31 | Stop reason: HOSPADM

## 2020-07-30 RX ORDER — DIPHENHYDRAMINE HCL 25 MG
25 CAPSULE ORAL
COMMUNITY
End: 2021-10-29

## 2020-07-30 RX ORDER — SODIUM CHLORIDE 0.9 % (FLUSH) 0.9 %
5-40 SYRINGE (ML) INJECTION AS NEEDED
Status: DISCONTINUED | OUTPATIENT
Start: 2020-07-30 | End: 2020-07-31 | Stop reason: HOSPADM

## 2020-07-30 RX ORDER — SODIUM CHLORIDE 0.9 % (FLUSH) 0.9 %
5-40 SYRINGE (ML) INJECTION EVERY 8 HOURS
Status: DISCONTINUED | OUTPATIENT
Start: 2020-07-30 | End: 2020-07-31 | Stop reason: HOSPADM

## 2020-07-30 RX ADMIN — MIDAZOLAM HYDROCHLORIDE 1 MG: 1 INJECTION, SOLUTION INTRAMUSCULAR; INTRAVENOUS at 10:29

## 2020-07-30 RX ADMIN — Medication 10 ML: at 22:00

## 2020-07-30 RX ADMIN — SODIUM CHLORIDE 25 ML/HR: 900 INJECTION, SOLUTION INTRAVENOUS at 10:25

## 2020-07-30 RX ADMIN — FENTANYL CITRATE 50 MCG: 50 INJECTION, SOLUTION INTRAMUSCULAR; INTRAVENOUS at 10:28

## 2020-07-30 NOTE — PROGRESS NOTES
@6544 Perfect serve to Dr. Esperanza Peguero to inform of Hct 32.4. New orders received. Pt's /62, left flank dsg. Dry & intact. Bedside and Verbal shift change report given to Umer Conteh RN (oncoming nurse) by Anju Greene RN (offgoing nurse). Report included the following information SBAR, Kardex, Intake/Output and MAR and events of the day.

## 2020-07-30 NOTE — ROUTINE PROCESS
1000- Pt in for renal biopsy. Workup completed. Dr Gan Second in to consult pt for procedure. Pt aware of risks and benefits and wishes to proceed. 80- Dr Wei Garsia in to assess pt and review orders. Aware that H&H clotted and multiple attempt for redraw. 1145- ED called for US to redraw H&H.   1400- Pt denies pain. VSS. Drsg to back D/I. Pt susi PO.

## 2020-07-30 NOTE — H&P
[facility] Observation Note        NAME: John Hightower       :  1960       MRN:  [id number]     Date/Time: 2020    Risk of deterioration: low       Assessment:    Plan:  S/P CT guided renal biopsy  Proteinuria-NRP 3.8 g  Sjogren's syndrome Seen in PACU  Stable post biopsy  Rack urine  Reviewed with pt-no heavy lifting or straining x 2 weeks post dc       Subjective:     Chief Complaint:  Good to see you    Review of Systems: no n/v/cp/sob/f/c  (+) dry eyes/dry mouth    Objective:     VITALS:   Last 24hrs VS reviewed since prior progress note. Most recent are:  Visit Vitals  /68   Pulse 60   Temp 98.3 °F (36.8 °C)   Resp 17   SpO2 100%     SpO2 Readings from Last 6 Encounters:   20 100%   19 97%   19 97%   18 98%   18 96%   18 98%    O2 Flow Rate (L/min): 2 l/min   No intake or output data in the 24 hours ending 20 1458     Telemetry Reviewed     PHYSICAL EXAM:    General   well developed, well nourished, appears stated age, in no acute distress  EENT  Normocephalic, Atraumatic, PERRLA, EOMI, sclera clear, nares clear, pharynx normal  Respiratory   Clear To Auscultation bilaterally  Cardiology  Regular Rate and Rythmn  Abdominal  Soft, non-tender, non-distended, positive bowel sounds, no hepatosplenomegaly  Extremities  No clubbing, cyanosis, or edema. Pulses intact.   Neurological  No focal neurological deficits noted              Lab Data Reviewed: (see below)    Medications Reviewed: (see below)    PMH/SH reviewed - no change compared to H&P  _____________________________________________  ___________________________________________________    Attending Physician: Barb Martinez MD     ____________________________________________________  MEDICATIONS:  Current Facility-Administered Medications   Medication Dose Route Frequency    sodium chloride (NS) flush 5-40 mL  5-40 mL IntraVENous Q8H    sodium chloride (NS) flush 5-40 mL  5-40 mL IntraVENous PRN    cloNIDine HCL (CATAPRES) tablet 0.1 mg  0.1 mg Oral PRN    cloNIDine HCL (CATAPRES) tablet 0.1 mg  0.1 mg Oral PRN     Current Outpatient Medications   Medication Sig    diphenhydrAMINE (BenadryL) 25 mg capsule Take 25 mg by mouth every six (6) hours as needed for Allergies.  denosumab (PROLIA) 60 mg/mL injection 60 mg by SubCUTAneous route.  loratadine (CLARITIN) 10 mg tablet Take 10 mg by mouth.  PREVIDENT 5000 SENSITIVE 1.1-5 % pste     DENAVIR 1 % topical cream         LABS:  Recent Labs     07/30/20  0948   HCT 35.5     No results for input(s): NA, K, CL, CO2, BUN, CREA, GLU, CA, MG, PHOS, URICA, PTH, XPTHCT, PTHN, XPTHNT in the last 72 hours. No lab exists for component: IPTH  No results for input(s): ALT, AP, TBIL, TBILI, TP, ALB, GLOB, GGT, AML, LPSE in the last 72 hours. No lab exists for component: SGOT, GPT, AMYP, HLPSE  Recent Labs     07/30/20  0948   INR 1.1   PTP 11.2*      No results for input(s): FE, TIBC, PSAT, FERR in the last 72 hours. No results for input(s): PH, PCO2, PO2 in the last 72 hours. No results for input(s): CPK, CKNDX, TROIQ in the last 72 hours.     No lab exists for component: CPKMB  Lab Results   Component Value Date/Time    Glucose (POC) 99 01/27/2016 11:08 AM    Glucose (POC) 160 (H) 12/09/2015 10:27 AM    Glucose (POC) 182 (H) 11/11/2015 10:27 AM    Glucose (POC) 231 (H) 10/07/2015 09:28 AM    Glucose (POC) 251 (H) 09/16/2015 09:28 AM    Glucose (POC) 159 (H) 03/22/2011 04:57 PM    Glucose (POC) 133 (H) 03/22/2011 12:26 PM    Glucose (POC) 94 03/22/2011 07:47 AM    Glucose (POC) 160 (H) 03/21/2011 08:43 PM    Glucose (POC) 164 (H) 03/21/2011 04:51 PM

## 2020-07-30 NOTE — DISCHARGE INSTRUCTIONS
Patient Education        Needle Biopsy of the Kidney: What to Expect at Home  Your Recovery     A kidney biopsy is a test to take a sample (biopsy) of kidney. The doctor puts a long needle through your back (flank) into the kidney. Another doctor will look at the kidney tissue with a microscope to check for problems. After the test, you will be told to lie down on your back for several hours. After this, you should avoid strenuous activity for the next 2 to 3 days. It's normal to feel some soreness in the area of the biopsy for 2 to 3 days. You may have a small amount of bleeding on the bandage after the test. You may notice some blood in your urine after the test. This should go away within 12 to 24 hours. If it doesn't, call your doctor. This care sheet gives you a general idea about how long it will take for you to recover. But each person recovers at a different pace. Follow the steps below to feel better as quickly as possible. How can you care for yourself at home? Activity  · Avoid lifting anything that would make you strain. This may include heavy grocery bags and milk containers, a heavy briefcase or backpack, cat litter or dog food bags, a vacuum , or a child. · Avoid strenuous activities, such as bicycle riding, jogging, weight lifting, or aerobic exercise, until your doctor says it is okay. · Try to walk each day. Start by walking a little more than you did the day before. Bit by bit, increase the amount you walk. Walking boosts blood flow and helps prevent pneumonia and constipation. · Rest when you feel tired. Getting enough sleep will help you recover. · Ask your doctor when it is okay for you to have sex. Diet  · You can eat your normal diet. If your stomach is upset, try bland, low-fat foods like plain rice, broiled chicken, toast, and yogurt. · Drink plenty of fluids to avoid becoming dehydrated. Medicines  · Your doctor will tell you if and when you can restart your medicines. He or she will also give you instructions about taking any new medicines. · If you take aspirin or some other blood thinner, ask your doctor if and when to start taking it again. Make sure that you understand exactly what your doctor wants you to do. · Do not take aspirin or anti-inflammatory medicines for a week after the biopsy. Incision care  · Keep a bandage over the puncture site for the first 1 or 2 days. Follow-up care is a key part of your treatment and safety. Be sure to make and go to all appointments, and call your doctor if you are having problems. It's also a good idea to know your test results and keep a list of the medicines you take. When should you call for help? [id number] anytime you think you may need emergency care. For example, call if:  · You passed out (lost consciousness). Call your doctor now or seek immediate medical care if:  · You have signs of infection, such as:  ? Increased pain, swelling, warmth, or redness. ? Red streaks leading from the puncture site. ? Pus draining from the puncture site. ? A fever. · Bright red blood has soaked through the bandage over the puncture site. · You have new or worse pain at the puncture site. Watch closely for any changes in your health, and call your doctor if:  · You have blood in your urine for more than 1 day. Where can you learn more? Go to http://blanka-nilson.info/  Enter S675 in the search box to learn more about \"Needle Biopsy of the Kidney: What to Expect at Home. \"  Current as of: August 12, 2019               Content Version: 12.5  © 5683-7067 [employer]wise, Incorporated. Care instructions adapted under license by [employer] (which disclaims liability or warranty for this information). If you have questions about a medical condition or this instruction, always ask your healthcare professional. [address] any warranty or liability for your use of this information.

## 2020-07-30 NOTE — PROGRESS NOTES
TRANSFER - OUT REPORT:    Verbal report given to Rose Lopez RN(name) on Malia Branch  being transferred to [facility](unit) for routine progression of care       Report consisted of patients Situation, Background, Assessment and   Recommendations(SBAR). Information from the following report(s) Procedure Summary was reviewed with the receiving nurse. Lines:   Peripheral IV 07/30/20 Left Wrist (Active)        Opportunity for questions and clarification was provided.

## 2020-07-31 VITALS
TEMPERATURE: 97.8 F | SYSTOLIC BLOOD PRESSURE: 135 MMHG | HEART RATE: 53 BPM | OXYGEN SATURATION: 97 % | RESPIRATION RATE: 17 BRPM | DIASTOLIC BLOOD PRESSURE: 81 MMHG

## 2020-07-31 LAB — HCT VFR BLD AUTO: 34.3 % (ref 35–47)

## 2020-07-31 PROCEDURE — 99218 HC RM OBSERVATION: CPT

## 2020-07-31 PROCEDURE — 85014 HEMATOCRIT: CPT

## 2020-07-31 PROCEDURE — 36415 COLL VENOUS BLD VENIPUNCTURE: CPT

## 2020-07-31 RX ADMIN — Medication 10 ML: at 06:00

## 2020-07-31 NOTE — DISCHARGE SUMMARY
[facility] DISCHARGE INSTRUCTIONS  NAME: Austin Jacobs   :  1960   MRN:  [id number]     Date/Time:  2020 9:41 AM    ADMIT DATE: 2020     DISCHARGE DATE: 2020     ADMITTING DIAGNOSIS:  proteinuria    DISCHARGE DIAGNOSIS:   s/p ct guided biopsy    MEDICATIONS:  Current Discharge Medication List      CONTINUE these medications which have NOT CHANGED    Details   diphenhydrAMINE (BenadryL) 25 mg capsule Take 25 mg by mouth every six (6) hours as needed for Allergies. denosumab (PROLIA) 60 mg/mL injection 60 mg by SubCUTAneous route. PREVIDENT 5000 SENSITIVE 1.1-5 % pste Refills: 0      DENAVIR 1 % topical cream       loratadine (CLARITIN) 10 mg tablet Take 10 mg by mouth. · It is important that you take the medication exactly as they are prescribed. · Keep your medication in the bottles provided by the pharmacist and keep a list of the medication names, dosages, and times to be taken in your wallet. · Do not take other medications without consulting your doctor. No NSAIDS, asprin for 2 weeks    Pain Management: per above medications    What to do at Home-no heavy lifting/straining x 2 weeks. If you pass any blood in the urine return to ED immediately    Recommended diet:  Regular Diet    Recommended activity: no heavy lifting for 2 weeks                                             No heavy straining for 2 weeks  If you experience any of the following symptoms then please call your primary care physician or return to the emergency room if you cannot get hold of your doctor:  Fever, chills, nausea, vomiting, diarrhea, change in mentation, falling, bleeding, shortness of breath, bruising or blood in urine. Follow Up:  Dr. Lauren Condon scheduled. Information obtained by :  I understand that if any problems occur once I am at home I am to contact my physician. I understand and acknowledge receipt of the instructions indicated above. Physician's or R.N.'s Signature                                                                  Date/Time                                                                                                                                                Patient or Representative Signature                                                          Date/Time  Sammie Salvador MD

## 2020-07-31 NOTE — PROGRESS NOTES
1930 Bedside report from [address], Pt Ax4, no s/s of distress noted. 0730 Bedside and Verbal shift change report given to [address] (oncoming nurse) by Juan Jackson (offgoing nurse). Report included the following information SBAR, Kardex, Procedure Summary, Intake/Output, MAR, Accordion, Recent Results and Quality Measures.

## 2020-07-31 NOTE — DISCHARGE SUMMARY
Physician Discharge Summary     Patient ID:  Divina Leon  [id number]  64 y.o.  1960    Admit date: 7/30/2020    Discharge date and time: 7/31/20      Admitting Physician: Saima Gillette MD     Discharge Physician: Saima Gillette MD    Admission Diagnoses: Proteinuria [R80.9]    Discharge Diagnoses: JEREMIAH  Discharged Condition: stable      Hospital Course: Pt admitted for ct guided renal biopsy. Tolerated procedure well. No bruising/ecchymosis at biopsy site. Stable for dc. Consults: IR for biopsy      Treatments: procedures: ct guided renal biopsy    Disposition: home    Patient Instructions:     Current Discharge Medication List      CONTINUE these medications which have NOT CHANGED    Details   diphenhydrAMINE (BenadryL) 25 mg capsule Take 25 mg by mouth every six (6) hours as needed for Allergies. denosumab (PROLIA) 60 mg/mL injection 60 mg by SubCUTAneous route. PREVIDENT 5000 SENSITIVE 1.1-5 % pste Refills: 0      DENAVIR 1 % topical cream       loratadine (CLARITIN) 10 mg tablet Take 10 mg by mouth.              Data Review   CBC:   Lab Results   Component Value Date/Time    WBC 3.8 06/08/2017 08:10 AM    RBC 4.16 06/08/2017 08:10 AM    HGB 12.9 06/08/2017 08:10 AM    HCT 34.3 (L) 07/31/2020 04:32 AM    PLATELET 366 78/15/7930 08:10 AM   , Coagulation:   Lab Results   Component Value Date/Time    Prothrombin time 11.2 (H) 07/30/2020 09:48 AM    INR 1.1 07/30/2020 09:48 AM    aPTT 30.6 03/20/2011 04:00 AM       Activity: No heavy lifting, straining x 2 weeks    Diet: Regular    Wound Care: None needed    Follow-up with Dr. Josh Bryant will be arranged    Signed:  Saima Gillette MD  7/31/2020  9:38 AM

## 2020-07-31 NOTE — PROGRESS NOTES
D/c instructions provided to pt with time allowed to ask questions. At d/c, pt states she has all belongings. She is d/c to lobby via w/c accompanied by . Resp even and unlabored, alert and oriented x 4.

## 2020-07-31 NOTE — PROGRESS NOTES
IVETTE plan:    -  Home with OP f/u - PCP, Nephrology  -  OBS letter to be given prior to d/c    10:08 AM - Discharge order noted.  will transport pt home around 10:30 am. Pt to scheduled her own follow-up appointments. No further concerns indicated at this time. AVS updated. Patient is ready for discharge from a Care Management standpoint. RN informed. Observation notice provided in writing to patient and/or caregiver as well as verbal explanation of the policy. Patients who are in outpatient status also receive the Observation notice. 8:42 AM - CM acknowledges inpatient admission s/p renal biopsy. CM will follow pt for consults and any needs prior to discharge. If any concerns or questions arise pertaining to pt discharge, please contact unit CM. Care Management Interventions  PCP Verified by CM: Yes  Mode of Transport at Discharge:  Other (see comment)  Transition of Care Consult (CM Consult): Discharge Planning(anticipate home with OP f/u)  Discharge Durable Medical Equipment: No  Physical Therapy Consult: No  Occupational Therapy Consult: No  Speech Therapy Consult: No  Discharge Location  Discharge Placement: Home with outpatient services    WENDY Loving  Care Manager  [phone]

## 2020-07-31 NOTE — PROGRESS NOTES
Problem: Falls - Risk of  Goal: *Absence of Falls  Description: Document Dwight Landry Fall Risk and appropriate interventions in the flowsheet. Outcome: Progressing Towards Goal  Note: Fall Risk Interventions:            Medication Interventions: Teach patient to arise slowly, Patient to call before getting OOB                   Problem: Patient Education: Go to Patient Education Activity  Goal: Patient/Family Education  Outcome: Progressing Towards Goal     Problem: Pressure Injury - Risk of  Goal: *Prevention of pressure injury  Description: Document Nam Scale and appropriate interventions in the flowsheet.   Outcome: Progressing Towards Goal  Note: Pressure Injury Interventions:  Sensory Interventions: Assess changes in LOC    Moisture Interventions: Absorbent underpads    Activity Interventions: Pressure redistribution bed/mattress(bed type)    Mobility Interventions: Pressure redistribution bed/mattress (bed type)    Nutrition Interventions: Document food/fluid/supplement intake                     Problem: Patient Education: Go to Patient Education Activity  Goal: Patient/Family Education  Outcome: Progressing Towards Goal

## 2020-10-14 ENCOUNTER — OFFICE VISIT (OUTPATIENT)
Dept: INTERNAL MEDICINE CLINIC | Age: 60
End: 2020-10-14
Payer: COMMERCIAL

## 2020-10-14 VITALS
RESPIRATION RATE: 16 BRPM | TEMPERATURE: 96.1 F | OXYGEN SATURATION: 100 % | DIASTOLIC BLOOD PRESSURE: 73 MMHG | HEIGHT: 68 IN | WEIGHT: 147 LBS | SYSTOLIC BLOOD PRESSURE: 122 MMHG | BODY MASS INDEX: 22.28 KG/M2 | HEART RATE: 65 BPM

## 2020-10-14 DIAGNOSIS — Z23 ENCOUNTER FOR IMMUNIZATION: ICD-10-CM

## 2020-10-14 DIAGNOSIS — Z13.220 SCREENING FOR HYPERLIPIDEMIA: ICD-10-CM

## 2020-10-14 DIAGNOSIS — M35.00 SJOGREN'S SYNDROME, WITH UNSPECIFIED ORGAN INVOLVEMENT (HCC): ICD-10-CM

## 2020-10-14 DIAGNOSIS — Z00.00 ROUTINE MEDICAL EXAM: Primary | ICD-10-CM

## 2020-10-14 DIAGNOSIS — Z23 NEEDS FLU SHOT: ICD-10-CM

## 2020-10-14 DIAGNOSIS — N05.2 MEMBRANOUS GLOMERULONEPHRITIS: ICD-10-CM

## 2020-10-14 DIAGNOSIS — K21.9 GASTROESOPHAGEAL REFLUX DISEASE WITHOUT ESOPHAGITIS: ICD-10-CM

## 2020-10-14 DIAGNOSIS — Z85.3 HISTORY OF BREAST CANCER: ICD-10-CM

## 2020-10-14 PROCEDURE — 90686 IIV4 VACC NO PRSV 0.5 ML IM: CPT

## 2020-10-14 PROCEDURE — 90471 IMMUNIZATION ADMIN: CPT

## 2020-10-14 PROCEDURE — 99396 PREV VISIT EST AGE 40-64: CPT

## 2020-10-14 RX ORDER — VALSARTAN 80 MG/1
TABLET ORAL
COMMUNITY
End: 2022-01-05

## 2020-10-14 NOTE — PATIENT INSTRUCTIONS
PEPCID 20MG TWICE A DAY AND BENADRYL  25MG EVERY 6 HOURS IF YOU GET SWELLING, AND STOP LOSARTAN IF YOU GET SWELLING.

## 2020-10-14 NOTE — PROGRESS NOTES
Abelardo Douglas is a 64 y.o. female who presents for annual exam.     Underwent CT guided kidney biopsy with Dr Lisa Acevedo July 30. Membranous GN. Was given losartan, on it for 2 months. No steroids given. Prior reaction to lisinopril. Angioedema. Sees Dr Cristy Yao, oncology, for history of right breast cancer. Bilateral mastectomy. Prior chemo in 2016. Released from oncology. On prolia every 6 months, through Dr Jeff Vallejo. Rheumatology. For osteoporosis. Normal vitamin d. Active regularly. Followed by Dr. Jeff Vallejo for sjogrens. Every 6 months. Up to date on colonoscopy. Prior GERD, off medication. Dr Myla Ware. Up to date on eye and dental.     Reports weight loss. Exercising more. Eating the same. Past Medical History:   Diagnosis Date    ARF (acute renal failure) ([facility] [address] [facility] 3/12/11    as of 8/11/15 pt states has resolved and is not currently being folllowed by specialist    Autoimmune disease ([facility] [address] [facility] 2016    SJOURN SYNDROM NEW DX & RA    Cancer ([facility] [address] 7/2015    triple (-) breast ca    Chest pain     Per Pt on Rt side and arm pit only, resolves with Tylenol and states she believes it is related to her tumor    Colon polyp     Dizziness     pt states it is vertigo    Echocardiogram normal 2/19/16    Family hx of colon cancer     History of chemotherapy     AUG 2015- JAN 2016    Ill-defined condition     HAD CHEMO LAST JAN 2016    Ill-defined condition     rhematoid arthritis,sjogren syndrome    Nephrotic syndrome 3/12/2011    biopsy inconclusive but steroid responsive    TMJ (temporomandibular joint disorder)     Toothache     being treated for tooth ache x 1 week.  pain better with treatment       Family History   Problem Relation Age of Onset    Cancer Mother         colon cancer    Hypertension Mother     Kidney Disease Mother         dialysis    Alcohol abuse Father     Stroke Sister     Cancer Brother         bone cancer    Anemia Daughter     Diabetes Brother     Heart Surgery Sister         CABG [address]    Heart Attack Sister     No Known Problems Brother     Anesth Problems Son     Delayed Awakening Son        Social History     Socioeconomic History    Marital status:      Spouse name: Not on file    Number of children: Not on file    Years of education: Not on file    Highest education level: Not on file   Occupational History    Not on file   Social Needs    Financial resource strain: Not on file    Food insecurity     Worry: Not on file     Inability: Not on file   [employer] needs     Medical: Not on file     Non-medical: Not on file   Tobacco Use    Smoking status: Never Smoker    Smokeless tobacco: Never Used   Substance and Sexual Activity    Alcohol use: No    Drug use: No    Sexual activity: Yes     Partners: Male     Birth control/protection: None   Lifestyle    Physical activity     Days per week: Not on file     Minutes per session: Not on file    Stress: Not on file   Relationships    Social connections     Talks on phone: Not on file     Gets together: Not on file     Attends [employer] service: Not on file     Active member of club or organization: Not on file     Attends meetings of clubs or organizations: Not on file     Relationship status: Not on file    Intimate partner violence     Fear of current or ex partner: Not on file     Emotionally abused: Not on file     Physically abused: Not on file     Forced sexual activity: Not on file   Other Topics Concern    Not on file   Social History Narrative    Not on file       Current Outpatient Medications on File Prior to Visit   Medication Sig Dispense Refill    valsartan (DIOVAN) 80 mg tablet valsartan 80 mg tablet   Take 1 tablet every day by oral route.  denosumab (PROLIA) 60 mg/mL injection 60 mg by SubCUTAneous route.  loratadine (CLARITIN) 10 mg tablet Take 10 mg by mouth.       PREVIDENT 5000 SENSITIVE 1.1-5 % pste   0    DENAVIR 1 % topical cream       diphenhydrAMINE (BenadryL) 25 mg capsule Take 25 mg by mouth every six (6) hours as needed for Allergies. No current facility-administered medications on file prior to visit. Review of Systems  Pertinent items are noted in HPI. Objective:     Visit Vitals  /73 (BP 1 Location: Left arm, BP Patient Position: Sitting)   Pulse 65   Temp (!) 96.1 °F (35.6 °C) (Temporal)   Resp 16   Ht 5' 8\" (1.727 m)   Wt 147 lb (66.7 kg)   LMP 04/01/2015   SpO2 100%   BMI 22.35 kg/m²     Gen: well appearing female  HEENT:   PERRL,normal conjunctiva. External ear and canals normal, TMs no opacification or erythema,  OP no erythema, no exudates, MMM  Neck:  Supple. Thyroid normal size, nontender, without nodules. No masses or LAD  Resp:  No wheezing, no rhonchi, no rales. CV:  RRR, normal S1S2, no murmur. GI: soft, nontender, without masses. No hepatosplenomegaly. Extrem:  +2 pulses, no edema, warm distally      Assessment/Plan:       ICD-10-CM ICD-9-CM    1. Routine medical exam  Z00.00 V70.0    2. Needs flu shot  Z23 V04.81 INFLUENZA VIRUS VAC QUAD,SPLIT,PRESV FREE SYRINGE IM   3. Screening for hyperlipidemia  Z13.220 V77.91 LIPID PANEL   4. Encounter for immunization  Z23 V03.89 varicella-zoster recombinant, PF, (SHINGRIX) 50 mcg/0.5 mL susr injection   5. Sjogren's syndrome, with unspecified organ involvement ([facility] [address]  M35.00 710.2    6. Membranous glomerulonephritis  N05.2 583.1    7. Gastroesophageal reflux disease without esophagitis  K21.9 530.81    8. History of breast cancer  Z85.3 V10.3    to have CT scan with nephrology. To have lipid check with next set of labs. Follow-up and Dispositions    · Return in about 1 year (around 10/14/2021) for follow up annual and as needed.  Chanel Nicole MD

## 2020-10-15 ENCOUNTER — TRANSCRIBE ORDER (OUTPATIENT)
Dept: SCHEDULING | Age: 60
End: 2020-10-15

## 2020-10-15 DIAGNOSIS — C50.919 MALIGNANT NEOPLASM OF BREAST (FEMALE) (HCC): Primary | ICD-10-CM

## 2020-10-20 ENCOUNTER — HOSPITAL ENCOUNTER (OUTPATIENT)
Dept: CT IMAGING | Age: 60
Discharge: HOME | End: 2020-10-20
Attending: INTERNAL MEDICINE
Payer: COMMERCIAL

## 2020-10-20 DIAGNOSIS — C50.919 MALIGNANT NEOPLASM OF UNSPECIFIED SITE OF UNSPECIFIED FEMALE BREAST (HCC): ICD-10-CM

## 2020-10-20 DIAGNOSIS — C50.919 MALIGNANT NEOPLASM OF BREAST (FEMALE) (HCC): ICD-10-CM

## 2020-10-20 PROCEDURE — 74011000636 HC RX REV CODE- 636: Performed by: INTERNAL MEDICINE

## 2020-10-20 PROCEDURE — 71260 CT THORAX DX C+: CPT

## 2020-10-20 PROCEDURE — 74177 CT ABD & PELVIS W/CONTRAST: CPT

## 2020-10-20 PROCEDURE — 74011000255 HC RX REV CODE- 255: Performed by: INTERNAL MEDICINE

## 2020-10-20 RX ORDER — BARIUM SULFATE 20 MG/ML
900 SUSPENSION ORAL
Status: COMPLETED | OUTPATIENT
Start: 2020-10-20 | End: 2020-10-20

## 2020-10-20 RX ORDER — SODIUM CHLORIDE 0.9 % (FLUSH) 0.9 %
10 SYRINGE (ML) INJECTION
Status: COMPLETED | OUTPATIENT
Start: 2020-10-20 | End: 2020-10-20

## 2020-10-20 RX ADMIN — IOPAMIDOL 100 ML: 755 INJECTION, SOLUTION INTRAVENOUS at 09:17

## 2020-10-20 RX ADMIN — BARIUM SULFATE 900 ML: 21 SUSPENSION ORAL at 09:17

## 2020-10-20 RX ADMIN — Medication 10 ML: at 09:17

## 2020-10-26 ENCOUNTER — OFFICE VISIT (OUTPATIENT)
Dept: SURGERY | Age: 60
End: 2020-10-26
Payer: COMMERCIAL

## 2020-10-26 DIAGNOSIS — Z17.1 MALIGNANT NEOPLASM OF UPPER-INNER QUADRANT OF RIGHT BREAST IN FEMALE, ESTROGEN RECEPTOR NEGATIVE (HCC): Primary | ICD-10-CM

## 2020-10-26 DIAGNOSIS — C50.211 MALIGNANT NEOPLASM OF UPPER-INNER QUADRANT OF RIGHT BREAST IN FEMALE, ESTROGEN RECEPTOR NEGATIVE (HCC): Primary | ICD-10-CM

## 2020-10-26 DIAGNOSIS — Z90.13 S/P BILATERAL MASTECTOMY: ICD-10-CM

## 2020-10-26 PROCEDURE — 99213 OFFICE O/P EST LOW 20 MIN: CPT | Performed by: NURSE PRACTITIONER

## 2020-10-26 NOTE — PROGRESS NOTES
HISTORY OF PRESENT ILLNESS  Hawa Ramesh is a 64 y.o. female. HPI  ESTABLISHED patient here for follow up RIGHT breast cancer. Denies breast mass, skin changes and pain.        History of breast cancer-  [facility] Ama Ernst [facility] [address] [facility] breast IDC T2N0 triple negative  Completed neoadjuvant chemotherapy. Followed by Dr. Gonzalez Light (originally 3.5 cm, 1.8 cm post neoadjuvant chemo)  2/23/16- BILATERAL mastectomy with reconstruction, RIGHT SLNB and port removal - Dr. Johnny Wheatley  No radiation. 5/2016- BILATERAL implants placed by Dr. Bill Palacios. 12/2016- BILATERAL revision breast reconstruction.      No family history of breast or ovarian cancer. Patient has VUS BRCA 1 ([place]). OB History    No obstetric history on file. Obstetric Comments   Menarche:  8. LMP: 4/8/15. # of Children:  3. Age at Delivery of First Child:  14.   Hysterectomy/oophorectomy:  NO/NO. Breast Bx:  yes. Hx of Breast Feeding:  no. BCP:  yes.  Hormone therapy:  no.                    Past Surgical History:   Procedure Laterality Date    BREAST SURGERY PROCEDURE UNLISTED Right 8/13/15    RIGHT axillary SLNB and port a cath insertion    ENDOSCOPY, COLON, DIAGNOSTIC  12/12/11    Dr. Veronica Huang Right 7/2015    HX BREAST RECONSTRUCTION Bilateral 2/23/2016    BREAST RECONSTRUCTION performed by Ara Ramírez MD at [address] [place] [address] Bilateral 5/23/2016    REMOVAL TISSUE EXPANDERS BILATERAL BREAST / RECONSTRUCTION BILATERAL BREASTS / GEL IMPLANTS  performed by Ara Ramírez MD at [address] HX BREAST RECONSTRUCTION Bilateral 12/19/2016    REVISION BILATERAL BREAST RECONSTRUCTION WITH EXCISION OF EXCESS TISSUE / LATERAL BREAST BILATERAL NIPPLE RECONSTRUCTION WITH FTSG  performed by Ara Ramírez MD at [address] HX GI      COLONOSCOPY    HX GI      ENDOSCOPY    HX GYN      uterine ablation    HX MASTECTOMY Bilateral 2/23/2016    BILATERAL BREAST MASTECTOMY, BILATERAL BREAST RECONSTRUCTION WITH TISSUE EXPANDERS AFTER MASTECTOMIES REMOVE PORT LEFT CHEST performed by Jomarie Dakin, MD at [place] [address]    HX TONSILLECTOMY      HX TUBAL LIGATION      HX VASCULAR ACCESS      PORT-A-CATH    HX VASCULAR ACCESS      PORT-A-CATH REMOVAL         ROS    Physical Exam  Constitutional:       Appearance: Normal appearance. Chest:      Breasts:         Right: Absent. Left: Absent. Comments: Chest wall s/p bilateral mastectomy with implant reconstruction  Musculoskeletal: Normal range of motion. Comments: UE x 2   Lymphadenopathy:      Upper Body:      Right upper body: No supraclavicular or axillary adenopathy. Left upper body: No supraclavicular or axillary adenopathy. Neurological:      Mental Status: She is alert. Psychiatric:         Attention and Perception: Attention normal.         Mood and Affect: Mood normal.         Speech: Speech normal.         Behavior: Behavior normal.         ASSESSMENT and PLAN  Encounter Diagnoses   Name Primary?  Malignant neoplasm of upper-inner quadrant of right breast in female, estrogen receptor negative ([address] Yes    S/P bilateral mastectomy       Normal exam with no evidence of local recurrence. Follow-up with plastic surgery PRN. Would like to continue annual CBE here. RTC in 1 year or sooner PRN. She is comfortable with this plan. All questions answered and she stated understanding.

## 2020-10-27 ENCOUNTER — TELEPHONE (OUTPATIENT)
Dept: INTERNAL MEDICINE CLINIC | Age: 60
End: 2020-10-27

## 2020-10-27 NOTE — TELEPHONE ENCOUNTER
#[phone] pt states she would like a call back pertaining to her CT scan results. She would like to discuss the results.

## 2020-10-28 ENCOUNTER — TELEPHONE (OUTPATIENT)
Dept: INTERNAL MEDICINE CLINIC | Age: 60
End: 2020-10-28

## 2020-10-28 NOTE — TELEPHONE ENCOUNTER
----- Message from Mary Downey Annie sent at 10/27/2020  4:16 PM EDT -----  Regarding: RE: Test Results Question  Contact: [phone]  Hi,  Thank you for your response. I am aware the CT scan was ordered by Dr. Vivienne Perez. I am not asking Dr. Susana Bee to give me the results, but I would like to have a discussion with her because she is my primary doctor. I have scheduled an appointment with [facility]. On November 4th I am scheduled to see Dr. Sheeba Santoyo. I was told to ask if the office notes from my recent visit with Dr. Susana Bee can be faxed to their office. The fax number is [phone]. I would still like to talk to Dr. Susana Bee at her earliest convenience. Thank you for your help.

## 2020-11-05 ENCOUNTER — TRANSCRIBE ORDER (OUTPATIENT)
Dept: SCHEDULING | Age: 60
End: 2020-11-05

## 2020-11-05 DIAGNOSIS — R91.1 PULMONARY NODULE, RIGHT: Primary | ICD-10-CM

## 2021-01-05 ENCOUNTER — VIRTUAL VISIT (OUTPATIENT)
Dept: INTERNAL MEDICINE CLINIC | Age: 61
End: 2021-01-05

## 2021-01-05 ENCOUNTER — TELEPHONE (OUTPATIENT)
Dept: INTERNAL MEDICINE CLINIC | Age: 61
End: 2021-01-05

## 2021-01-05 NOTE — TELEPHONE ENCOUNTER
----- Message from Miles Burroughs MD sent at 1/5/2021 12:13 PM EST -----  Please contact Dr Manjula Anderson, nephrology, for recent cholesterol labs. Patient had an appointment today, was told it is high.

## 2021-01-22 ENCOUNTER — TELEPHONE (OUTPATIENT)
Dept: INTERNAL MEDICINE CLINIC | Age: 61
End: 2021-01-22

## 2021-01-22 NOTE — TELEPHONE ENCOUNTER
----- Message from Steven Walden MD sent at 1/21/2021  3:51 PM EST -----  Please request labs from Dr. Sudhir Davies

## 2021-02-11 ENCOUNTER — TELEPHONE (OUTPATIENT)
Dept: INTERNAL MEDICINE CLINIC | Age: 61
End: 2021-02-11

## 2021-02-11 NOTE — TELEPHONE ENCOUNTER
We did receive the notes from Dr. Leon Bonilla recommend, but they did not send the labs. Could you please call nephrology, Dr. Nelly Hand and tell them we need the recent labs. I have sent the patient a [employer] message to update her.

## 2021-02-11 NOTE — TELEPHONE ENCOUNTER
----- Message from Rahat Kat LPN sent at 5/99/8325  2:22 PM EST -----  Regarding: FW: Test Results Question  Contact: [phone]    ----- Message -----  From: Linda Ray  Sent: 1/21/2021  12:44 PM EST  To: [facility] Nurse Pool  Subject: Test Results Question                            Good afternoon,  On January 5, 2021 I had a virtual appointment with Dr. Adelaide Xiao. I was told she would request the lab work from Dr. Jessie Suh which includes my cholesterol test. Dr. Lisa Barney felt I needed to see Dr. Adelaide Xiao because my cholesterol was elevated. Dr. Adelaide Xiao said she would send me a message via portal with her recommendation. I am following up because I have not received a message from Dr. Adelaide Xiao. Thank you for your help.

## 2021-02-24 ENCOUNTER — PATIENT MESSAGE (OUTPATIENT)
Dept: INTERNAL MEDICINE CLINIC | Age: 61
End: 2021-02-24

## 2021-02-24 NOTE — TELEPHONE ENCOUNTER
MD Jonah Olmos LPN   Caller: Unspecified (1 week ago)             Notify patient we received the labs from Dr. Imelda Bolton.  This included a complete blood count, normal.  Fasting glucose slightly low.  Urine protein elevated, but kidney blood tests. Juanetta Cornet was no cholesterol profile done.  We will just plan to check her cholesterol at her follow-up in October.       clips[employer] message sent to patient with results

## 2021-03-26 ENCOUNTER — HOSPITAL ENCOUNTER (OUTPATIENT)
Dept: CT IMAGING | Age: 61
Discharge: HOME | End: 2021-03-26
Attending: INTERNAL MEDICINE
Payer: COMMERCIAL

## 2021-03-26 DIAGNOSIS — R91.1 PULMONARY NODULE, RIGHT: ICD-10-CM

## 2021-03-26 PROCEDURE — 71250 CT THORAX DX C-: CPT

## 2021-04-23 ENCOUNTER — TRANSCRIBE ORDER (OUTPATIENT)
Dept: SCHEDULING | Age: 61
End: 2021-04-23

## 2021-04-23 DIAGNOSIS — R91.1 PULMONARY NODULE: Primary | ICD-10-CM

## 2021-10-14 ENCOUNTER — DOCUMENTATION ONLY (OUTPATIENT)
Dept: INTERNAL MEDICINE CLINIC | Age: 61
End: 2021-10-14

## 2021-10-14 NOTE — PROGRESS NOTES
Left voicemail for patient to call to reschedule appointment for October due to provider not being available.

## 2021-10-29 ENCOUNTER — OFFICE VISIT (OUTPATIENT)
Dept: URGENT CARE | Age: 61
End: 2021-10-29
Payer: COMMERCIAL

## 2021-10-29 VITALS — TEMPERATURE: 98.6 F | RESPIRATION RATE: 16 BRPM | HEART RATE: 63 BPM | OXYGEN SATURATION: 97 %

## 2021-10-29 DIAGNOSIS — R06.7 SNEEZING: ICD-10-CM

## 2021-10-29 DIAGNOSIS — R09.81 NASAL CONGESTION: Primary | ICD-10-CM

## 2021-10-29 LAB — SARS-COV-2 POC: NEGATIVE

## 2021-10-29 PROCEDURE — 87426 SARSCOV CORONAVIRUS AG IA: CPT | Performed by: FAMILY MEDICINE

## 2021-10-29 PROCEDURE — 99202 OFFICE O/P NEW SF 15 MIN: CPT | Performed by: FAMILY MEDICINE

## 2021-10-29 NOTE — PROGRESS NOTES
Subjective: (As above and below)       This patient was seen in [facility] at [address] [place] [address] Urgent [facility] while outdoors at their vehicle due to COVID-19 pandemic with PPE and focused examination in order to decrease community viral transmission. The patient/guardian gave verbal consent to treat. Chief Complaint   Patient presents with    Cold Symptoms     Sore throat, sneezing, cough and runny nose since Wednesday     Eleni Denney is a 63 y.o. female who presents for evaluation of : nasal congestion, sneezing, scratchy throat. Symptom onset 2-3 days ago . Preceding illness: none. No other identified aggravating or alleviating factors. Symptoms are constant and overall unchanged. Promotes no decrease in PO intake of fluids. Denies: severe lethargy, SOB, vomiting/diarrhea, chest pain, chest pain with breathing, severe headache, non-intractable fevers . Known Exposure to COVID-19: no        Review of Systems - negative except as listed above    Reviewed PmHx, RxHx, FmHx, SocHx, AllgHx and updated in chart.   Family History   Problem Relation Age of Onset   [facility] Cancer Mother         colon cancer    Hypertension Mother     Kidney Disease Mother         dialysis    Alcohol abuse Father     Stroke Sister     Cancer Brother         bone cancer    Anemia Daughter     Diabetes Brother     Heart Surgery Sister         CABG 3 WAY    Heart Attack Sister     No Known Problems Brother     Anesth Problems Son     Delayed Awakening Son        Past Medical History:   Diagnosis Date    ARF (acute renal failure) ([address] 3/12/11    as of 8/11/15 pt states has resolved and is not currently being folllowed by specialist    Autoimmune disease ([facility] 2016    SJOURN SYNDROM NEW DX & RA    Cancer ([facility] 7/2015    triple (-) breast ca    Chest pain     Per Pt on Rt side and arm pit only, resolves with Tylenol and states she believes it is related to her tumor    Colon polyp     Dizziness     pt states it is vertigo    Echocardiogram normal 2/19/16    Family hx of colon cancer     History of chemotherapy     AUG 2015- JAN 2016    Ill-defined condition     HAD CHEMO LAST JAN 2016    Ill-defined condition     rhematoid arthritis,sjogren syndrome    Nephrotic syndrome 3/12/2011    biopsy inconclusive but steroid responsive    TMJ (temporomandibular joint disorder)     Toothache     being treated for tooth ache x 1 week. pain better with treatment      Social History     Socioeconomic History    Marital status:      Spouse name: Not on file    Number of children: Not on file    Years of education: Not on file    Highest education level: Not on file   Tobacco Use    Smoking status: Never Smoker    Smokeless tobacco: Never Used   Substance and Sexual Activity    Alcohol use: No    Drug use: No    Sexual activity: Yes     Partners: Male     Birth control/protection: None     Social Determinants of Health     Financial Resource Strain:     Difficulty of Paying Living Expenses:    Food Insecurity:     Worried About Running Out of Food in the Last Year:     [address] in the Last Year:    Transportation Needs:     Lack of Transportation (Medical):  Lack of Transportation (Non-Medical):    Physical Activity:     Days of Exercise per Week:     Minutes of Exercise per Session:    Stress:     Feeling of Stress :    Social Connections:     Frequency of Communication with Friends and Family:     Frequency of Social Gatherings with Friends and Family:     Attends [employer] Services:     Active Member of Clubs or Organizations:     Attends Club or Organization Meetings:     Marital Status:           Current Outpatient Medications   Medication Sig    valsartan (DIOVAN) 80 mg tablet valsartan 80 mg tablet   Take 1 tablet every day by oral route.  loratadine (CLARITIN) 10 mg tablet Take 10 mg by mouth. No current facility-administered medications for this visit.        Objective: Vitals:    10/29/21 1725   Pulse: 63   Resp: 16   Temp: 98.6 °F (37 °C)   SpO2: 97%       Physical Exam  General appearance  appears well hydrated and does not appear toxic, no acute distress  Eyes - EOMs intact. Non injected. No scleral icterus   Ears - no external swelling. TMs normal bilat  Nose - nasal congestion. No purulent drainage  Mouth - OP clear without swelling, exudate or lesion. Mucus membranes moist. Uvula midline. Neck/Lymphatics  trachea midline, full AROM  Chest - Normal breathing effort no wheeze rales, rhonchi or diminishments bilaterally. Heart - RRR, no murmurs  Skin - no observable rashes or pallor  Neurologic- alert and oriented x 3  Psychiatric- normal mood, behavior and though content. Assessment/ Plan:     1. Nasal congestion    - AMB POC SARS-COV-2    2. Sneezing    - AMB POC SARS-COV-2    Possible allergies or early viral illness  No evidence suggesting complication of illness at this time. Will discharge home with close monitoring and follow up. Rapid covid 19 test NEGATIVE  Start xyzal OTC for 2 weeks  Supportive home care for mild symptoms advised- maintain adequate fluid intake, over the counter Tylenol (for fever, aches, pains, chills), deep breathing exercises  Recommendation for self isolation/quarantine based on current CDC guidelines      Test Results:  Recent Results (from the past 6 hour(s))   AMB POC SARS-COV-2    Collection Time: 10/29/21  5:44 PM   Result Value Ref Range    SARS-COV-2 POC Negative Negative       Follow up:   We have reviewed worsening/concerning signs and symptoms that may warrant seeking immediate care     Kiko Cedeno NP

## 2021-11-04 ENCOUNTER — TELEPHONE (OUTPATIENT)
Dept: INTERNAL MEDICINE CLINIC | Age: 61
End: 2021-11-04

## 2021-11-04 DIAGNOSIS — Z13.220 SCREENING FOR HYPERLIPIDEMIA: Primary | ICD-10-CM

## 2021-11-04 NOTE — TELEPHONE ENCOUNTER
----- Message from Aidee Estrada sent at 11/4/2021 10:50 AM EDT -----  Subject: Message to Provider    QUESTIONS  Information for Provider? Patient wants order in to have cholesterol   checked before the end of year for Ins. Have appt in Jan. Please call her   when order is in.  ---------------------------------------------------------------------------  --------------  CALL BACK INFO  What is the best way for the office to contact you? OK to leave message on   voicemail, OK to respond with electronic message via [employer] portal (only   for patients who have registered [employer] account)  Preferred Call Back Phone Number? [phone]  ---------------------------------------------------------------------------  --------------  SCRIPT ANSWERS  Relationship to Patient?  Self

## 2021-11-05 ENCOUNTER — APPOINTMENT (OUTPATIENT)
Dept: INTERNAL MEDICINE CLINIC | Age: 61
End: 2021-11-05

## 2021-11-06 LAB
CHOLEST SERPL-MCNC: 226 MG/DL (ref 100–199)
HDLC SERPL-MCNC: 64 MG/DL
LDLC SERPL CALC-MCNC: 146 MG/DL (ref 0–99)
TRIGL SERPL-MCNC: 93 MG/DL (ref 0–149)
VLDLC SERPL CALC-MCNC: 16 MG/DL (ref 5–40)

## 2022-01-05 ENCOUNTER — OFFICE VISIT (OUTPATIENT)
Dept: INTERNAL MEDICINE CLINIC | Age: 62
End: 2022-01-05
Payer: COMMERCIAL

## 2022-01-05 VITALS
WEIGHT: 155 LBS | HEART RATE: 65 BPM | RESPIRATION RATE: 16 BRPM | TEMPERATURE: 97.7 F | OXYGEN SATURATION: 97 % | HEIGHT: 68 IN | BODY MASS INDEX: 23.49 KG/M2 | DIASTOLIC BLOOD PRESSURE: 65 MMHG | SYSTOLIC BLOOD PRESSURE: 102 MMHG

## 2022-01-05 DIAGNOSIS — Z00.00 ROUTINE MEDICAL EXAM: Primary | ICD-10-CM

## 2022-01-05 DIAGNOSIS — M35.00 SJOGREN'S SYNDROME, WITH UNSPECIFIED ORGAN INVOLVEMENT (HCC): ICD-10-CM

## 2022-01-05 DIAGNOSIS — Z85.3 HISTORY OF BREAST CANCER: ICD-10-CM

## 2022-01-05 DIAGNOSIS — N05.2 MEMBRANOUS GLOMERULONEPHRITIS: ICD-10-CM

## 2022-01-05 DIAGNOSIS — Z23 NEEDS FLU SHOT: ICD-10-CM

## 2022-01-05 DIAGNOSIS — M77.01 MEDIAL EPICONDYLITIS OF RIGHT ELBOW: ICD-10-CM

## 2022-01-05 PROCEDURE — 90686 IIV4 VACC NO PRSV 0.5 ML IM: CPT | Performed by: FAMILY MEDICINE

## 2022-01-05 PROCEDURE — 90471 IMMUNIZATION ADMIN: CPT | Performed by: FAMILY MEDICINE

## 2022-01-05 PROCEDURE — 99396 PREV VISIT EST AGE 40-64: CPT | Performed by: FAMILY MEDICINE

## 2022-01-05 RX ORDER — DENOSUMAB 60 MG/ML
1 INJECTION SUBCUTANEOUS
COMMUNITY

## 2022-01-05 RX ORDER — VALSARTAN 160 MG/1
TABLET ORAL 2 TIMES DAILY
COMMUNITY

## 2022-01-05 RX ORDER — GUAIFENESIN 100 MG/5ML
81 LIQUID (ML) ORAL DAILY
COMMUNITY

## 2022-01-05 NOTE — PATIENT INSTRUCTIONS
Golfer's Elbow: Care Instructions  Your Care Instructions  The pain and soreness in the inner part of your elbow is caused by a problem called golfer's elbow. Bending the wrist over and over again has hurt the tendons that attach to your inner elbow. The muscles in your forearm also may hurt. Golfer's elbow usually gets better with treatment at home. Follow-up care is a key part of your treatment and safety. Be sure to make and go to all appointments, and call your doctor if you are having problems. It's also a good idea to know your test results and keep a list of the medicines you take. How can you care for yourself at home? · Rest your elbow and wrist. Try to avoid movements that are painful. You may have to do this for weeks to months. Follow your doctor's directions for how long to rest.  · Put ice or a cold pack on your elbow for 10 to 20 minutes at a time. Try to do this every 1 to 2 hours for the next 3 days (when you are awake) or until the swelling goes down. Put a thin cloth between the ice and your skin. · Prop up the sore arm on a pillow when you ice it or anytime you sit or lie down during the next 3 days. Try to keep it above the level of your heart. This will help reduce swelling. · Take pain medicine exactly as directed. ? If the doctor gave you a prescription medicine for pain, take it as prescribed. ? If you are not taking a prescription pain medicine, ask your doctor if you can take an over-the-counter medicine. · If your doctor gave you a brace or splint, use it as directed. A \"counterforce\" brace is a strap around the forearm, just below the elbow. It may ease the pressure on the tendon and may spread force throughout the arm. · Follow your doctor's or physical therapist's directions for exercise. To prevent golfer's elbow  · After your elbow has healed, learn the best techniques for your work or sport. A physical or occupational therapist can help you.   When should you call for help?   Call your doctor now or seek immediate medical care if:    · Your pain gets worse.     · You cannot bend your elbow normally.     · You have tingling, weakness, or numbness in your hand and fingers.     · Your arm or hand is cool or pale or changes color. Watch closely for changes in your health, and be sure to contact your doctor if:    · You have work problems caused by your elbow pain.     · Your pain is not better after 2 weeks. Where can you learn more? Go to http://www.gray.com/  Enter G211 in the search box to learn more about \"Golfer's Elbow: Care Instructions. \"  Current as of: July 1, 2021               Content Version: 13.0  © 9861-5754 [employer]. Care instructions adapted under license by [employer] (which disclaims liability or warranty for this information). If you have questions about a medical condition or this instruction, always ask your healthcare professional. [address] any warranty or liability for your use of this information. Golfer's Elbow: Exercises  Introduction  Here are some examples of exercises for you to try. The exercises may be suggested for a condition or for rehabilitation. Start each exercise slowly. Ease off the exercises if you start to have pain. You will be told when to start these exercises and which ones will work best for you. How to do the exercises  Wrist extensor stretch    1. Extend your affected arm in front of you and make a fist with your palm facing down. 2. Bend your wrist so that your fist points toward the floor. 3. With your other hand, gently bend your wrist farther until you feel a mild to moderate stretch in your forearm. 4. Hold for at least 15 to 30 seconds. 5. Repeat 2 to 4 times. 6. Repeat steps 1 through 5 with your fingers pointing toward the floor. Forearm extensor stretch    1.  Place your affected elbow down at your side, bent at about 90 degrees. Then make a fist with your palm facing down. 2. Keeping your wrist bent, slowly straighten your elbow so your arm is down at your side. Then twist your fist out so your palm is facing out to the side and you feel a stretch. 3. Hold for at least 15 to 30 seconds. 4. Repeat 2 to 4 times. Wrist flexor stretch    1. Extend your affected arm in front of you with your palm facing away from your body. 2. Bend back your wrist, pointing your hand up toward the ceiling. 3. With your other hand, gently bend your wrist farther until you feel a mild to moderate stretch in your forearm. 4. Hold for at least 15 to 30 seconds. 5. Repeat 2 to 4 times. 6. Repeat steps 1 through 5, but this time extend your affected arm in front of you with your palm facing up. Then bend back your wrist, pointing your hand toward the floor. Wrist curls    1. Place your forearm on a table with your hand hanging over the edge of the table, palm up. 2. Place a 1- to 2-pound weight in your hand. This may be a dumbbell, a can of food, or a filled water bottle. 3. Slowly raise and lower the weight while keeping your forearm on the table and your palm facing up. 4. Repeat this motion 8 to 12 times. 5. Switch arms, and do steps 1 through 4.  6. Repeat with your hand facing down toward the floor. Switch arms. Resisted wrist extension    1. Sit leaning forward with your legs slightly spread. Then place your affected forearm on your thigh with your hand and wrist in front of your knee. 2. Grasp one end of an exercise band with your palm down, and step on the other end.  3. Slowly bend your wrist upward for a count of 2, then lower your wrist slowly to a count of 5.  4. Repeat 8 to 12 times. Resisted wrist flexion    1. Sit leaning forward with your legs slightly spread. Then place your affected forearm on your thigh with your hand and wrist in front of your knee.   2. Grasp one end of an exercise band with your palm up, and step on the other end.  3. Slowly bend your wrist upward for a count of 2, then lower your wrist slowly to a count of 5.  4. Repeat 8 to 12 times. Neck stretch to the side    1. This stretch works best if you keep your shoulder down as you lean away from it. To help you remember to do this, start by relaxing your shoulders and lightly holding on to your thighs or your chair. 2. Tilt your head away from your affected elbow and toward your opposite shoulder. For example, if your right elbow is sore, keep your right shoulder down as you lean your head toward your left shoulder. 3. Hold for 15 to 30 seconds. Let the weight of your head stretch your muscles. 4. If you would like a little added stretch, use your hand to gently and steadily pull your head toward your shoulder. For example, if your right elbow is sore, use your left hand to gently pull your head toward your left shoulder. 5. Repeat 2 to 4 times. Resisted forearm pronation    1. Sit leaning forward with your legs slightly spread. Then place your affected forearm on your thigh with your hand and wrist in front of your knee. 2. Grasp one end of an exercise band with your palm up, and step on the other end. 3. Keeping your wrist straight, roll your palm inward toward your thigh for a count of 2, then slowly move your wrist back to the starting position to a count of 5.  4. Repeat 8 to 12 times. Resisted supination    1. Sit leaning forward with your legs slightly spread. Then place your affected forearm on your thigh with your hand and wrist in front of your knee. 2. Grasp one end of an exercise band with your palm down, and step on the other end. 3. Keeping your wrist straight, roll your palm outward and away from your thigh for a count of 2, then slowly move your wrist back to the starting position to a count of 5.  4. Repeat 8 to 12 times. Follow-up care is a key part of your treatment and safety.  Be sure to make and go to all appointments, and call your doctor if you are having problems. It's also a good idea to know your test results and keep a list of the medicines you take. Where can you learn more? Go to http://www.gray.com/  Enter H459 in the search box to learn more about \"Golfer's Elbow: Exercises. \"  Current as of: July 1, 2021               Content Version: 13.0  © 2006-2021 [employer]. Care instructions adapted under license by [employer] (which disclaims liability or warranty for this information). If you have questions about a medical condition or this instruction, always ask your healthcare professional. [address] any warranty or liability for your use of this information.

## 2022-01-12 ENCOUNTER — OFFICE VISIT (OUTPATIENT)
Dept: SURGERY | Age: 62
End: 2022-01-12
Payer: COMMERCIAL

## 2022-01-12 DIAGNOSIS — Z90.13 HISTORY OF BILATERAL MASTECTOMY: Primary | ICD-10-CM

## 2022-01-12 PROCEDURE — 99213 OFFICE O/P EST LOW 20 MIN: CPT | Performed by: SURGERY

## 2022-01-12 RX ORDER — CALCIUM CARBONATE/VITAMIN D3 500 MG-10
TABLET ORAL
COMMUNITY
End: 2022-05-25

## 2022-01-12 NOTE — PROGRESS NOTES
HISTORY OF PRESENT ILLNESS  Luz Nicolas is a 63 y.o. female. HPI ESTABLISHED patient here for follow up of RIGHT breast cancer. Patient complains of pain that radiates from her elbow up her right arm. She states that it has changed from intermittent pain to constant over time. Otherwise, patient is doing well with no other concerns. Referring - Dr. Jane Hooks - [address] breast IDC T2N0 triple negative  Completed neoadjuvant chemotherapy. Followed by Dr. Alayna Varma (originally 3.5 cm, 1.8 cm post neoadjuvant chemo)  2/23/16- BILATERAL mastectomy with reconstruction, RIGHT SLNB and port removal - Dr. Jonathan Luna  No radiation. 5/2016- BILATERAL implants placed by Dr. Christy Del Angel. 12/2016- BILATERAL revision breast reconstruction.       No family history of breast or ovarian cancer. Patient has VUS BRCA 1 ([place]). Review of Systems   All other systems reviewed and are negative. Physical Exam  Vitals and nursing note reviewed. Chest:          Comments: Bilateral implants intact no masses  No adenopathy        ASSESSMENT and PLAN    ICD-10-CM ICD-9-CM    1. History of bilateral mastectomy  Z90.13 V45.71    Referring - Dr. Jane Hooks - [address] breast IDC T2N0 triple negative  Completed neoadjuvant chemotherapy. Followed by Dr. Alayna Varma (originally 3.5 cm, 1.8 cm post neoadjuvant chemo)  2/23/16- BILATERAL mastectomy with reconstruction, RIGHT SLNB and port removal - Dr. Jonathan Luna  No radiation. 5/2016- BILATERAL implants placed by Dr. Christy Del Angel. 12/2016- BILATERAL revision breast reconstruction.       No family history of breast or ovarian cancer. Patient has VUS BRCA 1 ([place]). - no evidence local recurrence  - f/u in 1 year with NP   20 minutes was spent with patient on counseling and coordination of care.

## 2022-01-12 NOTE — PATIENT INSTRUCTIONS
Breast Cancer: Care Instructions  Your Care Instructions     Breast cancer occurs when abnormal cells grow out of control in the breast. These cancer cells can spread within the breast, to nearby lymph nodes and other tissues, and to other parts of the body. Being treated for cancer can weaken your body, and you may feel very tired. Get the rest your body needs so you can feel better. Finding out that you have cancer is scary. You may feel many emotions and may need some help coping. Seek out family, friends, and counselors for support. You also can do things at home to make yourself feel better while you go through treatment. Call the [employer] (4-339.665.7858) or visit its website at [employer] for more information. Follow-up care is a key part of your treatment and safety. Be sure to make and go to all appointments, and call your doctor if you are having problems. It's also a good idea to know your test results and keep a list of the medicines you take. How can you care for yourself at home? · Take your medicines exactly as prescribed. Call your doctor if you think you are having a problem with your medicine. You may get medicine for nausea and vomiting if you have these side effects. · Follow your doctor's instructions to relieve pain. Pain from cancer and surgery can almost always be controlled. Use pain medicine when you first notice pain, before it becomes severe. · Eat healthy food. If you do not feel like eating, try to eat food that has protein and extra calories to keep up your strength and prevent weight loss. Drink liquid meal replacements for extra calories and protein. Try to eat your main meal early. · Get some physical activity every day, but do not get too tired. Keep doing the hobbies you enjoy as your energy allows. · Do not smoke. Smoking can make your cancer worse. If you need help quitting, talk to your doctor about stop-smoking programs and medicines.  These can increase your chances of quitting for good. · Take steps to control your stress and workload. Learn relaxation techniques. ? Share your feelings. Stress and tension affect our emotions. By expressing your feelings to others, you may be able to understand and cope with them. ? Consider joining a support group. Talking about a problem with your spouse, a good friend, or other people with similar problems is a good way to reduce tension and stress. ? Express yourself through art. Try writing, crafts, dance, or art to relieve stress. Some dance, writing, or art groups may be available just for people who have cancer. ? Be kind to your body and mind. Getting enough sleep, eating a healthy diet, and taking time to do things you enjoy can contribute to an overall feeling of balance in your life and can help reduce stress. ? Get help if you need it. Discuss your concerns with your doctor or counselor. · If you are vomiting or have diarrhea:  ? Drink plenty of fluids to prevent dehydration. Choose water and other clear liquids. If you have kidney, heart, or liver disease and have to limit fluids, talk with your doctor before you increase the amount of fluids you drink. ? When you are able to eat, try clear soups, mild foods, and liquids until all symptoms are gone for 12 to 48 hours. Other good choices include dry toast, crackers, cooked cereal, and gelatin dessert, such as Jell-O.  · If you have not already done so, prepare a list of advance directives. Advance directives are instructions to your doctor and family members about what kind of care you want if you become unable to speak or express yourself. When should you call for help? Call 911 anytime you think you may need emergency care. For example, call if:    · You passed out (lost consciousness).    Call your doctor now or seek immediate medical care if:    · You have a fever.     · You have abnormal bleeding.     · You think you have an infection.     · You have new or worse pain.     · You have new symptoms, such as a cough, belly pain, vomiting, diarrhea, or a rash. Watch closely for changes in your health, and be sure to contact your doctor if:    · You are much more tired than usual.     · You have swollen glands in your armpits, groin, or neck.     · You do not get better as expected. Where can you learn more? Go to http://www.bean.com/  Enter V321 in the search box to learn more about \"Breast Cancer: Care Instructions. \"  Current as of: December 17, 2020               Content Version: 13.0  © 6273-8965 [email or web address]. Care instructions adapted under license by [employer] (which disclaims liability or warranty for this information). If you have questions about a medical condition or this instruction, always ask your healthcare professional. [address] any warranty or liability for your use of this information.

## 2022-03-18 ENCOUNTER — OFFICE VISIT (OUTPATIENT)
Dept: URGENT CARE | Age: 62
End: 2022-03-18
Payer: COMMERCIAL

## 2022-03-18 VITALS
SYSTOLIC BLOOD PRESSURE: 114 MMHG | RESPIRATION RATE: 14 BRPM | TEMPERATURE: 98.2 F | HEART RATE: 62 BPM | WEIGHT: 157 LBS | HEIGHT: 68 IN | BODY MASS INDEX: 23.79 KG/M2 | OXYGEN SATURATION: 98 % | DIASTOLIC BLOOD PRESSURE: 69 MMHG

## 2022-03-18 DIAGNOSIS — J32.9 SINUSITIS, UNSPECIFIED CHRONICITY, UNSPECIFIED LOCATION: ICD-10-CM

## 2022-03-18 DIAGNOSIS — J06.9 VIRAL UPPER RESPIRATORY ILLNESS: Primary | ICD-10-CM

## 2022-03-18 PROBLEM — R80.9 PROTEINURIA: Status: ACTIVE | Noted: 2020-07-30

## 2022-03-18 LAB — SARS-COV-2 PCR, POC: NEGATIVE

## 2022-03-18 PROCEDURE — 87635 SARS-COV-2 COVID-19 AMP PRB: CPT | Performed by: FAMILY MEDICINE

## 2022-03-18 PROCEDURE — 99213 OFFICE O/P EST LOW 20 MIN: CPT | Performed by: FAMILY MEDICINE

## 2022-03-18 RX ORDER — CETIRIZINE HCL 10 MG
10 TABLET ORAL
COMMUNITY
End: 2022-05-25

## 2022-03-18 RX ORDER — DOXYCYCLINE 100 MG/1
100 TABLET ORAL 2 TIMES DAILY
Qty: 14 TABLET | Refills: 0 | Status: SHIPPED | OUTPATIENT
Start: 2022-03-18 | End: 2022-03-25

## 2022-03-18 NOTE — PROGRESS NOTES
Subjective: (As above and below)     The patient/guardian gave verbal consent to treat. Chief Complaint   Patient presents with    Sinus Infection     Pt here today for sinus congestion, cough, and headache since Monday. Taking Mucinex and hot fluids and Zyrtec     Rapadma Valencia is a 63 y.o. female who presents for evaluation of: sinus infection. Sinus pain, pressure, and nasal congestion with associated tiredness. Symptom onset 4 days ago . Preceding illness: none. No other identified aggravating or alleviating factors. Symptoms are constant and overall not improving. Promotes no decrease in PO intake of fluids. Denies: severe lethargy, SOB, pain with breathing, severe headache, fevers     Known Exposure to COVID-19: no      ROS  Review of Systems - negative except as listed above    Reviewed PmHx, RxHx, FmHx, SocHx, AllgHx and updated in chart.   Family History   Problem Relation Age of Onset   [facility] Cancer Mother         colon cancer    Hypertension Mother     Kidney Disease Mother         dialysis    Alcohol abuse Father     Stroke Sister     Cancer Brother         bone cancer    Anemia Daughter     Diabetes Brother     Heart Surgery Sister         CABG 3 WAY    Heart Attack Sister     No Known Problems Brother     Anesth Problems Son     Delayed Awakening Son        Past Medical History:   Diagnosis Date    ARF (acute renal failure) ([address] 3/12/11    as of 8/11/15 pt states has resolved and is not currently being folllowed by specialist    Autoimmune disease ([address] 2016    SJOURN SYNDROM NEW DX & RA    Cancer ([facility] 7/2015    triple (-) breast ca    Chest pain     Per Pt on Rt side and arm pit only, resolves with Tylenol and states she believes it is related to her tumor    Colon polyp     Dizziness     pt states it is vertigo    Echocardiogram normal 2/19/16    Family hx of colon cancer     History of chemotherapy     AUG 2015- JAN 2016    Ill-defined condition     HAD CHEMO LAST JAN 2016    Ill-defined condition     rhematoid arthritis,sjogren syndrome    Nephrotic syndrome 3/12/2011    biopsy inconclusive but steroid responsive    TMJ (temporomandibular joint disorder)     Toothache     being treated for tooth ache x 1 week. pain better with treatment      Social History     Socioeconomic History    Marital status:    Tobacco Use    Smoking status: Never Smoker    Smokeless tobacco: Never Used   Vaping Use    Vaping Use: Never used   Substance and Sexual Activity    Alcohol use: No    Drug use: No    Sexual activity: Yes     Partners: Male     Birth control/protection: None          Current Outpatient Medications   Medication Sig    cetirizine (ZyrTEC) 10 mg tablet Take 10 mg by mouth.  doxycycline (ADOXA) 100 mg tablet Take 1 Tablet by mouth two (2) times a day for 7 days.  Calcium-Cholecalciferol, D3, 500 mg-10 mcg (400 unit) tab Take  by mouth.  valsartan (DIOVAN) 160 mg tablet valsartan 160 mg tablet   Take 1 tablet every day by oral route.  denosumab (Prolia) 60 mg/mL injection 1 mL.  aspirin 81 mg chewable tablet Take 81 mg by mouth daily. No current facility-administered medications for this visit. Objective:     Vitals:    03/18/22 1730   BP: 114/69   Pulse: 62   Resp: 14   Temp: 98.2 °F (36.8 °C)   SpO2: 98%   Weight: 157 lb (71.2 kg)   Height: 5' 8\" (1.727 m)       Physical Exam  General appearance  appears well hydrated and does not appear toxic, no acute distress  Eyes - EOMs intact. Non injected. No scleral icterus   Ears - no external swelling  Nose - nasal congestion, edematous nasal passages bilat. No purulent drainage  Mouth - OP clear without swelling, exudate or lesion. Mucus membranes moist. Uvula midline. Neck/Lymphatics  trachea midline, full AROM  Chest - Normal breathing effort no wheeze rales, rhonchi or diminishments bilaterally.   Heart - RRR, no murmurs  Skin - no observable rashes or pallor  Neurologic- alert and oriented x 3  Psychiatric- normal mood, behavior and though content. Assessment/ Plan:     1. Viral upper respiratory illness    - POCT COVID-19, SARS-COV-2, PCR    2. Sinusitis, unspecified chronicity, unspecified location    - POCT COVID-19, SARS-COV-2, PCR  - doxycycline (ADOXA) 100 mg tablet; Take 1 Tablet by mouth two (2) times a day for 7 days. Dispense: 14 Tablet; Refill: 0      Covid 19 test result today NEGATIVE  Likely viral sinusitis; advised holding antibiotic for 3-4 days to see if improves as pain is likely from viral swelling and not bacterial infection at this time.   Supportive home care for mild symptoms advised- maintain adequate fluid intake, over the counter Tylenol (for fever, aches, pains, chills), mucinex, flonase        Test Results:  Recent Results (from the past 6 hour(s))   POCT COVID-19, SARS-COV-2, PCR    Collection Time: 03/18/22  5:57 PM   Result Value Ref Range    SARS-COV-2 PCR, POC Negative Negative       Follow up:  As needed for new, worsening or changes     Konrad Short NP

## 2022-03-19 PROBLEM — N05.2 MEMBRANOUS GLOMERULONEPHRITIS: Status: ACTIVE | Noted: 2020-10-14

## 2022-03-19 PROBLEM — Z85.3 HISTORY OF BREAST CANCER: Status: ACTIVE | Noted: 2020-10-14

## 2022-05-03 ENCOUNTER — TELEPHONE (OUTPATIENT)
Dept: INTERNAL MEDICINE CLINIC | Age: 62
End: 2022-05-03

## 2022-05-03 NOTE — TELEPHONE ENCOUNTER
Patient called on the triage line experiencing dizziness and nausea. pt states she is also experiencing pain on right side of jaw/ear almost like an ear ache. Patient does not want to go to UC. This is something that is happening every week.

## 2022-05-25 ENCOUNTER — OFFICE VISIT (OUTPATIENT)
Dept: INTERNAL MEDICINE CLINIC | Age: 62
End: 2022-05-25
Payer: COMMERCIAL

## 2022-05-25 VITALS
OXYGEN SATURATION: 100 % | DIASTOLIC BLOOD PRESSURE: 71 MMHG | TEMPERATURE: 97.2 F | WEIGHT: 160 LBS | SYSTOLIC BLOOD PRESSURE: 115 MMHG | BODY MASS INDEX: 24.25 KG/M2 | RESPIRATION RATE: 16 BRPM | HEART RATE: 64 BPM | HEIGHT: 68 IN

## 2022-05-25 DIAGNOSIS — E55.9 VITAMIN D DEFICIENCY: ICD-10-CM

## 2022-05-25 DIAGNOSIS — M06.9 RHEUMATOID ARTHRITIS, INVOLVING UNSPECIFIED SITE, UNSPECIFIED WHETHER RHEUMATOID FACTOR PRESENT (HCC): ICD-10-CM

## 2022-05-25 DIAGNOSIS — H81.90 VESTIBULAR DIZZINESS: Primary | ICD-10-CM

## 2022-05-25 DIAGNOSIS — E78.00 PURE HYPERCHOLESTEROLEMIA: ICD-10-CM

## 2022-05-25 DIAGNOSIS — M47.812 CERVICAL SPONDYLOSIS: ICD-10-CM

## 2022-05-25 PROCEDURE — 99214 OFFICE O/P EST MOD 30 MIN: CPT | Performed by: FAMILY MEDICINE

## 2022-05-25 RX ORDER — MELATONIN
DAILY
COMMUNITY

## 2022-05-25 RX ORDER — MINERAL OIL
ENEMA (ML) RECTAL
COMMUNITY

## 2022-05-25 RX ORDER — MECLIZINE HYDROCHLORIDE 25 MG/1
12.5-25 TABLET ORAL
Qty: 30 TABLET | Refills: 1 | Status: SHIPPED | OUTPATIENT
Start: 2022-05-25

## 2022-05-25 NOTE — PATIENT INSTRUCTIONS
Office Policies    Phone calls/patient messages:            Please allow up to 24 hours for someone in the office to contact you about your call or message. Be mindful your provider may be out of the office or your message may require further review. We encourage you to use [employer] for your messages as this is a faster, more efficient way to communicate with our office                         Medication Refills:            Prescription medications require 48-72 business hours to process. We encourage you to use [employer] for your refills. For controlled medications: Please allow 72 business hours to process. Certain medications may require you to  a written prescription at our office. NO narcotic/controlled medications will be prescribed after 4pm Monday through Friday or on weekends              Form/Paperwork Completion:            Please note a $25 fee may incur for all paperwork for completed by our providers. We ask that you allow 7-10 business days. Pre-payment is due prior to picking up/faxing the completed form. You may also download your forms to [employer] to have your doctor print off. [facility] ON YOU TUBE. CERVICAL RADICULOPATHY    CHECK BLOOD PRESSURE AT HOME.

## 2022-05-25 NOTE — PROGRESS NOTES
\"Have you been to the ER, urgent care clinic since your last visit? Hospitalized since your last visit? \" Yes When: 03/2022 Where: [facility] Reason for visit: Sinus /Covid testing    2. \"Have you seen or consulted any other health care providers outside of the [address] [place] [address] since your last visit? \" No     3. For patients aged 39-70: Has the patient had a colonoscopy / FIT/ Cologuard? Yes - Care Gap present. Most recent result on file      If the patient is female:    4. For patients aged 41-77: Has the patient had a mammogram within the past 2 years? Yes - Care Gap present. Most recent result on file      5. For patients aged 21-65: Has the patient had a pap smear? Yes - Care Gap present.  Most recent result on file

## 2022-05-26 ENCOUNTER — APPOINTMENT (OUTPATIENT)
Dept: INTERNAL MEDICINE CLINIC | Age: 62
End: 2022-05-26

## 2022-05-27 LAB
25(OH)D3+25(OH)D2 SERPL-MCNC: 38.2 NG/ML (ref 30–100)
ALBUMIN SERPL-MCNC: 3.6 G/DL (ref 3.8–4.9)
ALBUMIN/GLOB SERPL: 1 {RATIO} (ref 1.2–2.2)
ALP SERPL-CCNC: 49 IU/L (ref 44–121)
ALT SERPL-CCNC: 22 IU/L (ref 0–32)
AST SERPL-CCNC: 29 IU/L (ref 0–40)
BILIRUB SERPL-MCNC: 0.5 MG/DL (ref 0–1.2)
BUN SERPL-MCNC: 14 MG/DL (ref 8–27)
BUN/CREAT SERPL: 18 (ref 12–28)
C3 SERPL-MCNC: 146 MG/DL (ref 82–167)
C4 SERPL-MCNC: 25 MG/DL (ref 12–38)
CALCIUM SERPL-MCNC: 9.9 MG/DL (ref 8.7–10.3)
CHLORIDE SERPL-SCNC: 103 MMOL/L (ref 96–106)
CHOLEST SERPL-MCNC: 213 MG/DL (ref 100–199)
CO2 SERPL-SCNC: 21 MMOL/L (ref 20–29)
CREAT SERPL-MCNC: 0.77 MG/DL (ref 0.57–1)
CRP SERPL-MCNC: <1 MG/L (ref 0–10)
EGFR: 88 ML/MIN/1.73
ERYTHROCYTE [DISTWIDTH] IN BLOOD BY AUTOMATED COUNT: 12.7 % (ref 11.7–15.4)
ERYTHROCYTE [SEDIMENTATION RATE] IN BLOOD BY WESTERGREN METHOD: 20 MM/HR (ref 0–40)
GLOBULIN SER CALC-MCNC: 3.6 G/DL (ref 1.5–4.5)
GLUCOSE SERPL-MCNC: 85 MG/DL (ref 65–99)
HCT VFR BLD AUTO: 33.7 % (ref 34–46.6)
HDLC SERPL-MCNC: 66 MG/DL
HGB BLD-MCNC: 11.3 G/DL (ref 11.1–15.9)
LDLC SERPL CALC-MCNC: 136 MG/DL (ref 0–99)
MCH RBC QN AUTO: 30.6 PG (ref 26.6–33)
MCHC RBC AUTO-ENTMCNC: 33.5 G/DL (ref 31.5–35.7)
MCV RBC AUTO: 91 FL (ref 79–97)
PLATELET # BLD AUTO: 233 X10E3/UL (ref 150–450)
POTASSIUM SERPL-SCNC: 4.2 MMOL/L (ref 3.5–5.2)
PROT SERPL-MCNC: 7.2 G/DL (ref 6–8.5)
RBC # BLD AUTO: 3.69 X10E6/UL (ref 3.77–5.28)
SODIUM SERPL-SCNC: 138 MMOL/L (ref 134–144)
TRIGL SERPL-MCNC: 63 MG/DL (ref 0–149)
TSH SERPL DL<=0.005 MIU/L-ACNC: 1.59 UIU/ML (ref 0.45–4.5)
VLDLC SERPL CALC-MCNC: 11 MG/DL (ref 5–40)
WBC # BLD AUTO: 4.3 X10E3/UL (ref 3.4–10.8)

## 2022-06-20 ENCOUNTER — PATIENT MESSAGE (OUTPATIENT)
Dept: INTERNAL MEDICINE CLINIC | Age: 62
End: 2022-06-20

## 2022-07-02 ENCOUNTER — APPOINTMENT (OUTPATIENT)
Dept: GENERAL RADIOLOGY | Age: 62
End: 2022-07-02
Attending: PHYSICIAN ASSISTANT
Payer: COMMERCIAL

## 2022-07-02 ENCOUNTER — HOSPITAL ENCOUNTER (EMERGENCY)
Age: 62
Discharge: HOME | End: 2022-07-02
Attending: EMERGENCY MEDICINE
Payer: COMMERCIAL

## 2022-07-02 VITALS
SYSTOLIC BLOOD PRESSURE: 126 MMHG | WEIGHT: 157.63 LBS | RESPIRATION RATE: 14 BRPM | HEIGHT: 68 IN | OXYGEN SATURATION: 99 % | HEART RATE: 63 BPM | BODY MASS INDEX: 23.89 KG/M2 | TEMPERATURE: 98.2 F | DIASTOLIC BLOOD PRESSURE: 71 MMHG

## 2022-07-02 DIAGNOSIS — M54.41 ACUTE RIGHT-SIDED LOW BACK PAIN WITH RIGHT-SIDED SCIATICA: Primary | ICD-10-CM

## 2022-07-02 LAB
APPEARANCE UR: CLEAR
BACTERIA URNS QL MICRO: NEGATIVE /HPF
BILIRUB UR QL: NEGATIVE
COLOR UR: NORMAL
EPITH CASTS URNS QL MICRO: NORMAL /LPF
GLUCOSE UR STRIP.AUTO-MCNC: NEGATIVE MG/DL
HGB UR QL STRIP: NEGATIVE
HYALINE CASTS URNS QL MICRO: NORMAL /LPF (ref 0–2)
KETONES UR QL STRIP.AUTO: NEGATIVE MG/DL
LEUKOCYTE ESTERASE UR QL STRIP.AUTO: NEGATIVE
NITRITE UR QL STRIP.AUTO: NEGATIVE
PH UR STRIP: 6 [PH] (ref 5–8)
PROT UR STRIP-MCNC: NEGATIVE MG/DL
RBC #/AREA URNS HPF: NORMAL /HPF (ref 0–5)
SP GR UR REFRACTOMETRY: 1
UA: UC IF INDICATED,UAUC: NORMAL
UROBILINOGEN UR QL STRIP.AUTO: 0.2 EU/DL (ref 0.2–1)
WBC URNS QL MICRO: NORMAL /HPF (ref 0–4)

## 2022-07-02 PROCEDURE — 72100 X-RAY EXAM L-S SPINE 2/3 VWS: CPT

## 2022-07-02 PROCEDURE — 81001 URINALYSIS AUTO W/SCOPE: CPT

## 2022-07-02 PROCEDURE — 99284 EMERGENCY DEPT VISIT MOD MDM: CPT

## 2022-07-02 RX ORDER — HYDROCODONE BITARTRATE AND ACETAMINOPHEN 5; 325 MG/1; MG/1
1 TABLET ORAL
Qty: 9 TABLET | Refills: 0 | Status: SHIPPED | OUTPATIENT
Start: 2022-07-02 | End: 2022-07-05

## 2022-07-02 RX ORDER — CYCLOBENZAPRINE HCL 5 MG
5 TABLET ORAL
Qty: 20 TABLET | Refills: 0 | Status: SHIPPED | OUTPATIENT
Start: 2022-07-02

## 2022-07-02 RX ORDER — PREDNISONE 10 MG/1
TABLET ORAL
Qty: 21 TABLET | Refills: 0 | Status: SHIPPED | OUTPATIENT
Start: 2022-07-02

## 2022-07-02 NOTE — ED PROVIDER NOTES
EMERGENCY DEPARTMENT HISTORY AND PHYSICAL EXAM      Date: 7/2/2022  Patient Name: Angel Bower    History of Presenting Illness     Chief Complaint   Patient presents with    Back Pain     pain lower back on right side radiating down right leg worsening since wednesday. History Provided By: Patient    HPI: Angel Bower, 63 y.o. female with history of acute renal failure, breast cancer and others as below presents ambulatory with her  to the ED with cc of about 3 days of 10 out of 10 constant, achy right lower back pain that radiates slightly down the buttocks. She denies any specific injury. She tells me on Wednesday she noticed she had some right-sided lower back pain. She tells me she thought she may be the plated so she increased her fiber and corrected that problem, however her back pain persists. Denies any diarrhea. She denies any urinary symptoms such as urgency, frequency or dysuria. There are no other complaints, changes, or physical findings at this time. PCP: Ara Ray MD    Current Outpatient Medications   Medication Sig Dispense Refill    predniSONE (STERAPRED DS) 10 mg dose pack Per Dose Pack instructions 21 Tablet 0    HYDROcodone-acetaminophen (NORCO) 5-325 mg per tablet Take 1 Tablet by mouth every eight (8) hours as needed for Pain for up to 3 days. Max Daily Amount: 3 Tablets. 9 Tablet 0    cyclobenzaprine (FLEXERIL) 5 mg tablet Take 1 Tablet by mouth three (3) times daily as needed for Muscle Spasm(s) (low back pain). 20 Tablet 0    fexofenadine (ALLEGRA) 180 mg tablet Take  by mouth.  cholecalciferol (VITAMIN D3) (1000 Units /25 mcg) tablet Take  by mouth daily.  meclizine (ANTIVERT) 25 mg tablet Take 0.5-1 Tablets by mouth three (3) times daily as needed for Dizziness. 30 Tablet 1    valsartan (DIOVAN) 160 mg tablet two (2) times a day.  denosumab (Prolia) 60 mg/mL injection 1 mL.       aspirin 81 mg chewable tablet Take 81 mg by mouth daily. Past History     Past Medical History:  Past Medical History:   Diagnosis Date    [facility]F (acute renal failure) [address] [address] [address] 3/12/11    as of 8/11/15 pt states has resolved and is not currently being folllowed by specialist    Autoimmune disease ([facility] 2016    SJOURN SYNDROM NEW DX & RA    Cancer ([facility] [address] [facility] 7/2015    triple (-) breast ca    Chest pain     Per Pt on Rt side and arm pit only, resolves with Tylenol and states she believes it is related to her tumor    Colon polyp     Dizziness     pt states it is vertigo    Echocardiogram normal 2/19/16    Family hx of colon cancer     History of chemotherapy     AUG 2015- JAN 2016    Ill-defined condition     HAD CHEMO LAST JAN 2016    Ill-defined condition     rhematoid arthritis,sjogren syndrome    Nephrotic syndrome 3/12/2011    biopsy inconclusive but steroid responsive    TMJ (temporomandibular joint disorder)     Toothache     being treated for tooth ache x 1 week.  pain better with treatment       Past Surgical History:  Past Surgical History:   Procedure Laterality Date    ENDOSCOPY, COLON, DIAGNOSTIC  12/12/11    Dr. Nancy Venegas Right 7/2015    HX BREAST RECONSTRUCTION Bilateral 2/23/2016    BREAST RECONSTRUCTION performed by Shoaib Gomez MD at [address] HX BREAST RECONSTRUCTION Bilateral 5/23/2016    REMOVAL TISSUE EXPANDERS BILATERAL BREAST / RECONSTRUCTION BILATERAL BREASTS / GEL IMPLANTS  performed by Shoaib Gomez MD at [address] HX BREAST RECONSTRUCTION Bilateral 12/19/2016    REVISION BILATERAL BREAST RECONSTRUCTION WITH EXCISION OF EXCESS TISSUE / LATERAL BREAST BILATERAL NIPPLE RECONSTRUCTION WITH FTSG  performed by Shoaib Gomez MD at [address] HX GI      COLONOSCOPY    HX GI      ENDOSCOPY    HX GYN      uterine ablation    HX MASTECTOMY Bilateral 2/23/2016    BILATERAL BREAST MASTECTOMY, BILATERAL BREAST RECONSTRUCTION WITH TISSUE EXPANDERS AFTER MASTECTOMIES REMOVE PORT LEFT CHEST performed by Cary Hernández MD at [address] HX TONSILLECTOMY      HX TUBAL LIGATION      HX VASCULAR ACCESS      PORT-A-CATH    HX VASCULAR ACCESS      PORT-A-CATH REMOVAL    [place] BREAST SURGERY PROCEDURE UNLISTED Right 8/13/15    RIGHT axillary SLNB and port a cath insertion       Family History:  Family History   Problem Relation Age of Onset    Cancer Mother         colon cancer    Hypertension Mother     Kidney Disease Mother         dialysis    Alcohol abuse Father     Stroke Sister     Cancer Brother         bone cancer    Anemia Daughter     Diabetes Brother     Heart Surgery Sister         CABG 3 WAY    Heart Attack Sister     No Known Problems Brother     Anesth Problems Son     Delayed Awakening Son        Social History:  Social History     Tobacco Use    Smoking status: Never Smoker    Smokeless tobacco: Never Used   Vaping Use    Vaping Use: Never used   Substance Use Topics    Alcohol use: No    Drug use: No       Allergies: Allergies   Allergen Reactions    Lisinopril Rash and Angioedema    Pcn [Penicillins] Other (comments)     Hallucinations, confusion    Septra [Sulfamethoprim Ds] Rash     Review of Systems   Review of Systems   Constitutional: Negative for fever. Respiratory: Negative for shortness of breath. Cardiovascular: Negative for chest pain. Gastrointestinal: Negative for abdominal pain, constipation, diarrhea, nausea and vomiting. Genitourinary: Negative for dysuria, frequency and urgency. Musculoskeletal: Positive for back pain. Physical Exam   Physical Exam  Vitals and nursing note reviewed. Constitutional:       General: She is not in acute distress. Appearance: She is well-developed. She is not toxic-appearing. HENT:      Head: Normocephalic and atraumatic. Jaw: No trismus.       Right Ear: External ear normal.      Left Ear: External ear normal.      Nose: Nose normal. Mouth/Throat:      Pharynx: Uvula midline. Eyes:      General: No scleral icterus. Conjunctiva/sclera: Conjunctivae normal.      Pupils: Pupils are equal, round, and reactive to light. Cardiovascular:      Rate and Rhythm: Normal rate and regular rhythm. Pulmonary:      Effort: Pulmonary effort is normal. No tachypnea, accessory muscle usage or respiratory distress. Breath sounds: No decreased breath sounds or wheezing. Abdominal:      Palpations: Abdomen is soft. Tenderness: There is no abdominal tenderness. Musculoskeletal:         General: Normal range of motion. Cervical back: Full passive range of motion without pain and normal range of motion. Lumbar back: Tenderness present. Back:       Comments:   LUMBAR SPINE:  No bruising, redness or swelling  Right-sided low back pain that radiates slightly down the buttocks and leg  No lumbar midline tenderness  No palpable step-off  No CVA tenderness   Skin:     Findings: No rash. Neurological:      Mental Status: She is alert and oriented to person, place, and time. She is not disoriented. GCS: GCS eye subscore is 4. GCS verbal subscore is 5. GCS motor subscore is 6. Cranial Nerves: No cranial nerve deficit.    Psychiatric:         Speech: Speech normal.       Diagnostic Study Results     Labs -     Recent Results (from the past 12 hour(s))   URINALYSIS W/ REFLEX CULTURE    Collection Time: 07/02/22  2:28 PM    Specimen: Urine   Result Value Ref Range    Color YELLOW/STRAW      Appearance CLEAR CLEAR      Specific gravity 1.005      pH (UA) 6.0 5.0 - 8.0      Protein Negative NEG mg/dL    Glucose Negative NEG mg/dL    Ketone Negative NEG mg/dL    Bilirubin Negative NEG      Blood Negative NEG      Urobilinogen 0.2 0.2 - 1.0 EU/dL    Nitrites Negative NEG      Leukocyte Esterase Negative NEG      UA:UC IF INDICATED CULTURE NOT INDICATED BY UA RESULT      WBC 0-4 0 - 4 /hpf    RBC 0-5 0 - 5 /hpf    Epithelial cells FEW FEW /lpf    Bacteria Negative NEG /hpf    Hyaline cast 0-2 0 - 2 /lpf       Radiologic Studies -   XR SPINE LUMB 2 OR 3 V   Final Result   No acute abnormality. CT Results  (Last 48 hours)    None        CXR Results  (Last 48 hours)    None        Medical Decision Making   I am the first provider for this patient. I reviewed the vital signs, available nursing notes, past medical history, past surgical history, family history and social history. Vital Signs-Reviewed the patient's vital signs. Patient Vitals for the past 12 hrs:   Temp Pulse Resp BP SpO2   07/02/22 1217 98.2 °F (36.8 °C) 63 14 126/71 99 %       Pulse Oximetry Analysis - 99% on RA    Records Reviewed: Nursing Notes, Old Medical Records, Previous Radiology Studies and Previous Laboratory Studies    Provider Notes (Medical Decision Making):   DDx: Compression fracture, HNP, DDD, sciatica, UTI    ED Course:   Initial assessment performed. The patients presenting problems have been discussed, and they are in agreement with the care plan formulated and outlined with them. I have encouraged them to ask questions as they arise throughout their visit. Disposition:  Discharge    PLAN:  1. Discharge Medication List as of 7/2/2022  3:18 PM      START taking these medications    Details   predniSONE (STERAPRED DS) 10 mg dose pack Per Dose Pack instructions, Normal, Disp-21 Tablet, R-0      HYDROcodone-acetaminophen (NORCO) 5-325 mg per tablet Take 1 Tablet by mouth every eight (8) hours as needed for Pain for up to 3 days. Max Daily Amount: 3 Tablets., Normal, Disp-9 Tablet, R-0      cyclobenzaprine (FLEXERIL) 5 mg tablet Take 1 Tablet by mouth three (3) times daily as needed for Muscle Spasm(s) (low back pain). , Normal, Disp-20 Tablet, R-0         CONTINUE these medications which have NOT CHANGED    Details   fexofenadine (ALLEGRA) 180 mg tablet Take  by mouth., Historical Med      cholecalciferol (VITAMIN D3) (1000 Units /25 mcg) tablet Take  by mouth daily. , Historical Med      meclizine (ANTIVERT) 25 mg tablet Take 0.5-1 Tablets by mouth three (3) times daily as needed for Dizziness., Normal, Disp-30 Tablet, R-1      valsartan (DIOVAN) 160 mg tablet two (2) times a day., Historical Med      denosumab (Prolia) 60 mg/mL injection 1 mL., Historical Med      aspirin 81 mg chewable tablet Take 81 mg by mouth daily. , Historical Med           2. Follow-up Information     Follow up With Specialties Details Why Contact Info    Jena Dooley MD Orthopedic Surgery Call  [facility]: as needed for any concerns [address]  [address]  [address]  [phone]          Return to ED if worse     Diagnosis     Clinical Impression:   1.  Acute right-sided low back pain with right-sided sciatica

## 2022-08-30 ENCOUNTER — ANESTHESIA EVENT (OUTPATIENT)
Dept: ENDOSCOPY | Age: 62
End: 2022-08-30
Payer: COMMERCIAL

## 2022-08-30 NOTE — ANESTHESIA PREPROCEDURE EVALUATION
Anesthetic History   No history of anesthetic complications            Review of Systems / Medical History  Patient summary reviewed, nursing notes reviewed and pertinent labs reviewed    Pulmonary      Recent URI             Neuro/Psych   Within defined limits           Cardiovascular      Valvular problems/murmurs: mitral insufficiency and aortic insufficiency            Exercise tolerance: >4 METS  Comments: rbbb  Ef 60-65%   GI/Hepatic/Renal         Renal disease: ARF       Endo/Other  Within defined limits          Comments: Nephrotic syndrome Other Findings   Comments: sjorens  Breast ca  TMJ  Family Hx of colon Ca  Colon polyps           Physical Exam    Airway  Mallampati: II  TM Distance: > 6 cm  Neck ROM: normal range of motion   Mouth opening: Normal     Cardiovascular    Rhythm: regular  Rate: normal         Dental    Dentition: Implants     Pulmonary  Breath sounds clear to auscultation               Abdominal  GI exam deferred       Other Findings            Anesthetic Plan    ASA: 3  Anesthesia type: total IV anesthesia and MAC          Induction: Intravenous  Anesthetic plan and risks discussed with: Patient

## 2022-08-31 ENCOUNTER — HOSPITAL ENCOUNTER (OUTPATIENT)
Age: 62
Setting detail: OUTPATIENT SURGERY
Discharge: HOME | End: 2022-08-31
Attending: SPECIALIST | Admitting: SPECIALIST
Payer: COMMERCIAL

## 2022-08-31 ENCOUNTER — ANESTHESIA (OUTPATIENT)
Dept: ENDOSCOPY | Age: 62
End: 2022-08-31
Payer: COMMERCIAL

## 2022-08-31 VITALS
HEIGHT: 68 IN | OXYGEN SATURATION: 100 % | SYSTOLIC BLOOD PRESSURE: 106 MMHG | HEART RATE: 56 BPM | WEIGHT: 156 LBS | TEMPERATURE: 97.7 F | BODY MASS INDEX: 23.64 KG/M2 | DIASTOLIC BLOOD PRESSURE: 72 MMHG | RESPIRATION RATE: 21 BRPM

## 2022-08-31 PROCEDURE — 76060000031 HC ANESTHESIA FIRST 0.5 HR: Performed by: SPECIALIST

## 2022-08-31 PROCEDURE — 76040000019: Performed by: SPECIALIST

## 2022-08-31 PROCEDURE — 74011250636 HC RX REV CODE- 250/636: Performed by: SPECIALIST

## 2022-08-31 PROCEDURE — 74011250636 HC RX REV CODE- 250/636: Performed by: NURSE ANESTHETIST, CERTIFIED REGISTERED

## 2022-08-31 PROCEDURE — 2709999900 HC NON-CHARGEABLE SUPPLY: Performed by: SPECIALIST

## 2022-08-31 RX ORDER — DEXTROMETHORPHAN/PSEUDOEPHED 2.5-7.5/.8
1.2 DROPS ORAL
Status: DISCONTINUED | OUTPATIENT
Start: 2022-08-31 | End: 2022-08-31 | Stop reason: HOSPADM

## 2022-08-31 RX ORDER — SODIUM CHLORIDE 0.9 % (FLUSH) 0.9 %
5-40 SYRINGE (ML) INJECTION EVERY 8 HOURS
Status: DISCONTINUED | OUTPATIENT
Start: 2022-08-31 | End: 2022-08-31 | Stop reason: HOSPADM

## 2022-08-31 RX ORDER — SODIUM CHLORIDE 0.9 % (FLUSH) 0.9 %
5-40 SYRINGE (ML) INJECTION AS NEEDED
Status: DISCONTINUED | OUTPATIENT
Start: 2022-08-31 | End: 2022-08-31 | Stop reason: HOSPADM

## 2022-08-31 RX ORDER — PROPOFOL 10 MG/ML
INJECTION, EMULSION INTRAVENOUS AS NEEDED
Status: DISCONTINUED | OUTPATIENT
Start: 2022-08-31 | End: 2022-08-31 | Stop reason: HOSPADM

## 2022-08-31 RX ORDER — SODIUM CHLORIDE 9 MG/ML
50 INJECTION, SOLUTION INTRAVENOUS CONTINUOUS
Status: DISCONTINUED | OUTPATIENT
Start: 2022-08-31 | End: 2022-08-31 | Stop reason: HOSPADM

## 2022-08-31 RX ADMIN — PROPOFOL 25 MG: 10 INJECTION, EMULSION INTRAVENOUS at 11:35

## 2022-08-31 RX ADMIN — PROPOFOL 20 MG: 10 INJECTION, EMULSION INTRAVENOUS at 11:27

## 2022-08-31 RX ADMIN — PROPOFOL 25 MG: 10 INJECTION, EMULSION INTRAVENOUS at 11:38

## 2022-08-31 RX ADMIN — PROPOFOL 20 MG: 10 INJECTION, EMULSION INTRAVENOUS at 11:33

## 2022-08-31 RX ADMIN — SODIUM CHLORIDE 50 ML/HR: 9 INJECTION, SOLUTION INTRAVENOUS at 11:15

## 2022-08-31 RX ADMIN — PROPOFOL 50 MG: 10 INJECTION, EMULSION INTRAVENOUS at 11:26

## 2022-08-31 RX ADMIN — PROPOFOL 25 MG: 10 INJECTION, EMULSION INTRAVENOUS at 11:29

## 2022-08-31 RX ADMIN — PROPOFOL 25 MG: 10 INJECTION, EMULSION INTRAVENOUS at 11:41

## 2022-08-31 RX ADMIN — PROPOFOL 25 MG: 10 INJECTION, EMULSION INTRAVENOUS at 11:32

## 2022-08-31 RX ADMIN — PROPOFOL 25 MG: 10 INJECTION, EMULSION INTRAVENOUS at 11:44

## 2022-08-31 NOTE — PROGRESS NOTES
Endoscopy Case End Note:    1146:  Procedure scope was pre-cleaned, per protocol, at bedside by JASON Peck 1146:  Report received from anesthesia - [address] [place] [address] 7. See anesthesia flowsheet for intra-procedure vital signs and events.

## 2022-08-31 NOTE — ANESTHESIA POSTPROCEDURE EVALUATION
Procedure(s):  COLONOSCOPY.    total IV anesthesia, MAC    Anesthesia Post Evaluation        Patient location during evaluation: PACU  Note status: Adequate. Level of consciousness: responsive to verbal stimuli and sleepy but conscious  Pain management: satisfactory to patient  Airway patency: patent  Anesthetic complications: no  Cardiovascular status: acceptable  Respiratory status: acceptable  Hydration status: acceptable  Comments: +Post-Anesthesia Evaluation and Assessment    Patient: Tyler Vickers MRN: [id number]  SSN: [id number]   YOB: 1960  Age: 63 y.o. Sex: female      Cardiovascular Function/Vital Signs    /64   Pulse (!) 54   Temp 36.5 °C (97.7 °F)   Resp 22   Ht 5' 8\" (1.727 m)   Wt 70.8 kg (156 lb)   SpO2 99%   Breastfeeding No   BMI 23.72 kg/m²     Patient is status post Procedure(s):  COLONOSCOPY. Nausea/Vomiting: Controlled. Postoperative hydration reviewed and adequate. Pain:  Pain Scale 1: Numeric (0 - 10) (08/31/22 1157)  Pain Intensity 1: 0 (08/31/22 1157)   Managed. Neurological Status: At baseline. Mental Status and Level of Consciousness: Arousable. Pulmonary Status:   O2 Device: None (Room air) (08/31/22 1157)   Adequate oxygenation and airway patent. Complications related to anesthesia: None    Post-anesthesia assessment completed. No concerns. Signed By: Carley Cosme MD    8/31/2022  Post anesthesia nausea and vomiting:  controlled      INITIAL Post-op Vital signs:   Vitals Value Taken Time   BP 97/64 08/31/22 1200   Temp 36.5 °C (97.7 °F) 08/31/22 1157   Pulse 51 08/31/22 1201   Resp 17 08/31/22 1201   SpO2 97 % 08/31/22 1201   Vitals shown include unvalidated device data.

## 2022-08-31 NOTE — PROCEDURES
Colonoscopy Procedure Note    Indications:   Family history of coloretal cancer (screening only), Personal history of colon polyps (screening only)    Referring Physician: Wilian Clark MD  Anesthesia/Sedation: MAC anesthesia Propofol  Endoscopist:  Dr. Shelley Almanza    Procedure in Detail:  Informed consent was obtained for the procedure, including sedation. Risks of perforation, hemorrhage, adverse drug reaction, and aspiration were discussed. The patient was placed in the left lateral decubitus position. Based on the pre-procedure assessment, including review of the patient's medical history, medications, allergies, and review of systems, she had been deemed to be an appropriate candidate for moderate sedation; she was therefore sedated with the medications listed above. The patient was monitored continuously with ECG tracing, pulse oximetry, blood pressure monitoring, and direct observations. A rectal examination was performed. The [id number] was inserted into the rectum and advanced under direct vision to the cecum, which was identified by the ileocecal valve and appendiceal orifice. The quality of the colonic preparation was adequate. A careful inspection was made as the colonoscope was withdrawn, including a retroflexed view of the rectum; findings and interventions are described below. Appropriate photodocumentation was obtained. Findings:    Dilated colon with liquid residual prep suctioned revealing normal mucosa. No polyps seen. Small internal hemorrhoids. Scope was advanced to the cecum. Therapies:  none    Specimen:  none   Complications: None were encountered during the procedure. EBL: < 10 ml. Recommendations:     - Repeat colonoscopy in 5 years.     Signed By: Denise Acosta MD                        August 31, 2022

## 2022-08-31 NOTE — DISCHARGE INSTRUCTIONS
Venkatesh Scott  [id number]  1960    COLON DISCHARGE INSTRUCTIONS  Discomfort:  Redness at IV site- apply warm compress to area; if redness or soreness persist- contact your physician  There may be a slight amount of blood passed from the rectum  Gaseous discomfort- walking, belching will help relieve any discomfort  You may not operate a vehicle for 12 hours  You may not engage in an occupation involving machinery or appliances for rest of today  You may not drink alcoholic beverages for at least 12 hours  Avoid making any critical decisions for at least 24 hour  DIET:   Regular diet. - however -  remember your colon is empty and a heavy meal will produce gas. Avoid these foods:  vegetables, fried / greasy foods, carbonated drinks for today. MEDICATIONS:        Regarding Aspirin or Nonsteroidal medications, please see below. ACTIVITY:  You may resume your normal daily activities it is recommended that you spend the remainder of the day resting -  avoid any strenuous activity. CALL M.D. ANY SIGN OF:  Increasing pain, nausea, vomiting  Abdominal distension (swelling)  New increased bleeding (oral or rectal)  Fever (chills)  Pain in chest area  Bloody discharge from nose or mouth  Shortness of breath  Tylenol as needed for pain.       Follow-up Instructions:   Call Dr. Hayley Mares for questions about procedure at telephone #  [phone]              Repeat Colonoscopy in 5 years    Patient Education on Sedation / Analgesia Administered for Procedure      For 24 hours after general anesthesia or intravenous analgesia / sedation:  Have someone responsible help you with your care  Limit your activities  Do not drive and operate hazardous machinery  Do not make important personal, legal or business decisions  Do not drink alcoholic beverages  If you have not urinated within 8 hours after discharge, please contact your physician  Resume your medications unless otherwise instructed    For 24 hours after general anesthesia or intravenous analgesia / sedation  you may experience:  Drowsiness, dizziness, sleepiness, or confusion  Difficulty remembering or delayed reaction times  Difficulty with your balance, especially while walking, move slowly and carefully, do not make sudden position changes  Difficulty focusing or blurred vision    You may not be aware of slight changes in your behavior and/or your reaction time because of the medication used during and after your procedure.     Report the following to your physician:  Excessive pain, swelling, redness or odor of or around the surgical area  Temperature over 100.5  Nausea and vomiting lasting longer than 4 hours or if unable to take medications  Any signs of decreased circulation or nerve impairment to extremity: change in color, persistent numbness, tingling, coldness or increase pain  Any questions or concerns    IF YOU REPORT TO AN EMERGENCY ROOM, DOCTOR'S OFFICE OR HOSPITAL WITHIN 24 HOURS AFTER YOUR PROCEDURE, BRING THIS SHEET AND YOUR AFTER VISIT SUMMARY WITH YOU AND GIVE IT TO THE PHYSICIAN OR NURSE ATTENDING YOU.

## 2022-08-31 NOTE — H&P
Gastroenterology Outpatient History and Physical    Patient: Leo Harrison    Physician: Ana Cristina Bang MD    Vital Signs: Blood pressure 132/74, pulse 61, temperature 98 °F (36.7 °C), resp. rate 18, height 5' 8\" (1.727 m), weight 70.8 kg (156 lb), last menstrual period 04/01/2015, SpO2 100 %, not currently breastfeeding. 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    Cancer ([facility] 7/2015    triple (-) breast ca    Chest pain     Per Pt on Rt side and arm pit only, resolves with Tylenol and states she believes it is related to her tumor    Colon polyp     Dizziness     pt states it is vertigo    Echocardiogram normal 2/19/16    Family hx of colon cancer     History of chemotherapy     AUG 2015- JAN 2016    Ill-defined condition     HAD CHEMO LAST JAN 2016    Ill-defined condition     rhematoid arthritis,sjogren syndrome    Nephrotic syndrome 3/12/2011    biopsy inconclusive but steroid responsive    TMJ (temporomandibular joint disorder)     Toothache     being treated for tooth ache x 1 week.  pain better with treatment      Past Surgical History:   Procedure Laterality Date    ENDOSCOPY, COLON, DIAGNOSTIC  12/12/11    Dr. Siena Shelton Right 7/2015    HX BREAST RECONSTRUCTION Bilateral 2/23/2016    BREAST RECONSTRUCTION performed by Floyd Cardozo MD at [address] [place] [address] Bilateral 5/23/2016    REMOVAL TISSUE EXPANDERS BILATERAL BREAST / RECONSTRUCTION BILATERAL BREASTS / GEL IMPLANTS  performed by Angella Clay MD at [address] [place] [address] Bilateral 12/19/2016    REVISION BILATERAL BREAST RECONSTRUCTION WITH EXCISION OF EXCESS TISSUE / LATERAL BREAST BILATERAL NIPPLE RECONSTRUCTION WITH FTSG  performed by Angella Clay MD at [place] [address]    HX GI      COLONOSCOPY    HX GI      ENDOSCOPY    HX GYN      uterine ablation    HX MASTECTOMY Bilateral 2/23/2016    BILATERAL BREAST MASTECTOMY, BILATERAL BREAST RECONSTRUCTION WITH TISSUE EXPANDERS AFTER MASTECTOMIES REMOVE PORT LEFT CHEST performed by Kandi Tang MD at [place] [address]    HX TONSILLECTOMY      HX TUBAL LIGATION      HX VASCULAR ACCESS      PORT-A-CATH    HX VASCULAR ACCESS      PORT-A-CATH REMOVAL    KS BREAST SURGERY PROCEDURE UNLISTED Right 8/13/15    RIGHT axillary SLNB and port a cath insertion      Social History     Socioeconomic History    Marital status:    Tobacco Use    Smoking status: Never    Smokeless tobacco: Never   Vaping Use    Vaping Use: Never used   Substance and Sexual Activity    Alcohol use: No    Drug use: No    Sexual activity: Yes     Partners: Male     Birth control/protection: None      Family History   Problem Relation Age of Onset    Cancer Mother         colon cancer    Hypertension Mother     Kidney Disease Mother         dialysis    Alcohol abuse Father     Stroke Sister     Cancer Brother         bone cancer    Anemia Daughter     Diabetes Brother     Heart Surgery Sister         CABG 3 WAY    Heart Attack Sister     No Known Problems Brother     Anesth Problems Son     Delayed Awakening Son        Medications:   Prior to Admission medications    Medication Sig Start Date End Date Taking? Authorizing Provider   fexofenadine (ALLEGRA) 180 mg tablet Take  by mouth. Yes Provider, Historical   cholecalciferol (VITAMIN D3) (1000 Units /25 mcg) tablet Take  by mouth daily.    Yes Provider, Historical   valsartan (DIOVAN) 160 mg tablet two (2) times a day. Yes Provider, Historical   denosumab (Prolia) 60 mg/mL injection 1 mL. Yes Provider, Historical   aspirin 81 mg chewable tablet Take 81 mg by mouth daily. Yes Provider, Historical   predniSONE (STERAPRED DS) 10 mg dose pack Per Dose Pack instructions  Patient not taking: Reported on 8/31/2022 7/2/22   BENSON Garcia   cyclobenzaprine (FLEXERIL) 5 mg tablet Take 1 Tablet by mouth three (3) times daily as needed for Muscle Spasm(s) (low back pain). Patient not taking: Reported on 8/31/2022 7/2/22   BENSON Garcia   meclizine (ANTIVERT) 25 mg tablet Take 0.5-1 Tablets by mouth three (3) times daily as needed for Dizziness. Patient not taking: Reported on 8/31/2022 5/25/22   Tory Anders MD       Physical Exam:   General: alert, no distress   HEENT: Head: Normocephalic, no lesions, without obvious abnormality.    Heart: regular rate and rhythm, S1, S2 normal, no murmur, click, rub or gallop   Lungs: chest clear, no wheezing, rales, normal symmetric air entry   Abdominal: soft, NT/ND + BS   Neurological: Grossly normal   Extremities: extremities normal, atraumatic, no cyanosis or edema     Findings/Diagnosis: FH colon ca, H/O Colon Polyps    Plan of Care/Planned Procedure: Colonoscopy

## 2022-08-31 NOTE — ROUTINE PROCESS
Zamzam Bueno  1960  [id number]    Situation:  Verbal report received from: [employer]  Procedure: Procedure(s):  COLONOSCOPY    Background:    Preoperative diagnosis: SCREENING COLONSCOPY  Postoperative diagnosis: normal colonoscopy    :  Dr. Dinorah Chapman  Assistant(s): Endoscopy Technician-1: Sal Couch  Endoscopy RN-1: Greg Arriaza RN    Specimens: * No specimens in log *  H. Pylori  no    Assessment:  Intra-procedure medications     Anesthesia gave intra-procedure sedation and medications, see anesthesia flow sheet yes    Intravenous fluids: NS@ KVO     Vital signs stable     Abdominal assessment: round and soft     Recommendation:  Discharge patient per MD order.   Family   Permission to share finding with family or friend yes
223.1

## 2022-09-21 ENCOUNTER — TELEPHONE (OUTPATIENT)
Dept: INTERNAL MEDICINE CLINIC | Age: 62
End: 2022-09-21

## 2022-09-21 NOTE — TELEPHONE ENCOUNTER
----- Message from [address] [place] [address] sent at 9/21/2022  3:30 PM EDT -----  Subject: Appointment Request    Reason for Call: Established Patient Appointment needed: Semi-Routine Skin   Problem    QUESTIONS    Reason for appointment request? No appointments available during search     Additional Information for Provider? patient c/o growth (mole?) on chest   [facility] with [facility] to [facility]   [facility] not virtual  ---------------------------------------------------------------------------  --------------  [id number]  [phone]; OK to leave message on voicemail,Do not leave any message,   patient will call back for answer  ---------------------------------------------------------------------------  --------------  SCRIPT ANSWERS  SEAN Screen: Supriya Calixto

## 2022-09-24 NOTE — PROGRESS NOTES
Jasiel Tsai is a 63 y.o. female who presents with concern of mole on chest.  Patient states it is not painful. It is in an area of other skin lesions. Reports pain in both feet, near heel. Around house barefoot. Past Medical History:   Diagnosis Date    ARF (acute renal failure) ([facility] 3/12/11    as of 8/11/15 pt states has resolved and is not currently being folllowed by specialist    Autoimmune disease ([facility] 2016    SJOURN SYNDROM NEW DX & RA    Cancer ([facility] 7/2015    triple (-) breast ca    Chest pain     Per Pt on Rt side and arm pit only, resolves with Tylenol and states she believes it is related to her tumor    Colon polyp     Dizziness     pt states it is vertigo    Echocardiogram normal 2/19/16    Family hx of colon cancer     History of chemotherapy     AUG 2015- JAN 2016    Ill-defined condition     HAD CHEMO LAST JAN 2016    Ill-defined condition     rhematoid arthritis,sjogren syndrome    Nephrotic syndrome 3/12/2011    biopsy inconclusive but steroid responsive    TMJ (temporomandibular joint disorder)     Toothache     being treated for tooth ache x 1 week.  pain better with treatment       Family History   Problem Relation Age of Onset    Cancer Mother         colon cancer    Hypertension Mother     Kidney Disease Mother         dialysis    Alcohol abuse Father     Stroke Sister     Cancer Brother         bone cancer    Anemia Daughter     Diabetes Brother     Heart Surgery Sister         CABG 3 WAY    Heart Attack Sister     No Known Problems Brother     Anesth Problems Son     Delayed Awakening Son        Social History     Socioeconomic History    Marital status:      Spouse name: Not on file    Number of children: Not on file    Years of education: Not on file    Highest education level: Not on file   Occupational History    Not on file   Tobacco Use    Smoking status: Never    Smokeless tobacco: Never   Vaping Use    Vaping Use: Never used   Substance and Sexual Activity Alcohol use: No    Drug use: No    Sexual activity: Yes     Partners: Male     Birth control/protection: None   Other Topics Concern    Not on file   Social History Narrative    Not on file     Social Determinants of Health     Financial Resource Strain: Not on file   Food Insecurity: Not on file   Transportation Needs: Not on file   Physical Activity: Not on file   Stress: Not on file   Social Connections: Not on file   Intimate Partner Violence: Not on file   Housing Stability: Not on file       Current Outpatient Medications on File Prior to Visit   Medication Sig Dispense Refill    fexofenadine (ALLEGRA) 180 mg tablet Take  by mouth. cholecalciferol (VITAMIN D3) (1000 Units /25 mcg) tablet Take  by mouth daily. valsartan (DIOVAN) 160 mg tablet two (2) times a day. denosumab (Prolia) 60 mg/mL injection 1 mL. aspirin 81 mg chewable tablet Take 81 mg by mouth daily. predniSONE (STERAPRED DS) 10 mg dose pack Per Dose Pack instructions (Patient not taking: No sig reported) 21 Tablet 0    cyclobenzaprine (FLEXERIL) 5 mg tablet Take 1 Tablet by mouth three (3) times daily as needed for Muscle Spasm(s) (low back pain). (Patient not taking: No sig reported) 20 Tablet 0    meclizine (ANTIVERT) 25 mg tablet Take 0.5-1 Tablets by mouth three (3) times daily as needed for Dizziness. (Patient not taking: No sig reported) 30 Tablet 1     No current facility-administered medications on file prior to visit. Review of Systems  Pertinent items are noted in HPI. Objective:     Visit Vitals  /63   Pulse 68   Temp (!) 96.7 °F (35.9 °C) (Temporal)   Resp 16   Ht 5' 8\" (1.727 m)   Wt 160 lb (72.6 kg)   LMP 04/01/2015   SpO2 97%   BMI 24.33 kg/m²     Gen: well appearing female  Skin: sebaceous cyst on chest wall. No superinfection.       Assessment/Plan:       ICD-10-CM ICD-9-CM    1. Sebaceous cyst  L72.3 706.2 REFERRAL TO PLASTIC SURGERY          Follow-up and Dispositions    Return if symptoms worsen or fail to improve.          Iva Lopez MD

## 2022-09-26 ENCOUNTER — OFFICE VISIT (OUTPATIENT)
Dept: INTERNAL MEDICINE CLINIC | Age: 62
End: 2022-09-26
Payer: COMMERCIAL

## 2022-09-26 VITALS
SYSTOLIC BLOOD PRESSURE: 100 MMHG | HEART RATE: 68 BPM | WEIGHT: 160 LBS | RESPIRATION RATE: 16 BRPM | HEIGHT: 68 IN | TEMPERATURE: 96.7 F | DIASTOLIC BLOOD PRESSURE: 63 MMHG | BODY MASS INDEX: 24.25 KG/M2 | OXYGEN SATURATION: 97 %

## 2022-09-26 DIAGNOSIS — L72.3 SEBACEOUS CYST: Primary | ICD-10-CM

## 2022-09-26 PROCEDURE — 99212 OFFICE O/P EST SF 10 MIN: CPT | Performed by: FAMILY MEDICINE

## 2022-09-26 NOTE — PROGRESS NOTES
1. \"Have you been to the ER, urgent care clinic since your last visit? Hospitalized since your last visit? \" Yes ER, for back pain    2. \"Have you seen or consulted any other health care providers outside of the [address] [place] [address] since your last visit? \" No     3. For patients aged 39-70: Has the patient had a colonoscopy / FIT/ Cologuard? Yes - no Care Gap present      If the patient is female:    4. For patients aged 41-77: Has the patient had a mammogram within the past 2 years? NA - based on age or sex      11. For patients aged 21-65: Has the patient had a pap smear?  Yes - no Care Gap present

## 2022-12-05 NOTE — PROGRESS NOTES
Jessica Pineda is a 64 y.o. female who presents with concern of rash on left wrist and back of right hand for one week. Using hydrocortisone cream, rash better today. Reports neck pain, posterior. Had left gland swelling on and off. It is not swollen right now. Reports popping in neck with movement. Will get hand numbness on and off. Not painful. Taking advil 200mg with some relief xray c-spine spondylosis. On prolia every 6 months, had some back pain after shot. Some right lateral hip pain. Worse with sitting. Active regularly. On elliptical.       History of breast cancer. Bilateral mastectomy and chemo. Followed by Dr. Tarun Fay for sjogrens. Every 6 months. Past Medical History:   Diagnosis Date    ARF (acute renal failure) ([facility] 3/12/11    as of 8/11/15 pt states has resolved and is not currently being folllowed by specialist    Autoimmune disease ([facility] 2016    SJOURN SYNDROM NEW DX & RA    Cancer ([facility] 7/2015    triple (-) breast ca    Chest pain     Per Pt on Rt side and arm pit only, resolves with Tylenol and states she believes it is related to her tumor    Colon polyp     Dizziness     pt states it is vertigo    Echocardiogram normal 2/19/16    Family hx of colon cancer     History of chemotherapy     AUG 2015- JAN 2016    Ill-defined condition     HAD CHEMO LAST JAN 2016    Ill-defined condition     rhematoid arthritis,sjogren syndrome    Nephrotic syndrome 3/12/2011    biopsy inconclusive but steroid responsive    TMJ (temporomandibular joint disorder)     Toothache     being treated for tooth ache x 1 week.  pain better with treatment       Family History   Problem Relation Age of Onset    Cancer Mother         colon cancer    Hypertension Mother     Kidney Disease Mother         dialysis    Alcohol abuse Father     Stroke Sister     Cancer Brother         bone cancer    Anemia Daughter     Diabetes Brother     Heart Surgery Sister         CABG 3 WAY    Heart Attack Sister     No Known Problems Brother     Anesth Problems Son     Delayed Awakening Son        Social History     Socioeconomic History    Marital status:      Spouse name: Not on file    Number of children: Not on file    Years of education: Not on file    Highest education level: Not on file   Occupational History    Not on file   Social Needs    Financial resource strain: Not on file    Food insecurity:     Worry: Not on file     Inability: Not on file    Transportation needs:     Medical: Not on file     Non-medical: Not on file   Tobacco Use    Smoking status: Never Smoker    Smokeless tobacco: Never Used   Substance and Sexual Activity    Alcohol use: No    Drug use: No    Sexual activity: Yes     Partners: Male     Birth control/protection: None   Lifestyle    Physical activity:     Days per week: Not on file     Minutes per session: Not on file    Stress: Not on file   Relationships    Social connections:     Talks on phone: Not on file     Gets together: Not on file     Attends Church service: Not on file     Active member of club or organization: Not on file     Attends meetings of clubs or organizations: Not on file     Relationship status: Not on file    Intimate partner violence:     Fear of current or ex partner: Not on file     Emotionally abused: Not on file     Physically abused: Not on file     Forced sexual activity: Not on file   Other Topics Concern    Not on file   Social History Narrative    Not on file       Current Outpatient Medications on File Prior to Visit   Medication Sig Dispense Refill    denosumab (PROLIA) 60 mg/mL injection 60 mg by SubCUTAneous route.  loratadine (CLARITIN) 10 mg tablet Take 10 mg by mouth.       esomeprazole (NEXIUM) 40 mg capsule take 1 capsule by mouth once daily  1    PREVIDENT 5000 SENSITIVE 1.1-5 % pste   0    DENAVIR 1 % topical cream        No current facility-administered medications on file prior to visit. Review of Systems  Pertinent items are noted in HPI. Objective:     Visit Vitals  /73 (BP 1 Location: Left arm, BP Patient Position: Sitting)   Pulse (!) 53   Temp 98.1 °F (36.7 °C) (Oral)   Resp 16   Ht 5' 8\" (1.727 m)   Wt 162 lb 3.2 oz (73.6 kg)   LMP 04/01/2015   SpO2 97%   BMI 24.66 kg/m²     Gen: well appearing female  HEENT:   PERRL,normal conjunctiva. External ear and canals normal, TMs no opacification or erythema,  OP no erythema, no exudates, MMM  Neck:  Decreased lateral ROM, No masses or LAD  Resp:  No wheezing, no rhonchi, no rales. CV:  RRR, normal S1S2, no murmur. Extrem:   no edema, warm distally  Skin: no rash      Assessment/Plan:       ICD-10-CM ICD-9-CM    1. Neck pain M54.2 723.1    2. Screening for hyperlipidemia Z13.220 V77.91 LIPID PANEL   3. Sjogren's syndrome, with unspecified organ involvement ([facility] M35.00 710.2    use heat, advil 400mg, given stretches. Follow-up and Dispositions    · Return if symptoms worsen or fail to improve.          Mackenzie Rao MD 05-Dec-2022 23:42

## 2023-01-16 ENCOUNTER — OFFICE VISIT (OUTPATIENT)
Dept: SURGERY | Age: 63
End: 2023-01-16
Payer: COMMERCIAL

## 2023-01-16 VITALS
SYSTOLIC BLOOD PRESSURE: 120 MMHG | DIASTOLIC BLOOD PRESSURE: 71 MMHG | WEIGHT: 155 LBS | HEART RATE: 65 BPM | HEIGHT: 68 IN | BODY MASS INDEX: 23.49 KG/M2

## 2023-01-16 DIAGNOSIS — Z85.3 HISTORY OF BREAST CANCER IN FEMALE: ICD-10-CM

## 2023-01-16 DIAGNOSIS — Z90.13 HISTORY OF BILATERAL MASTECTOMY: Primary | ICD-10-CM

## 2023-01-16 PROCEDURE — 99213 OFFICE O/P EST LOW 20 MIN: CPT | Performed by: NURSE PRACTITIONER

## 2023-01-16 NOTE — PROGRESS NOTES
HISTORY OF PRESENT ILLNESS  Charleston Hammans is a 63 y.o. female. HPI  ESTABLISHED patient here for follow up RIGHT breast cancer. Denies breast mass, skin changes and pain. History of breast cancer-  Referring - Dr. Dominick Lind - [address] breast IDC T2N0 triple negative  Completed neoadjuvant chemotherapy. Followed by Dr. Toby Banegas (originally 3.5 cm, 1.8 cm post neoadjuvant chemo)  2/23/16- BILATERAL mastectomy with reconstruction, RIGHT SLNB and port removal - Dr. Cyndi Meléndez  No radiation. 5/2016- BILATERAL implants placed by Dr. Larry Reardon. 12/2016- BILATERAL revision breast reconstruction. No family history of breast or ovarian cancer. Patient has VUS BRCA 1 ([place]). OB History    No obstetric history on file. Obstetric Comments   Menarche:  8. LMP: 4/8/15. # of Children:  3. Age at Delivery of First Child:  14.   Hysterectomy/oophorectomy:  NO/NO. Breast Bx:  yes. Hx of Breast Feeding:  no. BCP:  yes.  Hormone therapy:  no.                      Past Surgical History:   Procedure Laterality Date    COLONOSCOPY N/A 8/31/2022    COLONOSCOPY performed by Alex Quan MD at [facility] ENDOSCOPY    ENDOSCOPY, COLON, DIAGNOSTIC  12/12/11    Dr. Quoc Stephens Right 7/2015    HX BREAST RECONSTRUCTION Bilateral 2/23/2016    BREAST RECONSTRUCTION performed by Otto Hernández MD at [address] [place] [address] Bilateral 5/23/2016    REMOVAL TISSUE EXPANDERS BILATERAL BREAST / RECONSTRUCTION BILATERAL BREASTS / GEL IMPLANTS  performed by Otto Hernández MD at [place] [address]    HX BREAST RECONSTRUCTION Bilateral 12/19/2016    REVISION BILATERAL BREAST RECONSTRUCTION WITH EXCISION OF EXCESS TISSUE / LATERAL BREAST BILATERAL NIPPLE RECONSTRUCTION WITH FTSG  performed by Otto Hernándze MD at [place] [address]    HX GI      COLONOSCOPY    HX GI      ENDOSCOPY    HX GYN      uterine ablation    HX MASTECTOMY Bilateral 2/23/2016    BILATERAL BREAST MASTECTOMY, BILATERAL BREAST RECONSTRUCTION WITH TISSUE EXPANDERS AFTER MASTECTOMIES REMOVE PORT LEFT CHEST performed by Chano Cameron MD at [place] [address]    HX TONSILLECTOMY      HX TUBAL LIGATION      HX VASCULAR ACCESS      PORT-A-CATH    HX VASCULAR ACCESS      PORT-A-CATH REMOVAL    SC BREAST SURGERY PROCEDURE UNLISTED Right 8/13/15    RIGHT axillary SLNB and port a cath insertion         ROS    Physical Exam  Constitutional:       Appearance: Normal appearance. Chest:   Breasts:     Right: Absent. Left: Absent. Comments: Chest wall s/p BILATERAL mastectomy with reconstruction  Musculoskeletal:      Comments: FROM - UE x 2   Lymphadenopathy:      Upper Body:      Right upper body: No supraclavicular or axillary adenopathy. Left upper body: No supraclavicular or axillary adenopathy. Neurological:      Mental Status: She is alert. Psychiatric:         Attention and Perception: Attention normal.         Mood and Affect: Mood normal.         Speech: Speech normal.         Behavior: Behavior normal.       Visit Vitals  /71 (BP 1 Location: Left upper arm, BP Patient Position: Sitting, BP Cuff Size: Small adult)   Pulse 65   Ht 5' 8\" (1.727 m)   Wt 155 lb (70.3 kg)   LMP 04/01/2015   BMI 23.57 kg/m²       ASSESSMENT and PLAN    ICD-10-CM ICD-9-CM    1. History of bilateral mastectomy  Z90.13 V45.71       2. History of breast cancer in female  Z85.3 V10.3           Normal exam with no evidence of local recurrence. Reviewed s/sx of local recurrence. Follow-up with plastic surgery PRN. Would like to continue annual CBE here. RTC in 1 year or sooner PRN. She is comfortable with this plan. All questions answered and she stated understanding. Total time spent for this patient - 20 minutes.

## 2023-06-23 ENCOUNTER — TELEPHONE (OUTPATIENT)
Age: 63
End: 2023-06-23

## 2023-06-23 NOTE — TELEPHONE ENCOUNTER
----- Message from Joni Fabio sent at 6/23/2023  1:47 PM EDT -----  Subject: Appointment Request    Reason for Call: Established Patient Appointment needed: Routine Physical   Exam    QUESTIONS    Reason for appointment request? Available appointments did not meet   patient need     Additional Information for Provider?  Patient would like to be seen prior   to October for her yearly exam. Patient would like to be seen in the   morning if possible please call to advise.   ---------------------------------------------------------------------------  --------------  [address] [facility]  [phone]; OK to leave message on voicemail  ---------------------------------------------------------------------------  --------------  SCRIPT ANSWERS

## 2023-07-12 ENCOUNTER — OFFICE VISIT (OUTPATIENT)
Age: 63
End: 2023-07-12

## 2023-07-12 VITALS — BODY MASS INDEX: 23.49 KG/M2 | HEIGHT: 68 IN | WEIGHT: 155 LBS

## 2023-07-12 DIAGNOSIS — Z85.3 HISTORY OF RIGHT BREAST CANCER: Primary | ICD-10-CM

## 2023-07-12 PROCEDURE — 99024 POSTOP FOLLOW-UP VISIT: CPT | Performed by: SURGERY

## 2023-10-11 ENCOUNTER — OFFICE VISIT (OUTPATIENT)
Age: 63
End: 2023-10-11
Payer: COMMERCIAL

## 2023-10-11 VITALS
OXYGEN SATURATION: 97 % | SYSTOLIC BLOOD PRESSURE: 105 MMHG | WEIGHT: 159 LBS | HEIGHT: 68 IN | HEART RATE: 63 BPM | TEMPERATURE: 98.1 F | BODY MASS INDEX: 24.1 KG/M2 | RESPIRATION RATE: 20 BRPM | DIASTOLIC BLOOD PRESSURE: 64 MMHG

## 2023-10-11 DIAGNOSIS — Z23 NEEDS FLU SHOT: ICD-10-CM

## 2023-10-11 DIAGNOSIS — Z00.00 ROUTINE MEDICAL EXAM: Primary | ICD-10-CM

## 2023-10-11 DIAGNOSIS — Z85.3 PERSONAL HISTORY OF MALIGNANT NEOPLASM OF BREAST: ICD-10-CM

## 2023-10-11 DIAGNOSIS — E78.00 PURE HYPERCHOLESTEROLEMIA, UNSPECIFIED: ICD-10-CM

## 2023-10-11 DIAGNOSIS — M35.00 SJOGREN'S SYNDROME, WITH UNSPECIFIED ORGAN INVOLVEMENT (HCC): ICD-10-CM

## 2023-10-11 DIAGNOSIS — M06.9 RHEUMATOID ARTHRITIS, INVOLVING UNSPECIFIED SITE, UNSPECIFIED WHETHER RHEUMATOID FACTOR PRESENT (HCC): ICD-10-CM

## 2023-10-11 DIAGNOSIS — Z90.13 S/P BILATERAL MASTECTOMY: ICD-10-CM

## 2023-10-11 PROCEDURE — 99396 PREV VISIT EST AGE 40-64: CPT | Performed by: FAMILY MEDICINE

## 2023-10-11 PROCEDURE — 90471 IMMUNIZATION ADMIN: CPT | Performed by: FAMILY MEDICINE

## 2023-10-11 PROCEDURE — 90674 CCIIV4 VAC NO PRSV 0.5 ML IM: CPT | Performed by: FAMILY MEDICINE

## 2023-10-11 ASSESSMENT — PATIENT HEALTH QUESTIONNAIRE - PHQ9
1. LITTLE INTEREST OR PLEASURE IN DOING THINGS: 0
SUM OF ALL RESPONSES TO PHQ9 QUESTIONS 1 & 2: 0
SUM OF ALL RESPONSES TO PHQ QUESTIONS 1-9: 0
2. FEELING DOWN, DEPRESSED OR HOPELESS: 0
SUM OF ALL RESPONSES TO PHQ QUESTIONS 1-9: 0

## 2023-10-11 NOTE — PROGRESS NOTES
1. \"Have you been to the ER, urgent care clinic since your last visit? Hospitalized since your last visit? \" No    2. \"Have you seen or consulted any other health care providers outside of the [address] [place] [address] since your last visit? \" No     3. For patients aged 43-73: Has the patient had a colonoscopy / FIT/ Cologuard? Yes      If the patient is female:    4. For patients aged 43-66: Has the patient had a mammogram within the past 2 years, Yes      5. For patients aged 21-65: Has the patient had a pap smear?  Yes - no Care Gap present
After obtaining consent, and per verbal order from Dr. Uzair Kaye, patient received influenza vaccine given by Javy Zhu in left Deltoid. Influenza Vaccine 0.5 mL IM now. Patient was observed for 10 minutes post injection. Patient tolerated injection well.        Javy Zhu LPN
No wheezing, no rhonchi, no rales. CV:  RRR, normal S1S2, no murmur. GI: soft, nontender, without masses. No hepatosplenomegaly. Extrem:  +2 pulses, no edema, warm distally  Skin: left axilla, small seb cyst.        Assessment/Plan:      Diagnosis Orders   1. Routine medical exam        2. Pure hypercholesterolemia, unspecified  Lipid Panel      3. Rheumatoid arthritis, involving unspecified site, unspecified whether rheumatoid factor present ([address])        4. S/P bilateral mastectomy        5. Personal history of malignant neoplasm of breast        6. Sjogren's syndrome, with unspecified organ involvement ([address])        7. Needs flu shot  Influenza, FLUCELVAX, (age 10 mo+), IM, Preservative Free, 0.5 mL        Recommend heart healthy diet and regular cardiovascular exercise.       Yen Landrum MD

## 2023-10-12 DIAGNOSIS — E78.00 PURE HYPERCHOLESTEROLEMIA, UNSPECIFIED: ICD-10-CM

## 2023-11-06 ENCOUNTER — NURSE TRIAGE (OUTPATIENT)
Dept: OTHER | Facility: CLINIC | Age: 63
End: 2023-11-06

## 2023-11-06 NOTE — TELEPHONE ENCOUNTER
[facility]    Received call from Shravan at [employer] with [employer]. Subjective: Caller states \"R knee pain and swelling, for the past few days pain is getting worse. \"     Current Symptoms: R knee pain and swelling    Onset: 3 days ago; gradual, worsening    Associated Symptoms: reduced activity    Pain Severity: 8/10; throbbing, numbness; intermittent    Temperature: Denies     What has been tried: Tylenol    Recommended disposition: See PCP within 3 Days    Care advice provided, patient verbalizes understanding; denies any other questions or concerns; instructed to call back for any new or worsening symptoms. Patient/Caller agrees with recommended disposition; writer provided warm transfer to [facility] at [employer] for appointment scheduling    Attention Provider: Thank you for allowing me to participate in the care of your patient. The patient was connected to triage in response to information provided to the ECC/PSC. Please do not respond through this encounter as the response is not directed to a shared pool.         Reason for Disposition   MILD or MODERATE swelling (e.g., can't move joint normally, can't do usual activities) (Exceptions: Itchy, localized swelling; swelling is chronic.)    Protocols used: Knee Swelling-ADULT-[place]

## 2023-11-07 ENCOUNTER — OFFICE VISIT (OUTPATIENT)
Age: 63
End: 2023-11-07
Payer: COMMERCIAL

## 2023-11-07 ENCOUNTER — HOSPITAL ENCOUNTER (OUTPATIENT)
Facility: HOSPITAL | Age: 63
Discharge: HOME | End: 2023-11-10
Payer: COMMERCIAL

## 2023-11-07 VITALS
HEIGHT: 68 IN | BODY MASS INDEX: 24.16 KG/M2 | OXYGEN SATURATION: 98 % | HEART RATE: 63 BPM | DIASTOLIC BLOOD PRESSURE: 63 MMHG | SYSTOLIC BLOOD PRESSURE: 100 MMHG | RESPIRATION RATE: 18 BRPM | WEIGHT: 159.4 LBS | TEMPERATURE: 97.9 F

## 2023-11-07 DIAGNOSIS — M25.561 RIGHT KNEE PAIN, UNSPECIFIED CHRONICITY: Primary | ICD-10-CM

## 2023-11-07 DIAGNOSIS — M25.561 RIGHT KNEE PAIN, UNSPECIFIED CHRONICITY: ICD-10-CM

## 2023-11-07 PROCEDURE — 99213 OFFICE O/P EST LOW 20 MIN: CPT | Performed by: INTERNAL MEDICINE

## 2023-11-07 PROCEDURE — 73560 X-RAY EXAM OF KNEE 1 OR 2: CPT

## 2023-11-07 RX ORDER — ASPIRIN 81 MG/1
TABLET, CHEWABLE ORAL
COMMUNITY

## 2023-11-07 RX ORDER — VALSARTAN 80 MG/1
80 TABLET ORAL DAILY
COMMUNITY
Start: 2023-10-17

## 2023-12-13 ENCOUNTER — HOSPITAL ENCOUNTER (OUTPATIENT)
Age: 63
Discharge: HOME | End: 2023-12-16
Payer: COMMERCIAL

## 2023-12-13 ENCOUNTER — OFFICE VISIT (OUTPATIENT)
Age: 63
End: 2023-12-13

## 2023-12-13 VITALS
DIASTOLIC BLOOD PRESSURE: 78 MMHG | SYSTOLIC BLOOD PRESSURE: 119 MMHG | HEART RATE: 67 BPM | TEMPERATURE: 98.7 F | BODY MASS INDEX: 25.39 KG/M2 | OXYGEN SATURATION: 98 % | WEIGHT: 167 LBS

## 2023-12-13 DIAGNOSIS — M25.461 KNEE EFFUSION, RIGHT: ICD-10-CM

## 2023-12-13 DIAGNOSIS — J02.9 SORE THROAT: Primary | ICD-10-CM

## 2023-12-13 DIAGNOSIS — R05.1 ACUTE COUGH: ICD-10-CM

## 2023-12-13 LAB
INFLUENZA A ANTIGEN, POC: NEGATIVE
INFLUENZA B ANTIGEN, POC: NEGATIVE
Lab: NORMAL
QC PASS/FAIL: NORMAL
SARS-COV-2 RDRP RESP QL NAA+PROBE: NEGATIVE
STREP PYOGENES DNA, POC: NEGATIVE
VALID INTERNAL CONTROL, POC: NORMAL
VALID INTERNAL CONTROL, POC: NORMAL

## 2023-12-13 PROCEDURE — 73721 MRI JNT OF LWR EXTRE W/O DYE: CPT

## 2023-12-13 ASSESSMENT — ENCOUNTER SYMPTOMS
SORE THROAT: 1
NAUSEA: 0
SINUS PAIN: 1
RHINORRHEA: 1
COUGH: 1
SWOLLEN GLANDS: 0

## 2023-12-13 NOTE — PATIENT INSTRUCTIONS
Results for orders placed or performed in visit on 12/13/23   AMB POC STREP GO A DIRECT, DNA PROBE   Result Value Ref Range    Valid Internal Control, POC Pass     Strep pyogenes DNA, POC Negative       Covid and Flu- negative   Mucinex Fast max 2 tab 4 times/ day - day/ night pack   Or Dayquil/Nyquil    Motrin as needed  Zyrtec/ Allegra daily   Saline sinus rinse

## 2023-12-13 NOTE — PROGRESS NOTES
congestion or rhinorrhea. Mouth/Throat:      Pharynx: No oropharyngeal exudate or posterior oropharyngeal erythema. Eyes:      Conjunctiva/sclera: Conjunctivae normal.   Cardiovascular:      Rate and Rhythm: Normal rate. Pulmonary:      Effort: Pulmonary effort is normal. No respiratory distress. Breath sounds: Normal breath sounds. No wheezing, rhonchi or rales. Lymphadenopathy:      Cervical: No cervical adenopathy. Skin:     Findings: No rash. Neurological:      Mental Status: She is alert. 1. Sore throat  -     AMB POC STREP GO A DIRECT, DNA PROBE  -     AMB POC INFLUENZA A  AND B REAL-TIME RT-PCR  2. Acute cough  -     POCT COVID-19 Rapid, NAAT       Results for orders placed or performed in visit on 12/13/23   AMB POC STREP GO A DIRECT, DNA PROBE   Result Value Ref Range    Valid Internal Control, POC Pass     Strep pyogenes DNA, POC Negative    AMB POC INFLUENZA A  AND B REAL-TIME RT-PCR   Result Value Ref Range    Valid Internal Control, POC Pass     Influenza A Antigen, POC Negative     Influenza B Antigen, POC Negative    POCT COVID-19 Rapid, NAAT   Result Value Ref Range    SARS-COV-2, RdRp gene Negative Negative    Lot Number [id number]     QC Pass/Fail Pass      The patients condition was discussed with the patient and they understand. The patient is to follow up with primary care doctor. If signs and symptoms become worse the pt is to go to the ER. The patient is to take medications as prescribed.

## 2024-01-22 ENCOUNTER — TELEPHONE (OUTPATIENT)
Age: 64
End: 2024-01-22

## 2024-01-22 ENCOUNTER — OFFICE VISIT (OUTPATIENT)
Age: 64
End: 2024-01-22
Payer: COMMERCIAL

## 2024-01-22 ENCOUNTER — CLINICAL DOCUMENTATION (OUTPATIENT)
Age: 64
End: 2024-01-22

## 2024-01-22 VITALS — WEIGHT: 167 LBS | BODY MASS INDEX: 25.31 KG/M2 | HEIGHT: 68 IN

## 2024-01-22 DIAGNOSIS — Z85.3 HISTORY OF RIGHT BREAST CANCER: Primary | ICD-10-CM

## 2024-01-22 DIAGNOSIS — Z90.13 STATUS POST BILATERAL MASTECTOMY: ICD-10-CM

## 2024-01-22 PROCEDURE — 99213 OFFICE O/P EST LOW 20 MIN: CPT | Performed by: NURSE PRACTITIONER

## 2024-01-22 NOTE — PROGRESS NOTES
HISTORY OF PRESENT ILLNESS  Laura Dennis is a 63 y.o. female     HPI Established patient presents for follow-up for RIGHT breast cancer.  S/P BILATERAL mastectomy.  No breast/chest concerns at this time.      Breast history -  Referring - Dr. Carrie Monzon - [facility]  RIGHT breast IDC T2N0 triple negative  Completed neoadjuvant chemotherapy. Followed by Dr. Sebastian (originally 3.5 cm, 1.8 cm post neoadjuvant chemo)  2/23/16- BILATERAL mastectomy with reconstruction, RIGHT SLNB and port removal - Dr. Briceño  No radiation.   5/2016- BILATERAL implants placed by Dr. Berumen.  12/2016- BILATERAL revision breast reconstruction.   7/2023 - LEFT axillary sebaceous cyst       Family history -   No family history of breast or ovarian cancer.   Patient has VUS BRCA 1 ([place]).      OB History    No obstetric history on file.      Obstetric Comments   Menarche:  13.   LMP: 4/8/15.  # of Children:  3.  Age at Delivery of First Child:  18.   Hysterectomy/oophorectomy:  NO/NO.  Breast Bx:  yes.  Hx of Breast Feeding:  no.  BCP:  yes. Hormone therapy:  no.                  Past Surgical History:   Procedure Laterality Date    BREAST BIOPSY Right 7/2015    BREAST RECONSTRUCTION Bilateral 5/23/2016    REMOVAL TISSUE EXPANDERS BILATERAL BREAST / RECONSTRUCTION BILATERAL BREASTS / GEL IMPLANTS  performed by Porter Berumen III, MD at [facility] AMBULATORY OR    BREAST RECONSTRUCTION Bilateral 2/23/2016    BREAST RECONSTRUCTION performed by Porter Berumen III, MD at [facility] AMBULATORY OR    BREAST RECONSTRUCTION Bilateral 12/19/2016    REVISION BILATERAL BREAST RECONSTRUCTION WITH EXCISION OF EXCESS TISSUE / LATERAL BREAST BILATERAL NIPPLE RECONSTRUCTION WITH FTSG  performed by Porter Berumen III, MD at [facility] AMBULATORY OR    BREAST SURGERY Right 8/13/15    RIGHT axillary SLNB and port a cath insertion    COLONOSCOPY  12/12/11    Dr. Chang    COLONOSCOPY N/A 8/31/2022    COLONOSCOPY performed by Miguel Chang MD at [facility] ENDOSCOPY    CT BIOPSY

## 2024-01-22 NOTE — TELEPHONE ENCOUNTER
Patient was in for routine follow-up today.  Saw Dr. Briceño for a LEFT axillary sebaceous cyst a few months ago.  Would like the cyst excised due to it's location and it's uncomfortable.  Will forward to Dr. Briceño for surgical management.

## 2024-02-07 ENCOUNTER — TELEPHONE (OUTPATIENT)
Age: 64
End: 2024-02-07

## 2024-02-07 ENCOUNTER — OFFICE VISIT (OUTPATIENT)
Age: 64
End: 2024-02-07
Payer: COMMERCIAL

## 2024-02-07 VITALS — HEIGHT: 68 IN | WEIGHT: 167 LBS | BODY MASS INDEX: 25.31 KG/M2

## 2024-02-07 DIAGNOSIS — L72.3 SEBACEOUS CYST OF LEFT AXILLA: Primary | ICD-10-CM

## 2024-02-07 PROCEDURE — 99213 OFFICE O/P EST LOW 20 MIN: CPT | Performed by: SURGERY

## 2024-02-07 NOTE — PROGRESS NOTES
HISTORY OF PRESENT ILLNESS  Laura Dennis is a 63 y.o. female     HPI ESTABLISHED patient here for pre-op appointment to discuss removal of LEFT breast cyst.     Breast history -  Referring - Dr. Carrie Monzon - [facility]  RIGHT breast IDC T2N0 triple negative  Completed neoadjuvant chemotherapy. Followed by Dr. Sebastian (originally 3.5 cm, 1.8 cm post neoadjuvant chemo)  2/23/16- BILATERAL mastectomy with reconstruction, RIGHT SLNB and port removal - Dr. Briceño  No radiation.   5/2016- BILATERAL implants placed by Dr. Berumen.  12/2016- BILATERAL revision breast reconstruction.   7/2023 - LEFT axillary sebaceous cyst    Review of Systems   All other systems reviewed and are negative.        Physical Exam  Vitals and nursing note reviewed.   Chest:          Comments: 1 x 1 cm sebaceous cyst left axilla           ASSESSMENT and PLAN   Diagnosis Orders   1. Sebaceous cyst of left axilla          - will excise and schedule    20 minutes was spent with patient on counseling and coordination of care.

## 2024-02-07 NOTE — H&P (VIEW-ONLY)
HISTORY OF PRESENT ILLNESS  Laura Dennis is a 63 y.o. female     HPI ESTABLISHED patient here for pre-op appointment to discuss removal of LEFT breast cyst.     Breast history -  Referring - Dr. Carrie Monzon - [facility]  RIGHT breast IDC T2N0 triple negative  Completed neoadjuvant chemotherapy. Followed by Dr. Sebastian (originally 3.5 cm, 1.8 cm post neoadjuvant chemo)  2/23/16- BILATERAL mastectomy with reconstruction, RIGHT SLNB and port removal - Dr. Briceño  No radiation.   5/2016- BILATERAL implants placed by Dr. Berumen.  12/2016- BILATERAL revision breast reconstruction.   7/2023 - LEFT axillary sebaceous cyst    Review of Systems   All other systems reviewed and are negative.        Physical Exam  Vitals and nursing note reviewed.   Chest:          Comments: 1 x 1 cm sebaceous cyst left axilla           ASSESSMENT and PLAN   Diagnosis Orders   1. Sebaceous cyst of left axilla          - will excise and schedule    20 minutes was spent with patient on counseling and coordination of care.

## 2024-02-09 ENCOUNTER — CLINICAL DOCUMENTATION (OUTPATIENT)
Age: 64
End: 2024-02-09

## 2024-02-19 ENCOUNTER — PREP FOR PROCEDURE (OUTPATIENT)
Age: 64
End: 2024-02-19

## 2024-02-19 ENCOUNTER — CLINICAL DOCUMENTATION (OUTPATIENT)
Age: 64
End: 2024-02-19

## 2024-02-19 DIAGNOSIS — L72.3 SEBACEOUS CYST OF LEFT AXILLA: Primary | ICD-10-CM

## 2024-02-19 NOTE — PROGRESS NOTES
Patient Surgery Information Sheet      Patient Name:  Laura Dennis  Surgery Date:  March 7, 2024    Type of Surgery:  LEFT AXILLA EXCISION OF SEBACEOUS CYST     Estimated arrival time 6:00 AM    Arrival time will be confirmed the afternoon before your surgery.    Pre-procedure: Not Applicable    Pre-Operative Testing Department will call to schedule pre-op testing appointment if needed before surgery    Hospital:  [facility]   Address:  [address] [place], [place] [id number]  Check in location:  Medical Office Building III, the second surgery center past the Emergency Room    Pre-Operative Instructions:  Will be given at the pre-op appointment.    Special Instructions if needed:     NPO (nothing by mouth) or drinking after midnight the night before Surgery  Patient may shower the morning of, do not use any lotion, deodorant, powders, perfumes or makeup  Patient will need  the morning of surgery     POST OPERATIVE VISIT: 4/1/2024 at 10:45 am with Keyona Cheung    Surgery Scheduler:   Spring Kang

## 2024-02-29 NOTE — PERIOP NOTE
[facility]  Ambulatory Surgery Unit  Pre-operative Instructions    Surgery/Procedure Date  Thursday, March 7, 2024            Tentative Arrival Time TBD      1. On the day of your surgery/procedure, please report to the Ambulatory Surgery Unit Registration Desk and sign in at your designated time. The Ambulatory Surgery Unit is located in [facility] on the [facility] side of the [facility] [place] across from the [facility]. Please have all of your health insurance cards, co-payment, and a photo ID.    **TWO adults may accompany you the day of the procedure.  We have limited seating available.  If our waiting room is at capacity, your ride may be asked to remain in their vehicle.  No one under 15 is allowed in the waiting room.      2. You must have someone stay here during the whole procedure, and able to drive you home, as you should not drive a car for 24 hours following anesthesia. Please make arrangements for a responsible adult friend or family member to stay with you for at least the first 24 hours after your surgery.    3. Do not have anything to eat or drink (including water, gum, mints, coffee, juice) after 11:59 PM, Wednesday. This may not apply to medications prescribed by your physician.  (Please note below the special instructions with medications to take the morning of surgery, if applicable.)    4. We recommend you do not drink any alcoholic beverages for 24 hours before and after your surgery.    5. Contact your surgeon’s office for instructions on the following medications: non-steroidal anti-inflammatory drugs (i.e. Advil, Aleve), vitamins, and supplements. (Some surgeon’s will want you to stop these medications prior to surgery and others may allow you to take them)   **If you are currently taking Plavix, Coumadin, Aspirin and/or other blood-thinning agents, contact your surgeon for instructions.** Your surgeon will partner with the physician prescribing these

## 2024-03-05 ENCOUNTER — HOSPITAL ENCOUNTER (OUTPATIENT)
Facility: HOSPITAL | Age: 64
Discharge: HOME | End: 2024-03-08
Payer: COMMERCIAL

## 2024-03-05 VITALS
RESPIRATION RATE: 18 BRPM | WEIGHT: 162.26 LBS | HEART RATE: 61 BPM | SYSTOLIC BLOOD PRESSURE: 114 MMHG | HEIGHT: 68 IN | TEMPERATURE: 98.4 F | DIASTOLIC BLOOD PRESSURE: 71 MMHG | BODY MASS INDEX: 24.59 KG/M2

## 2024-03-05 RX ORDER — LORATADINE 10 MG/1
10 CAPSULE, LIQUID FILLED ORAL
COMMUNITY

## 2024-03-05 RX ORDER — DIPHENHYDRAMINE HCL 25 MG
25 TABLET ORAL NIGHTLY PRN
COMMUNITY

## 2024-03-05 ASSESSMENT — PAIN - FUNCTIONAL ASSESSMENT: PAIN_FUNCTIONAL_ASSESSMENT: PREVENTS OR INTERFERES SOME ACTIVE ACTIVITIES AND ADLS

## 2024-03-05 ASSESSMENT — PAIN SCALES - GENERAL: PAINLEVEL_OUTOF10: 1

## 2024-03-05 ASSESSMENT — PAIN DESCRIPTION - DESCRIPTORS: DESCRIPTORS: ACHING

## 2024-03-05 ASSESSMENT — PAIN DESCRIPTION - LOCATION: LOCATION: KNEE

## 2024-03-05 ASSESSMENT — PAIN DESCRIPTION - ORIENTATION: ORIENTATION: RIGHT

## 2024-03-05 NOTE — PERIOP NOTE
INSTRUCTIONS GIVEN AND REVIEWED, 2 BOTTLES OF SOAP GIVEN, PT GIVEN TIME TO ASK QUESTIONS     NO LABS NEEDED AT THIS TIME

## 2024-03-05 NOTE — PERIOP NOTE
[facility]  PREOPERATIVE INSTRUCTIONS    Surgery Date:   03/07/2024    Your surgeon's office or [facility] staff will call you between 4 PM- 8 PM the day before surgery with your arrival time. If your surgery is on a Monday, you will receive a call the preceding Friday. If your surgeon's office has given you, your arrival time then go by that time.    Please report to [facility] Patient Access/Admitting on the 1st floor.  Bring your insurance card, photo identification, and any copayment ( if applicable).   If you are going home the same day of your surgery, you must have a responsible adult to drive you home. You need to have a responsible adult to stay with you the first 24 hours after surgery and you should not drive a car for 24 hours following your surgery.  If you are being admitted to the hospital, please leave personal belongings/luggage in your car until you have an assigned hospital room number.  Do NOT drink alcohol or smoke 24 hours before surgery. STOP smoking for 14 days prior as it helps with breathing and healing after surgery.  Please wear comfortable clothes. Wear your glasses instead of contacts. We ask that all money, jewelry and valuables be left at home. Wear no make up, particularly mascara, the day of surgery.    All body piercings, rings, and jewelry need to be removed and left at home. Remove all nail polish except for clear. Please wear your hair loose or down, no pony-tails, buns, or any metal hair accessories. You may wear deodorant, unless having breast surgery.  Do not shave any body area within 24 hours of your surgery.  Please follow all instructions to avoid any potential surgical cancellation.  Should your physical condition change, (i.e. fever, cold, flu, etc.) please notify your surgeon as soon as possible.  It is important to be on time. If a situation occurs where you may be delayed, please call:  [phone] / [phone] on the day of surgery.  The Preadmission

## 2024-03-06 ENCOUNTER — CLINICAL DOCUMENTATION (OUTPATIENT)
Age: 64
End: 2024-03-06

## 2024-03-07 ENCOUNTER — HOSPITAL ENCOUNTER (OUTPATIENT)
Facility: HOSPITAL | Age: 64
Setting detail: OUTPATIENT SURGERY
Discharge: HOME | End: 2024-03-07
Attending: SURGERY | Admitting: SURGERY
Payer: COMMERCIAL

## 2024-03-07 ENCOUNTER — ANESTHESIA (OUTPATIENT)
Facility: HOSPITAL | Age: 64
End: 2024-03-07

## 2024-03-07 ENCOUNTER — ANESTHESIA EVENT (OUTPATIENT)
Facility: HOSPITAL | Age: 64
End: 2024-03-07

## 2024-03-07 VITALS
DIASTOLIC BLOOD PRESSURE: 74 MMHG | HEIGHT: 68 IN | BODY MASS INDEX: 24.4 KG/M2 | WEIGHT: 161 LBS | OXYGEN SATURATION: 98 % | HEART RATE: 48 BPM | TEMPERATURE: 97.5 F | SYSTOLIC BLOOD PRESSURE: 145 MMHG | RESPIRATION RATE: 10 BRPM

## 2024-03-07 DIAGNOSIS — G89.18 PAIN FOLLOWING SURGERY OR PROCEDURE: ICD-10-CM

## 2024-03-07 DIAGNOSIS — L72.3 SEBACEOUS CYST OF LEFT AXILLA: ICD-10-CM

## 2024-03-07 PROCEDURE — 2500000003 HC RX 250 WO HCPCS: Performed by: SURGERY

## 2024-03-07 PROCEDURE — 3700000001 HC ADD 15 MINUTES (ANESTHESIA): Performed by: SURGERY

## 2024-03-07 PROCEDURE — 7100000000 HC PACU RECOVERY - FIRST 15 MIN: Performed by: SURGERY

## 2024-03-07 PROCEDURE — 88304 TISSUE EXAM BY PATHOLOGIST: CPT

## 2024-03-07 PROCEDURE — 6360000002 HC RX W HCPCS: Performed by: SURGERY

## 2024-03-07 PROCEDURE — 7100000001 HC PACU RECOVERY - ADDTL 15 MIN: Performed by: SURGERY

## 2024-03-07 PROCEDURE — 3600000011 HC SURGERY LEVEL 1  ADDTL 15MIN: Performed by: SURGERY

## 2024-03-07 PROCEDURE — 3700000000 HC ANESTHESIA ATTENDED CARE: Performed by: SURGERY

## 2024-03-07 PROCEDURE — 3600000001 HC SURGERY LEVEL 1  BASE: Performed by: SURGERY

## 2024-03-07 PROCEDURE — 2709999900 HC NON-CHARGEABLE SUPPLY: Performed by: SURGERY

## 2024-03-07 RX ORDER — ONDANSETRON 2 MG/ML
INJECTION INTRAMUSCULAR; INTRAVENOUS PRN
Status: DISCONTINUED | OUTPATIENT
Start: 2024-03-07 | End: 2024-03-07 | Stop reason: SDUPTHER

## 2024-03-07 RX ORDER — TRAMADOL HYDROCHLORIDE 50 MG/1
50 TABLET ORAL EVERY 6 HOURS PRN
Qty: 12 TABLET | Refills: 0 | Status: SHIPPED | OUTPATIENT
Start: 2024-03-07 | End: 2024-03-10

## 2024-03-07 RX ORDER — FENTANYL CITRATE 50 UG/ML
INJECTION, SOLUTION INTRAMUSCULAR; INTRAVENOUS PRN
Status: DISCONTINUED | OUTPATIENT
Start: 2024-03-07 | End: 2024-03-07 | Stop reason: SDUPTHER

## 2024-03-07 RX ORDER — DEXAMETHASONE SODIUM PHOSPHATE 4 MG/ML
INJECTION, SOLUTION INTRA-ARTICULAR; INTRALESIONAL; INTRAMUSCULAR; INTRAVENOUS; SOFT TISSUE PRN
Status: DISCONTINUED | OUTPATIENT
Start: 2024-03-07 | End: 2024-03-07 | Stop reason: SDUPTHER

## 2024-03-07 RX ORDER — LIDOCAINE HYDROCHLORIDE 20 MG/ML
INJECTION, SOLUTION EPIDURAL; INFILTRATION; INTRACAUDAL; PERINEURAL PRN
Status: DISCONTINUED | OUTPATIENT
Start: 2024-03-07 | End: 2024-03-07 | Stop reason: SDUPTHER

## 2024-03-07 RX ADMIN — FENTANYL CITRATE 25 MCG: 50 INJECTION, SOLUTION INTRAMUSCULAR; INTRAVENOUS at 09:29

## 2024-03-07 RX ADMIN — DEXAMETHASONE SODIUM PHOSPHATE 4 MG: 4 INJECTION, SOLUTION INTRA-ARTICULAR; INTRALESIONAL; INTRAMUSCULAR; INTRAVENOUS; SOFT TISSUE at 09:30

## 2024-03-07 RX ADMIN — ONDANSETRON 4 MG: 2 INJECTION INTRAMUSCULAR; INTRAVENOUS at 09:35

## 2024-03-07 RX ADMIN — FENTANYL CITRATE 50 MCG: 50 INJECTION, SOLUTION INTRAMUSCULAR; INTRAVENOUS at 09:43

## 2024-03-07 RX ADMIN — FENTANYL CITRATE 25 MCG: 50 INJECTION, SOLUTION INTRAMUSCULAR; INTRAVENOUS at 09:21

## 2024-03-07 RX ADMIN — LIDOCAINE HYDROCHLORIDE 40 MG: 20 INJECTION, SOLUTION EPIDURAL; INFILTRATION; INTRACAUDAL; PERINEURAL at 09:20

## 2024-03-07 ASSESSMENT — PAIN SCALES - GENERAL
PAINLEVEL_OUTOF10: 0

## 2024-03-07 ASSESSMENT — PAIN - FUNCTIONAL ASSESSMENT: PAIN_FUNCTIONAL_ASSESSMENT: 0-10

## 2024-03-07 NOTE — ANESTHESIA PRE PROCEDURE
Department of Anesthesiology  Preprocedure Note       Name:  Laura Dennis   Age:  64 y.o.  :  1960                                          MRN:  [id number]         Date:  3/7/2024      Surgeon: Surgeon(s):  Anne Briceño MD    Procedure: Procedure(s):  LEFT AXILLA EXCISION OF SEBACEOUS CYST    Medications prior to admission:   Prior to Admission medications    Medication Sig Start Date End Date Taking? Authorizing Provider   loratadine (CLARITIN) 10 MG capsule Take 1 capsule by mouth nightly as needed    Reagan Chan MD   diphenhydrAMINE (BENADRYL) 25 MG tablet Take 1 tablet by mouth nightly as needed for Itching    Reagan Chan MD   aspirin (ASPIRIN 81) 81 MG chewable tablet Take 1 tablet by mouth nightly    Reagan Chan MD   valsartan (DIOVAN) 80 MG tablet Take 1 tablet by mouth at bedtime 10/17/23   Reagan Chan MD   vitamin D (CHOLECALCIFEROL) 25 MCG (1000 UT) TABS tablet Take 1 tablet by mouth nightly    Automatic Reconciliation, Ar   denosumab (PROLIA) 60 MG/ML SOSY SC injection Inject 1 mL into the skin every 6 months IN 2024 NEXT DOSE    Automatic Reconciliation, Ar       Current medications:    No current facility-administered medications for this encounter.       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

## 2024-03-07 NOTE — PERIOP NOTE
I have reviewed discharge instructions with the  spouse, Calvin  The  spouseverbalized understanding. All questions addressed at this time. A paper copy of these instructions have been given to the patient to take home.

## 2024-03-07 NOTE — BRIEF OP NOTE
Brief Postoperative Note      Patient: Laura Dennis  YOB: 1960  MRN: [id number]    Date of Procedure: 3/7/2024    Pre-Op Diagnosis Codes:     * Sebaceous cyst of left axilla [L72.3]    Post-Op Diagnosis: Same       Procedure(s):  LEFT AXILLA EXCISION OF SEBACEOUS CYST    Surgeon(s):  Anne Briceño MD    Assistant:  Surgical Assistant: David Levi    Anesthesia: Monitor Anesthesia Care    Estimated Blood Loss (mL): Minimal    Complications: None    Specimens:   ID Type Source Tests Collected by Time Destination   1 : left axillary mass Tissue Axillary SURGICAL PATHOLOGY Anne Briceño MD 3/7/2024 0931        Implants:  * No implants in log *      Drains: * No LDAs found *    Findings: lesion removed         Electronically signed by Anne Briceño MD on 3/7/2024 at 9:38 AM

## 2024-03-07 NOTE — OP NOTE
[address]  [address]                            OPERATIVE REPORT      PATIENT NAME: YANELI ORTA             : 1960  MED REC NO: [id number]                       ROOM: [facility]  ACCOUNT NO: [id number]                       ADMIT DATE: 2024  PROVIDER: Anne Groves MD    DATE OF SERVICE:  2024    PREOPERATIVE DIAGNOSES:  Left axillary sebaceous cyst, 2 x 2 cm.    POSTOPERATIVE DIAGNOSES:  Left axillary sebaceous cyst, 2 x 2 cm.    PROCEDURES PERFORMED:  Left axillary excision of sebaceous cyst, 2 x 2 cm.    SURGEON:  Anne Groves MD    ASSISTANT:  David Levi S.A.    ANESTHESIA:  MAC.    ESTIMATED BLOOD LOSS:  Minimal.    SPECIMENS REMOVED:  One left axillary mass.    INTRAOPERATIVE FINDINGS:  Lesion removed.     COMPLICATIONS:  None.    IMPLANTS:  None.    INDICATIONS:  64-year-old female who had what appeared to be a 2 x 2 cm sebaceous cyst in the left axilla.  She needs excision of this.    DESCRIPTION OF PROCEDURE:  The patient was seen in the preop holding area where surgical site was marked by surgeon.  Informed consent was obtained.  She was taken to the operating room and laid in supine position where MAC anesthesia was induced.  Left axilla was prepped and draped in the usual fashion.  A time-out was performed.  Attention was turned to the left axilla where an elliptical incision was made with a 15 blade after injection of 20 mL of local anesthetic.  Bovie cautery was used to dissect back and around the soft tissue and the lesion was sent for permanent pathology.  The cavity was irrigated.  Another 20 mL of local anesthetic was injected.  The incision was closed with interrupted 3-0 Vicryl and 4-0 subcuticular Monocryl.  Skin glue was placed on the incision.  All sponge, needle, and instrument counts were correct.  The patient went to Recovery in stable condition.    DRAINS:  None.        ANNE GROVES

## 2024-03-07 NOTE — INTERVAL H&P NOTE
Update History & Physical    The patient's History and Physical of February 7, 2024 was reviewed with the patient and I examined the patient. There was no change. The surgical site was confirmed by the patient and me.     Plan: The risks, benefits, expected outcome, and alternative to the recommended procedure have been discussed with the patient. Patient understands and wants to proceed with the procedure.     Electronically signed by Anne Briceño MD on 3/7/2024 at 7:29 AM

## 2024-03-07 NOTE — DISCHARGE INSTRUCTIONS
Discharge Instructions from Dr. Briceño    I will call you with the pathology results, typically within 1 week from today.  You may shower, but no hot tubs, swimming pools, or baths until your incision is healed.  No heavy lifting with the affected extremity (nothing greater than 5 pounds), and limit its use for the next 4-5 days.  You may use an ice pack for comfort for the next couple of days, but do not place ice directly on the skin.  Rather, use a towel or clothing to serve as a barrier between skin and ice to prevent injury.  If I placed a drain, follow the drain instructions provided, especially as you keep a record of the drain output.  Follow medication instructions carefully.     You will have bruising and swelling  Watch for signs of infection as listed below.  Redness  Swelling  Drainage from the incision or from your nipple that appears infected  Fever over 101.5 degrees for consecutive readings, or over 99.5 if you are currently undergoing chemotherapy.  Call our office (number is below) for a follow-up appointment.  If you have any problems, our phone number is [phone]

## 2024-03-07 NOTE — ANESTHESIA POSTPROCEDURE EVALUATION
Department of Anesthesiology  Postprocedure Note    Patient: Laura Dennis  MRN: [id number]  YOB: 1960  Date of evaluation: 3/7/2024    Procedure Summary       Date: 03/07/24 Room / Location: [facility]  / [facility] AMBULATORY OR    Anesthesia Start: 0909 Anesthesia Stop: 0956    Procedure: LEFT AXILLA EXCISION OF SEBACEOUS CYST (Left: Axilla) Diagnosis:       Sebaceous cyst of left axilla      (Sebaceous cyst of left axilla [L72.3])    Surgeons: Anne Briceño MD Responsible Provider: Hortencia Davies DO    Anesthesia Type: MAC ASA Status: 3            Anesthesia Type: MAC    Kulwinder Phase I: Kulwinder Score: 10    Kulwinder Phase II:      Anesthesia Post Evaluation    Patient location during evaluation: PACU  Level of consciousness: awake  Airway patency: patent  Nausea & Vomiting: no nausea  Cardiovascular status: hemodynamically stable  Respiratory status: acceptable  Hydration status: stable  Multimodal analgesia pain management approach  Pain management: adequate    No notable events documented.

## 2024-04-01 ENCOUNTER — OFFICE VISIT (OUTPATIENT)
Age: 64
End: 2024-04-01

## 2024-04-01 VITALS — BODY MASS INDEX: 24.4 KG/M2 | HEIGHT: 68 IN | WEIGHT: 161 LBS

## 2024-04-01 DIAGNOSIS — L72.3 SEBACEOUS CYST OF LEFT AXILLA: Primary | ICD-10-CM

## 2024-04-01 PROCEDURE — 99024 POSTOP FOLLOW-UP VISIT: CPT | Performed by: NURSE PRACTITIONER

## 2024-04-01 NOTE — PROGRESS NOTES
HISTORY OF PRESENT ILLNESS  Laura Dennis is a 64 y.o. female     HPI  Established patient presents for a post-op visit.            Breast history -  Referring - Dr. Carrie Monzon - [facility]  RIGHT breast IDC T2N0 triple negative  Completed neoadjuvant chemotherapy. Followed by Dr. Sebastian (originally 3.5 cm, 1.8 cm post neoadjuvant chemo)  2/23/16- BILATERAL mastectomy with reconstruction, RIGHT SLNB and port removal - Dr. Briceño  No radiation.   5/2016- BILATERAL implants placed by Dr. Berumen.  12/2016- BILATERAL revision breast reconstruction.   7/2023 - LEFT axillary sebaceous cyst  3/7/24 - Soft tissue, left axilla, biopsy - Dr. Briceño  Epidermal inclusion cyst.         Family history -   No family history of breast or ovarian cancer.   Patient has VUS BRCA 1 ([place]).        OB History    No obstetric history on file.      Obstetric Comments   Menarche:  13.   LMP: 4/8/15.  # of Children:  3.  Age at Delivery of First Child:  18.   Hysterectomy/oophorectomy:  NO/NO.  Breast Bx:  yes.  Hx of Breast Feeding:  no.  BCP:  yes. Hormone therapy:  no.                    Review of Systems      Physical Exam  Chest:              Ht 1.727 m (5' 8\")   Wt 73 kg (161 lb)   BMI 24.48 kg/m²         ASSESSMENT and PLAN   Diagnosis Orders   1. Sebaceous cyst of left axilla            Incision CDI and healing well.    Reviewed pathology.    Cleared for all activity; encouraged a supportive bra.    Would like to continue annual CBE here.    RTC in 1/2025 or sooner PRN.  She is comfortable with this plan.  All questions answered and she stated understanding.

## 2024-04-03 ENCOUNTER — TELEPHONE (OUTPATIENT)
Age: 64
End: 2024-04-03

## 2024-04-03 NOTE — TELEPHONE ENCOUNTER
Offered next available virtual visit on 4/9/24. Patient states that the appointment is too far out. Informed patient that if Tuesday is too far out that [facility] does have an urgent care. Patient states will try urgent care.

## 2024-04-03 NOTE — TELEPHONE ENCOUNTER
Pt has uri, chills, headache, cough, sore throat.    Pt would like to be seen Friday perhaps VV.  No VV until Tuesday.     Please call to advise.

## 2024-04-06 ENCOUNTER — OFFICE VISIT (OUTPATIENT)
Age: 64
End: 2024-04-06

## 2024-04-06 VITALS
TEMPERATURE: 98.1 F | HEIGHT: 68 IN | HEART RATE: 60 BPM | DIASTOLIC BLOOD PRESSURE: 73 MMHG | BODY MASS INDEX: 21.79 KG/M2 | SYSTOLIC BLOOD PRESSURE: 109 MMHG | WEIGHT: 143.8 LBS | OXYGEN SATURATION: 97 %

## 2024-04-06 DIAGNOSIS — J20.9 ACUTE BRONCHITIS WITH BRONCHOSPASM: Primary | ICD-10-CM

## 2024-04-06 RX ORDER — PREDNISONE 5 MG/1
TABLET ORAL
Qty: 1 EACH | Refills: 0 | Status: SHIPPED | OUTPATIENT
Start: 2024-04-06

## 2024-04-06 RX ORDER — DEXTROMETHORPHAN HYDROBROMIDE AND PROMETHAZINE HYDROCHLORIDE 15; 6.25 MG/5ML; MG/5ML
5 SYRUP ORAL NIGHTLY PRN
Qty: 50 ML | Refills: 0 | Status: SHIPPED | OUTPATIENT
Start: 2024-04-06

## 2024-04-06 RX ORDER — ALBUTEROL SULFATE 90 UG/1
2 AEROSOL, METERED RESPIRATORY (INHALATION) 4 TIMES DAILY PRN
Qty: 18 G | Refills: 0 | Status: SHIPPED | OUTPATIENT
Start: 2024-04-06

## 2024-04-06 RX ORDER — AZITHROMYCIN 250 MG/1
250 TABLET, FILM COATED ORAL SEE ADMIN INSTRUCTIONS
Qty: 6 TABLET | Refills: 0 | Status: SHIPPED | OUTPATIENT
Start: 2024-04-06 | End: 2024-04-11

## 2024-04-06 ASSESSMENT — ENCOUNTER SYMPTOMS
DIARRHEA: 1
COUGH: 1
WHEEZING: 0
SORE THROAT: 0
SINUS PAIN: 1
ABDOMINAL PAIN: 0

## 2024-04-06 NOTE — PATIENT INSTRUCTIONS
Mucinex Fast max 2 tab 4 times/ day - day/ night pack   Or Dayquil/Nyquil  Zyrtec/ Allegra daily   Saline sinus rinse

## 2024-04-06 NOTE — PROGRESS NOTES
Chief Complaint   Patient presents with    URI     Forceful cough, chills, fever, sinus pain ongoing for 2 weeks. Has progressively gotten worse. Sore throat, post nasal drip, headache and some nausea and diarrhea.            URI   This is a new problem. The current episode started in the past 7 days. The problem has been unchanged. There has been no fever. Associated symptoms include congestion, coughing, diarrhea and sinus pain. Pertinent negatives include no abdominal pain, sore throat or wheezing. She has tried decongestant and antihistamine for the symptoms.       Past Medical History:   Diagnosis Date    ARF (acute renal failure) ([facility]) 3/12/11    as of 8/11/15 pt states has resolved and is not currently being folllowed by specialist    Breast cancer ([facility]) 07/2015    triple (-), bilateral mastectomy, chemotherapy completed,last treatment 1/2016    Chest pain     Per Pt on Rt side and arm pit only, resolves with Tylenol and states she believes it is related to her tumor    Colon polyp     Dizziness     pt states it is vertigo    Echocardiogram abnormal 2/19/16    Family hx of colon cancer     History of chemotherapy     AUG 2015- JAN 2016    Nephrotic syndrome 3/12/2011    biopsy inconclusive but steroid responsive    Rheumatoid arthritis ([facility]) 2016    Sjogren syndrome, unspecified ([facility]) 2016    TMJ (temporomandibular joint disorder)     Toothache     being treated for tooth ache x 1 week. pain better with treatment    Vertigo        Past Surgical History:   Procedure Laterality Date    ARM SURGERY Left 3/7/2024    LEFT AXILLA EXCISION OF SEBACEOUS CYST performed by Anne Briceño MD at [facility] AMBULATORY OR    BREAST BIOPSY Right 7/2015    BREAST RECONSTRUCTION Bilateral 5/23/2016    REMOVAL TISSUE EXPANDERS BILATERAL BREAST / RECONSTRUCTION BILATERAL BREASTS / GEL IMPLANTS  performed by Porter Berumen III, MD at [facility] AMBULATORY OR    BREAST RECONSTRUCTION Bilateral 2/23/2016    BREAST RECONSTRUCTION performed by

## 2024-11-14 ENCOUNTER — TELEMEDICINE (OUTPATIENT)
Age: 64
End: 2024-11-14

## 2024-11-14 DIAGNOSIS — J20.9 ACUTE BRONCHITIS WITH BRONCHOSPASM: ICD-10-CM

## 2024-11-14 DIAGNOSIS — Z90.13 S/P BILATERAL MASTECTOMY: Primary | ICD-10-CM

## 2024-11-14 DIAGNOSIS — M35.00 SJOGREN'S SYNDROME, WITH UNSPECIFIED ORGAN INVOLVEMENT (HCC): ICD-10-CM

## 2024-11-14 RX ORDER — PREDNISONE 5 MG/1
TABLET ORAL
Qty: 1 EACH | Refills: 0 | Status: SHIPPED | OUTPATIENT
Start: 2024-11-14

## 2024-11-14 RX ORDER — AZITHROMYCIN 250 MG/1
TABLET, FILM COATED ORAL
Qty: 6 TABLET | Refills: 0 | Status: SHIPPED | OUTPATIENT
Start: 2024-11-14 | End: 2024-11-24

## 2024-11-14 RX ORDER — DEXTROMETHORPHAN HYDROBROMIDE AND PROMETHAZINE HYDROCHLORIDE 15; 6.25 MG/5ML; MG/5ML
5 SYRUP ORAL NIGHTLY PRN
Qty: 50 ML | Refills: 0 | Status: SHIPPED | OUTPATIENT
Start: 2024-11-14

## 2024-11-14 SDOH — ECONOMIC STABILITY: FOOD INSECURITY: WITHIN THE PAST 12 MONTHS, THE FOOD YOU BOUGHT JUST DIDN'T LAST AND YOU DIDN'T HAVE MONEY TO GET MORE.: NEVER TRUE

## 2024-11-14 SDOH — ECONOMIC STABILITY: INCOME INSECURITY: HOW HARD IS IT FOR YOU TO PAY FOR THE VERY BASICS LIKE FOOD, HOUSING, MEDICAL CARE, AND HEATING?: NOT HARD AT ALL

## 2024-11-14 SDOH — ECONOMIC STABILITY: FOOD INSECURITY: WITHIN THE PAST 12 MONTHS, YOU WORRIED THAT YOUR FOOD WOULD RUN OUT BEFORE YOU GOT MONEY TO BUY MORE.: NEVER TRUE

## 2024-11-14 ASSESSMENT — PATIENT HEALTH QUESTIONNAIRE - PHQ9
SUM OF ALL RESPONSES TO PHQ QUESTIONS 1-9: 0
SUM OF ALL RESPONSES TO PHQ9 QUESTIONS 1 & 2: 0
2. FEELING DOWN, DEPRESSED OR HOPELESS: NOT AT ALL
1. LITTLE INTEREST OR PLEASURE IN DOING THINGS: NOT AT ALL
SUM OF ALL RESPONSES TO PHQ QUESTIONS 1-9: 0

## 2024-11-14 NOTE — PROGRESS NOTES
\"Have you been to the ER, urgent care clinic since your last visit?  Hospitalized since your last visit?\"    NO    “Have you seen or consulted any other health care providers outside our system since your last visit?”    NO     “Have you had a pap smear?”    NO    No cervical cancer screening on file            
breathing or respiratory distress        [] Abnormal -      Musculoskeletal:   [x] Normal gait with no signs of ataxia         [x] Normal range of motion of neck        [] Abnormal -     Neurological:        [x] No Facial Asymmetry (Cranial nerve 7 motor function) (limited exam due to video visit)          [x] No gaze palsy        [] Abnormal -          Skin:        [x] No significant exanthematous lesions or discoloration noted on facial skin         [] Abnormal -            Psychiatric:       [x] Normal Affect [] Abnormal -        [x] No Hallucinations    Other pertinent observable physical exam findings:-             --Susu Harper MD

## 2025-01-27 ENCOUNTER — OFFICE VISIT (OUTPATIENT)
Age: 65
End: 2025-01-27
Payer: COMMERCIAL

## 2025-01-27 VITALS — WEIGHT: 170 LBS | BODY MASS INDEX: 25.76 KG/M2 | HEIGHT: 68 IN

## 2025-01-27 DIAGNOSIS — Z90.13 STATUS POST BILATERAL MASTECTOMY: ICD-10-CM

## 2025-01-27 DIAGNOSIS — Z85.3 HISTORY OF RIGHT BREAST CANCER: Primary | ICD-10-CM

## 2025-01-27 PROCEDURE — 99213 OFFICE O/P EST LOW 20 MIN: CPT | Performed by: NURSE PRACTITIONER

## 2025-01-27 NOTE — PROGRESS NOTES
HISTORY OF PRESENT ILLNESS  Laura Dennis is a 64 y.o. female     HPI Established patient presents for follow-up for RIGHT breast cancer s/p BILATERAL mastectomy.  No concerns today.          Breast history -  Referring - Dr. Carrie Monzon - [facility]  RIGHT breast IDC T2N0 triple negative  Completed neoadjuvant chemotherapy. Followed by Dr. Sebastian (originally 3.5 cm, 1.8 cm post neoadjuvant chemo)  2/23/16- BILATERAL mastectomy with reconstruction, RIGHT SLNB and port removal - Dr. Briceño  No radiation.   5/2016- BILATERAL implants placed by Dr. Berumen.  12/2016- BILATERAL revision breast reconstruction.   7/2023 - LEFT axillary sebaceous cyst  3/7/24 - Soft tissue, left axilla, biopsy - Dr. Briceño  Epidermal inclusion cyst.         Family history -   No family history of breast or ovarian cancer.   Patient has VUS BRCA 1 ([place]).        OB History    No obstetric history on file.      Obstetric Comments   Menarche:  13.   LMP: 4/8/15.  # of Children:  3.  Age at Delivery of First Child:  18.   Hysterectomy/oophorectomy:  NO/NO.  Breast Bx:  yes.  Hx of Breast Feeding:  no.  BCP:  yes. Hormone therapy:  no.                    Past Surgical History:   Procedure Laterality Date    ARM SURGERY Left 3/7/2024    LEFT AXILLA EXCISION OF SEBACEOUS CYST performed by Anne Briceño MD at [facility] AMBULATORY OR    BREAST BIOPSY Right 7/2015    BREAST RECONSTRUCTION Bilateral 5/23/2016    REMOVAL TISSUE EXPANDERS BILATERAL BREAST / RECONSTRUCTION BILATERAL BREASTS / GEL IMPLANTS  performed by Porter Berumen III, MD at [facility] AMBULATORY OR    BREAST RECONSTRUCTION Bilateral 2/23/2016    BREAST RECONSTRUCTION performed by Porter Berumen III, MD at [facility] AMBULATORY OR    BREAST RECONSTRUCTION Bilateral 12/19/2016    REVISION BILATERAL BREAST RECONSTRUCTION WITH EXCISION OF EXCESS TISSUE / LATERAL BREAST BILATERAL NIPPLE RECONSTRUCTION WITH FTSG  performed by Porter Berumen III, MD at [facility] AMBULATORY OR    BREAST SURGERY Right 8/13/15

## 2025-02-05 ENCOUNTER — TELEPHONE (OUTPATIENT)
Age: 65
End: 2025-02-05

## 2025-02-05 NOTE — TELEPHONE ENCOUNTER
Pt called in states believes has sinus infection. Pt experiencing pressure in back of neck and swelling on right side of jaw closer to ear.     Taking otc mucinex no relief.    Please call to schedule

## 2025-02-06 SDOH — ECONOMIC STABILITY: TRANSPORTATION INSECURITY
IN THE PAST 12 MONTHS, HAS THE LACK OF TRANSPORTATION KEPT YOU FROM MEDICAL APPOINTMENTS OR FROM GETTING MEDICATIONS?: NO

## 2025-02-06 SDOH — ECONOMIC STABILITY: FOOD INSECURITY: WITHIN THE PAST 12 MONTHS, THE FOOD YOU BOUGHT JUST DIDN'T LAST AND YOU DIDN'T HAVE MONEY TO GET MORE.: NEVER TRUE

## 2025-02-06 SDOH — ECONOMIC STABILITY: FOOD INSECURITY: WITHIN THE PAST 12 MONTHS, YOU WORRIED THAT YOUR FOOD WOULD RUN OUT BEFORE YOU GOT MONEY TO BUY MORE.: NEVER TRUE

## 2025-02-06 SDOH — ECONOMIC STABILITY: TRANSPORTATION INSECURITY
IN THE PAST 12 MONTHS, HAS LACK OF TRANSPORTATION KEPT YOU FROM MEETINGS, WORK, OR FROM GETTING THINGS NEEDED FOR DAILY LIVING?: NO

## 2025-02-06 SDOH — ECONOMIC STABILITY: INCOME INSECURITY: IN THE LAST 12 MONTHS, WAS THERE A TIME WHEN YOU WERE NOT ABLE TO PAY THE MORTGAGE OR RENT ON TIME?: NO

## 2025-02-07 ENCOUNTER — TELEMEDICINE (OUTPATIENT)
Age: 65
End: 2025-02-07
Payer: COMMERCIAL

## 2025-02-07 DIAGNOSIS — J01.00 ACUTE NON-RECURRENT MAXILLARY SINUSITIS: Primary | ICD-10-CM

## 2025-02-07 PROCEDURE — 99213 OFFICE O/P EST LOW 20 MIN: CPT | Performed by: INTERNAL MEDICINE

## 2025-02-07 RX ORDER — METHYLPREDNISOLONE 4 MG/1
TABLET ORAL
Qty: 1 KIT | Refills: 0 | Status: SHIPPED | OUTPATIENT
Start: 2025-02-07 | End: 2025-02-13

## 2025-02-07 RX ORDER — ASPIRIN 81 MG/1
81 TABLET ORAL DAILY
COMMUNITY

## 2025-02-07 RX ORDER — AZITHROMYCIN 250 MG/1
TABLET, FILM COATED ORAL
Qty: 6 TABLET | Refills: 0 | Status: SHIPPED | OUTPATIENT
Start: 2025-02-07 | End: 2025-02-17

## 2025-02-07 RX ORDER — VALSARTAN 40 MG/1
40 TABLET ORAL NIGHTLY
COMMUNITY
Start: 2025-02-06

## 2025-02-07 RX ORDER — MULTIVIT-MIN/IRON/FOLIC ACID/K 18-600-40
2000 CAPSULE ORAL DAILY
COMMUNITY

## 2025-02-07 ASSESSMENT — PATIENT HEALTH QUESTIONNAIRE - PHQ9
SUM OF ALL RESPONSES TO PHQ QUESTIONS 1-9: 0
SUM OF ALL RESPONSES TO PHQ9 QUESTIONS 1 & 2: 0
1. LITTLE INTEREST OR PLEASURE IN DOING THINGS: NOT AT ALL
SUM OF ALL RESPONSES TO PHQ QUESTIONS 1-9: 0
2. FEELING DOWN, DEPRESSED OR HOPELESS: NOT AT ALL

## 2025-02-07 ASSESSMENT — ENCOUNTER SYMPTOMS
SINUS PAIN: 1
SHORTNESS OF BREATH: 0
SINUS PRESSURE: 1

## 2025-02-07 NOTE — PROGRESS NOTES
05/26/2022     05/26/2022    CO2 21 05/26/2022       Review of Systems   HENT:  Positive for ear pain, sinus pressure and sinus pain.    Respiratory:  Negative for shortness of breath.    Cardiovascular:  Negative for chest pain.   Neurological:  Positive for headaches.         Physical Exam  Constitutional:       General: She is not in acute distress.     Appearance: Normal appearance.   HENT:      Head: Normocephalic and atraumatic.   Eyes:      General:         Right eye: No discharge.         Left eye: No discharge.   Pulmonary:      Effort: Pulmonary effort is normal. No respiratory distress.   Neurological:      Mental Status: She is alert and oriented to person, place, and time. Mental status is at baseline.   Psychiatric:         Mood and Affect: Mood normal.           ASSESSMENT and PLAN   Diagnosis Orders   1. Acute non-recurrent maxillary sinusitis     Will treat with Z-[place] will start Medrol Dosepak on Sunday if no improvement discussed Tylenol for neck symptoms seems to have some muscular neck pain         I have reviewed the note documented by the scribe.  The services provided are my own.  The documentation is accurate   Depression screen reviewed and negative.  Scribed by Elizabeth Izquierdo, as dictated by Dr. Tere Levi.    Current diagnosis and concerns discussed with pt at length. Pt understands risks and benefits or current treatment plan and medications, and accepts the treatment and medication with any possible risks. Pt asks appropriate questions, which were answered. Pt was instructed to call with any concerns or problems.    I have reviewed the note documented by the scribe. The services provided are my own. The documentation is accurate.  Laura Dennis, was evaluated through a synchronous (real-time) audio-video encounter. The patient (or guardian if applicable) is aware that this is a billable service, which includes applicable co-pays. This Virtual Visit was conducted

## 2025-04-06 ENCOUNTER — HOSPITAL ENCOUNTER (EMERGENCY)
Facility: HOSPITAL | Age: 65
Discharge: HOME | End: 2025-04-06
Attending: STUDENT IN AN ORGANIZED HEALTH CARE EDUCATION/TRAINING PROGRAM
Payer: COMMERCIAL

## 2025-04-06 VITALS
SYSTOLIC BLOOD PRESSURE: 151 MMHG | DIASTOLIC BLOOD PRESSURE: 76 MMHG | TEMPERATURE: 98.6 F | RESPIRATION RATE: 12 BRPM | HEART RATE: 62 BPM | OXYGEN SATURATION: 98 %

## 2025-04-06 DIAGNOSIS — M62.838 NECK MUSCLE SPASM: Primary | ICD-10-CM

## 2025-04-06 PROCEDURE — 99283 EMERGENCY DEPT VISIT LOW MDM: CPT

## 2025-04-06 PROCEDURE — 6370000000 HC RX 637 (ALT 250 FOR IP): Performed by: STUDENT IN AN ORGANIZED HEALTH CARE EDUCATION/TRAINING PROGRAM

## 2025-04-06 RX ORDER — DIAZEPAM 5 MG/1
5 TABLET ORAL ONCE
Status: COMPLETED | OUTPATIENT
Start: 2025-04-06 | End: 2025-04-06

## 2025-04-06 RX ORDER — METHOCARBAMOL 750 MG/1
750 TABLET, FILM COATED ORAL 4 TIMES DAILY
Qty: 40 TABLET | Refills: 0 | Status: SHIPPED | OUTPATIENT
Start: 2025-04-06 | End: 2025-04-16

## 2025-04-06 RX ORDER — ACETAMINOPHEN 500 MG
1000 TABLET ORAL
Status: COMPLETED | OUTPATIENT
Start: 2025-04-06 | End: 2025-04-06

## 2025-04-06 RX ORDER — LIDOCAINE 50 MG/G
1 PATCH TOPICAL DAILY
Qty: 10 PATCH | Refills: 0 | Status: SHIPPED | OUTPATIENT
Start: 2025-04-06 | End: 2025-04-16

## 2025-04-06 RX ORDER — LIDOCAINE 4 G/G
1 PATCH TOPICAL
Status: DISCONTINUED | OUTPATIENT
Start: 2025-04-06 | End: 2025-04-06 | Stop reason: HOSPADM

## 2025-04-06 RX ADMIN — ACETAMINOPHEN 1000 MG: 500 TABLET, FILM COATED ORAL at 01:09

## 2025-04-06 RX ADMIN — DIAZEPAM 5 MG: 5 TABLET ORAL at 01:09

## 2025-04-06 ASSESSMENT — PAIN SCALES - GENERAL: PAINLEVEL_OUTOF10: 10

## 2025-04-06 ASSESSMENT — PAIN DESCRIPTION - LOCATION: LOCATION: NECK

## 2025-04-06 NOTE — ED PROVIDER NOTES
[facility] EMERGENCY DEPARTMENT  EMERGENCY DEPARTMENT ENCOUNTER       Pt Name: Laura Dennis  MRN: [id number]  Birthdate 1960  Date of evaluation: 4/6/2025  Provider: Frank Hilario MD   PCP: Fabiola Kraus MD  Note Started: 4:14 PM 4/6/25     CHIEF COMPLAINT       Chief Complaint   Patient presents with    Neck Pain     Ambulatory to room c/o of back of head and neck pain since last night. Pt denies falling or injury to area        HISTORY OF PRESENT ILLNESS: 1 or more elements      History From: Patient  None     Laura Dennis is a 65 y.o. female who presents to the emergency department with neck pain which began last night and continued today.  Pain radiates up towards the occiput of her head but she otherwise denies headache.  She denies any recent trauma.  Patient states she did have similar symptoms 1 time before in the past which resolved spontaneously.  Denies any lower extremity numbness or weakness, gait changes.     Nursing Notes were all reviewed and agreed with or any disagreements were addressed in the HPI.     REVIEW OF SYSTEMS      Review of Systems     Positives and Pertinent negatives as per HPI.    PAST HISTORY     Past Medical History:  Past Medical History:   Diagnosis Date    ARF (acute renal failure) 3/12/11    as of 8/11/15 pt states has resolved and is not currently being folllowed by specialist    Breast cancer ([facility]) 07/2015    triple (-), bilateral mastectomy, chemotherapy completed,last treatment 1/2016    Chest pain     Per Pt on Rt side and arm pit only, resolves with Tylenol and states she believes it is related to her tumor    Colon polyp     Dizziness     pt states it is vertigo    Echocardiogram abnormal 2/19/16    Family hx of colon cancer     History of chemotherapy     AUG 2015- JAN 2016    Nephrotic syndrome 3/12/2011    biopsy inconclusive but steroid responsive    Rheumatoid arthritis ([facility]) 2016    Sjogren syndrome, unspecified 2016    TMJ (temporomandibular

## 2025-04-06 NOTE — DISCHARGE INSTRUCTIONS
Thank You!    It was a pleasure taking care of you in our Emergency Department today. We know that when you come to our Emergency Department, you are entrusting us with your health, comfort, and safety. Our physicians and nurses honor that trust, and truly appreciate the opportunity to care for you and your loved ones.      We also value your feedback. If you receive a survey about your Emergency Department experience today, please fill it out.  We care about our patients' feedback, and we listen to what you have to say.  Thank you.    Frank Hilario MD  ________________________________________________________________________  I have included a copy of your lab results and/or radiologic studies from today's visit so you can have them easily available at your follow-up visit. We hope you feel better and please do not hesitate to contact the ED if you have any questions at all!    No results found for this or any previous visit (from the past 12 hours).  No orders to display       The exam and treatment you received in the Emergency Department were for an urgent problem and are not intended as complete care. It is important that you follow up with a doctor, nurse practitioner, or physician assistant for ongoing care. If your symptoms become worse or you do not improve as expected and you are unable to reach your usual health care provider, you should return to the Emergency Department. We are available 24 hours a day.    Please take your discharge instructions with you when you go to your follow-up appointment.     If a prescription has been provided, please have it filled as soon as possible to prevent a delay in treatment. Read the entire medication instruction sheet provided to you by the pharmacy. If you have any questions or reservations about taking the medication due to side effects or interactions with other medications, please call your primary care physician or contact the ER to speak with the charge

## 2025-04-07 ENCOUNTER — HOSPITAL ENCOUNTER (OUTPATIENT)
Facility: HOSPITAL | Age: 65
Discharge: HOME | End: 2025-04-10
Payer: OTHER GOVERNMENT

## 2025-04-07 ENCOUNTER — TRANSCRIBE ORDERS (OUTPATIENT)
Facility: HOSPITAL | Age: 65
End: 2025-04-07

## 2025-04-07 DIAGNOSIS — M54.2 NECK PAIN: ICD-10-CM

## 2025-04-07 DIAGNOSIS — M54.2 NECK PAIN: Primary | ICD-10-CM

## 2025-04-07 PROCEDURE — 72050 X-RAY EXAM NECK SPINE 4/5VWS: CPT

## 2025-06-03 NOTE — PROGRESS NOTES
Laura Dennis is a 65 y.o. female who presents for annual exam    Sees Dr Felder, nephrology, August 2024.  Annual follow up.  for CKD/membranous GN. On diovan 40mg for renal protection.       Followed by Dr Zara Ontiveros, rheumatology for Sjogrens and RA,no DMARDS.   Using voltaren gel for hands prn.       On prolia every 6 months for osteoporosis.      Saw Dr Kingston, [facility],  had stress testing.  Was told cholesterol elevated. Statin recommended. Patient wanted  to try diet first.  Changed diet, reduced egg  yolks and cheese.  Not eating out.       Prior breast cancer.  s/p bilateral mastectomy. Prior chemo in 2016. Released from oncology. Sees surgery once a year.       Normal BM.  Up to date on colonoscopy August 2022, recheck 2027. Prior GERD, off medication. Dr Chang.       Up to date on eye and dental.     Right lung nodule 3mm. Repeat CT chest no growth in 2021.  Seen by pulmonary at at that time.           Past Medical History:   Diagnosis Date    ARF (acute renal failure) 3/12/11    as of 8/11/15 pt states has resolved and is not currently being folllowed by specialist    Breast cancer ([facility]) 07/2015    triple (-), bilateral mastectomy, chemotherapy completed,last treatment 1/2016    Chest pain     Per Pt on Rt side and arm pit only, resolves with Tylenol and states she believes it is related to her tumor    Colon polyp     Dizziness     pt states it is vertigo    Echocardiogram abnormal 2/19/16    Family hx of colon cancer     History of chemotherapy     AUG 2015- JAN 2016    Nephrotic syndrome 3/12/2011    biopsy inconclusive but steroid responsive    Rheumatoid arthritis ([facility]) 2016    Sjogren syndrome, unspecified 2016    TMJ (temporomandibular joint disorder)     Toothache     being treated for tooth ache x 1 week. pain better with treatment    Vertigo        Family History   Problem Relation Age of Onset    Kidney Disease Mother         dialysis    Hypertension Mother     Cancer

## 2025-06-06 ENCOUNTER — OFFICE VISIT (OUTPATIENT)
Age: 65
End: 2025-06-06
Payer: COMMERCIAL

## 2025-06-06 VITALS
OXYGEN SATURATION: 97 % | HEART RATE: 64 BPM | BODY MASS INDEX: 25.76 KG/M2 | HEIGHT: 68 IN | SYSTOLIC BLOOD PRESSURE: 134 MMHG | DIASTOLIC BLOOD PRESSURE: 86 MMHG | WEIGHT: 170 LBS | TEMPERATURE: 97.6 F | RESPIRATION RATE: 20 BRPM

## 2025-06-06 DIAGNOSIS — R91.1 SOLITARY LUNG NODULE: ICD-10-CM

## 2025-06-06 DIAGNOSIS — Z00.00 ROUTINE MEDICAL EXAM: Primary | ICD-10-CM

## 2025-06-06 DIAGNOSIS — M35.00 SJOGREN'S SYNDROME, WITH UNSPECIFIED ORGAN INVOLVEMENT: ICD-10-CM

## 2025-06-06 DIAGNOSIS — N05.2 MEMBRANOUS GLOMERULONEPHRITIS: ICD-10-CM

## 2025-06-06 DIAGNOSIS — E55.9 VITAMIN D DEFICIENCY: ICD-10-CM

## 2025-06-06 DIAGNOSIS — M06.9 RHEUMATOID ARTHRITIS, INVOLVING UNSPECIFIED SITE, UNSPECIFIED WHETHER RHEUMATOID FACTOR PRESENT (HCC): ICD-10-CM

## 2025-06-06 PROCEDURE — 99396 PREV VISIT EST AGE 40-64: CPT | Performed by: FAMILY MEDICINE

## 2025-06-06 ASSESSMENT — PATIENT HEALTH QUESTIONNAIRE - PHQ9
SUM OF ALL RESPONSES TO PHQ QUESTIONS 1-9: 0
1. LITTLE INTEREST OR PLEASURE IN DOING THINGS: NOT AT ALL
SUM OF ALL RESPONSES TO PHQ QUESTIONS 1-9: 0
2. FEELING DOWN, DEPRESSED OR HOPELESS: NOT AT ALL

## 2025-06-06 NOTE — PROGRESS NOTES
Have you been to the ER, urgent care clinic since your last visit?  Hospitalized since your last visit?   No  Have you seen or consulted any other health care providers outside our system since your last visit?   No.     “Have you had a pap smear?”    Scheduled for July 2025.    No cervical cancer screening on file

## 2025-06-12 ENCOUNTER — HOSPITAL ENCOUNTER (OUTPATIENT)
Facility: HOSPITAL | Age: 65
Discharge: HOME | End: 2025-06-15
Payer: COMMERCIAL

## 2025-06-12 DIAGNOSIS — E55.9 VITAMIN D DEFICIENCY: ICD-10-CM

## 2025-06-12 DIAGNOSIS — R91.1 SOLITARY LUNG NODULE: ICD-10-CM

## 2025-06-12 DIAGNOSIS — Z00.00 ROUTINE MEDICAL EXAM: ICD-10-CM

## 2025-06-12 PROCEDURE — 71046 X-RAY EXAM CHEST 2 VIEWS: CPT

## 2025-06-14 LAB
25(OH)D3 SERPL-MCNC: 35.8 NG/ML (ref 30–100)
ALBUMIN SERPL-MCNC: 4 G/DL (ref 3.5–5)
ALBUMIN/GLOB SERPL: 1.1 (ref 1.1–2.2)
ALP SERPL-CCNC: 43 U/L (ref 45–117)
ALT SERPL-CCNC: 33 U/L (ref 12–78)
ANION GAP SERPL CALC-SCNC: 7 MMOL/L (ref 2–12)
AST SERPL-CCNC: 32 U/L (ref 15–37)
BASOPHILS # BLD: 0.01 K/UL (ref 0–0.1)
BASOPHILS NFR BLD: 0.2 % (ref 0–1)
BILIRUB SERPL-MCNC: 0.7 MG/DL (ref 0.2–1)
BUN SERPL-MCNC: 11 MG/DL (ref 6–20)
BUN/CREAT SERPL: 17 (ref 12–20)
CALCIUM SERPL-MCNC: 9.4 MG/DL (ref 8.5–10.1)
CHLORIDE SERPL-SCNC: 110 MMOL/L (ref 97–108)
CHOLEST SERPL-MCNC: 190 MG/DL
CO2 SERPL-SCNC: 21 MMOL/L (ref 21–32)
CREAT SERPL-MCNC: 0.63 MG/DL (ref 0.55–1.02)
DIFFERENTIAL METHOD BLD: ABNORMAL
EOSINOPHIL # BLD: 0.12 K/UL (ref 0–0.4)
EOSINOPHIL NFR BLD: 3 % (ref 0–7)
ERYTHROCYTE [DISTWIDTH] IN BLOOD BY AUTOMATED COUNT: 13 % (ref 11.5–14.5)
EST. AVERAGE GLUCOSE BLD GHB EST-MCNC: 114 MG/DL
GLOBULIN SER CALC-MCNC: 3.6 G/DL (ref 2–4)
GLUCOSE SERPL-MCNC: 95 MG/DL (ref 65–100)
HBA1C MFR BLD: 5.6 % (ref 4–5.6)
HCT VFR BLD AUTO: 36.8 % (ref 35–47)
HDLC SERPL-MCNC: 67 MG/DL
HDLC SERPL: 2.8 (ref 0–5)
HGB BLD-MCNC: 12 G/DL (ref 11.5–16)
IMM GRANULOCYTES # BLD AUTO: 0.01 K/UL (ref 0–0.04)
IMM GRANULOCYTES NFR BLD AUTO: 0.2 % (ref 0–0.5)
LDLC SERPL CALC-MCNC: 109.2 MG/DL (ref 0–100)
LYMPHOCYTES # BLD: 1.99 K/UL (ref 0.8–3.5)
LYMPHOCYTES NFR BLD: 49.6 % (ref 12–49)
MCH RBC QN AUTO: 31.1 PG (ref 26–34)
MCHC RBC AUTO-ENTMCNC: 32.6 G/DL (ref 30–36.5)
MCV RBC AUTO: 95.3 FL (ref 80–99)
MONOCYTES # BLD: 0.33 K/UL (ref 0–1)
MONOCYTES NFR BLD: 8.2 % (ref 5–13)
NEUTS SEG # BLD: 1.55 K/UL (ref 1.8–8)
NEUTS SEG NFR BLD: 38.8 % (ref 32–75)
NRBC # BLD: 0 K/UL (ref 0–0.01)
NRBC BLD-RTO: 0 PER 100 WBC
PLATELET # BLD AUTO: 208 K/UL (ref 150–400)
PMV BLD AUTO: 9.7 FL (ref 8.9–12.9)
POTASSIUM SERPL-SCNC: 4.3 MMOL/L (ref 3.5–5.1)
PROT SERPL-MCNC: 7.6 G/DL (ref 6.4–8.2)
RBC # BLD AUTO: 3.86 M/UL (ref 3.8–5.2)
SODIUM SERPL-SCNC: 138 MMOL/L (ref 136–145)
TRIGL SERPL-MCNC: 69 MG/DL
TSH SERPL DL<=0.05 MIU/L-ACNC: 1.2 UIU/ML (ref 0.36–3.74)
VLDLC SERPL CALC-MCNC: 13.8 MG/DL
WBC # BLD AUTO: 4 K/UL (ref 3.6–11)

## (undated) DEVICE — PLASTICS CHEST BREAST ASU: Brand: MEDLINE INDUSTRIES, INC.

## (undated) DEVICE — Device

## (undated) DEVICE — CATH IV AUTOGRD BC PNK 20GA 25 -- INSYTE

## (undated) DEVICE — SET ADMIN 16ML TBNG L100IN 2 Y INJ SITE IV PIGGY BK DISP

## (undated) DEVICE — GLOVE SURG SZ 6 L12IN FNGR THK79MIL GRN LTX FREE

## (undated) DEVICE — GARMENT,MEDLINE,DVT,INT,CALF,MED, GEN2: Brand: MEDLINE

## (undated) DEVICE — SUTURE MCRYL SZ 4-0 L27IN ABSRB UD L19MM PS-2 1/2 CIR PRIM Y426H

## (undated) DEVICE — 1200 GUARD II KIT W/5MM TUBE W/O VAC TUBE: Brand: GUARDIAN

## (undated) DEVICE — BASIN EMSIS 16OZ GRAPHITE PLAS KID SHP MOLD GRAD FOR ORAL

## (undated) DEVICE — SYR 3ML LL TIP 1/10ML GRAD --

## (undated) DEVICE — NEONATAL-ADULT SPO2 SENSOR: Brand: NELLCOR

## (undated) DEVICE — SYR 10ML LUER LOK 1/5ML GRAD --

## (undated) DEVICE — ELECTRODE,RADIOTRANSLUCENT,FOAM,5PK: Brand: MEDLINE

## (undated) DEVICE — PROVE COVER: Brand: UNBRANDED

## (undated) DEVICE — TOWEL 4 PLY TISS 19X30 SUE WHT

## (undated) DEVICE — SUTURE VCRL SZ 2-0 L27IN ABSRB UD L26MM SH 1/2 CIR J417H

## (undated) DEVICE — PENCIL SMK EVAC L10FT DIA95MM TBNG NONSTICK W ADPT TO 22MM

## (undated) DEVICE — 3M™ TEGADERM™ I.V. SECUREMENT DRESSING, 9519HP, 2-3/8 IN X 2-3/8 IN (6 CM X 6 CM), 100/CT 4 CT/CASE: Brand: 3M™ TEGADERM™

## (undated) DEVICE — LIQUIBAND RAPID ADHESIVE 36/CS 0.8ML: Brand: MEDLINE

## (undated) DEVICE — SOLUTION IRRIG 1000ML 0.9% SOD CHL USP POUR PLAS BTL

## (undated) DEVICE — SOLIDIFIER FLD 2OZ 1500CC N DISINF IN BTL DISP SAFESORB

## (undated) DEVICE — BLADE ES ELASTOMERIC COAT INSUL DURABLE BEND UPTO 90DEG

## (undated) DEVICE — Z DISCONTINUED PER MEDLINE LINE GAS SAMPLING O2/CO2 LNG AD 13 FT NSL W/ TBNG FILTERLINE

## (undated) DEVICE — HYPODERMIC SAFETY NEEDLE: Brand: MAGELLAN